# Patient Record
Sex: FEMALE | Race: WHITE | NOT HISPANIC OR LATINO | Employment: OTHER | URBAN - METROPOLITAN AREA
[De-identification: names, ages, dates, MRNs, and addresses within clinical notes are randomized per-mention and may not be internally consistent; named-entity substitution may affect disease eponyms.]

---

## 2017-01-03 DIAGNOSIS — J30.9 ALLERGIC RHINITIS: ICD-10-CM

## 2017-01-04 RX ORDER — MONTELUKAST SODIUM 10 MG/1
TABLET ORAL
Qty: 90 TABLET | Refills: 1 | Status: SHIPPED | OUTPATIENT
Start: 2017-01-04 | End: 2017-07-05 | Stop reason: SDUPTHER

## 2017-02-13 DIAGNOSIS — E78.5 HYPERLIPIDEMIA: Chronic | ICD-10-CM

## 2017-02-13 RX ORDER — PRAVASTATIN SODIUM 80 MG/1
TABLET ORAL
Qty: 90 TABLET | Refills: 0 | Status: SHIPPED | OUTPATIENT
Start: 2017-02-13 | End: 2017-05-16 | Stop reason: SDUPTHER

## 2017-03-09 DIAGNOSIS — C50.911 MALIGNANT NEOPLASM OF RIGHT FEMALE BREAST, UNSPECIFIED SITE OF BREAST: ICD-10-CM

## 2017-03-09 DIAGNOSIS — R32 URINARY INCONTINENCE, UNSPECIFIED TYPE: ICD-10-CM

## 2017-03-09 DIAGNOSIS — I10 ESSENTIAL HYPERTENSION: Chronic | ICD-10-CM

## 2017-03-09 DIAGNOSIS — R32 URINARY INCONTINENCE, UNSPECIFIED TYPE: Primary | ICD-10-CM

## 2017-03-09 RX ORDER — ANASTROZOLE 1 MG/1
TABLET ORAL
Qty: 90 TABLET | Refills: 0 | Status: SHIPPED | OUTPATIENT
Start: 2017-03-09 | End: 2017-03-15 | Stop reason: SDUPTHER

## 2017-03-09 RX ORDER — OXYBUTYNIN CHLORIDE 10 MG/1
10 TABLET, EXTENDED RELEASE ORAL DAILY
Qty: 30 TABLET | Refills: 0 | Status: SHIPPED | OUTPATIENT
Start: 2017-03-09 | End: 2017-03-09 | Stop reason: SDUPTHER

## 2017-03-09 RX ORDER — OXYBUTYNIN CHLORIDE 10 MG/1
TABLET, EXTENDED RELEASE ORAL
Qty: 90 TABLET | Refills: 0 | Status: SHIPPED | OUTPATIENT
Start: 2017-03-09 | End: 2017-03-15 | Stop reason: SDUPTHER

## 2017-03-09 RX ORDER — AMLODIPINE BESYLATE 10 MG/1
10 TABLET ORAL DAILY
Qty: 30 TABLET | Refills: 0 | Status: SHIPPED | OUTPATIENT
Start: 2017-03-09 | End: 2017-03-09 | Stop reason: SDUPTHER

## 2017-03-09 RX ORDER — ANASTROZOLE 1 MG/1
TABLET ORAL
Qty: 30 TABLET | Refills: 0 | Status: SHIPPED | OUTPATIENT
Start: 2017-03-09 | End: 2017-03-09 | Stop reason: SDUPTHER

## 2017-03-09 RX ORDER — AMLODIPINE BESYLATE 10 MG/1
TABLET ORAL
Qty: 90 TABLET | Refills: 0 | Status: SHIPPED | OUTPATIENT
Start: 2017-03-09 | End: 2017-03-15 | Stop reason: SDUPTHER

## 2017-03-09 NOTE — TELEPHONE ENCOUNTER
----- Message from Rose Marie Saunders sent at 3/9/2017 10:43 AM CST -----  Contact: self  Pt needs to speak with a nurse in regards to medication refill before visit. Please call [phone]. Thanks

## 2017-03-15 ENCOUNTER — LAB VISIT (OUTPATIENT)
Dept: LAB | Facility: HOSPITAL | Age: 77
End: 2017-03-15
Attending: FAMILY MEDICINE
Payer: MEDICARE

## 2017-03-15 ENCOUNTER — OFFICE VISIT (OUTPATIENT)
Dept: FAMILY MEDICINE | Facility: CLINIC | Age: 77
End: 2017-03-15
Payer: MEDICARE

## 2017-03-15 VITALS
DIASTOLIC BLOOD PRESSURE: 66 MMHG | WEIGHT: 176.38 LBS | BODY MASS INDEX: 30.11 KG/M2 | HEIGHT: 64 IN | OXYGEN SATURATION: 96 % | HEART RATE: 97 BPM | SYSTOLIC BLOOD PRESSURE: 122 MMHG | TEMPERATURE: 98 F

## 2017-03-15 DIAGNOSIS — R32 URINARY INCONTINENCE, UNSPECIFIED TYPE: ICD-10-CM

## 2017-03-15 DIAGNOSIS — M25.50 ARTHRALGIA OF MULTIPLE JOINTS: ICD-10-CM

## 2017-03-15 DIAGNOSIS — I10 ESSENTIAL HYPERTENSION: Chronic | ICD-10-CM

## 2017-03-15 DIAGNOSIS — C50.511 MALIGNANT NEOPLASM OF LOWER-OUTER QUADRANT OF RIGHT FEMALE BREAST: Chronic | ICD-10-CM

## 2017-03-15 DIAGNOSIS — J44.9 CHRONIC OBSTRUCTIVE PULMONARY DISEASE, UNSPECIFIED COPD TYPE: ICD-10-CM

## 2017-03-15 DIAGNOSIS — Z00.00 ANNUAL PHYSICAL EXAM: Primary | ICD-10-CM

## 2017-03-15 DIAGNOSIS — C50.911 MALIGNANT NEOPLASM OF RIGHT FEMALE BREAST, UNSPECIFIED SITE OF BREAST: ICD-10-CM

## 2017-03-15 DIAGNOSIS — E03.9 HYPOTHYROIDISM, UNSPECIFIED TYPE: Chronic | ICD-10-CM

## 2017-03-15 DIAGNOSIS — N18.30 CKD (CHRONIC KIDNEY DISEASE) STAGE 3, GFR 30-59 ML/MIN: Chronic | ICD-10-CM

## 2017-03-15 DIAGNOSIS — N25.81 SECONDARY HYPERPARATHYROIDISM: Chronic | ICD-10-CM

## 2017-03-15 DIAGNOSIS — Z23 NEED FOR PROPHYLACTIC VACCINATION WITH STREPTOCOCCUS PNEUMONIAE (PNEUMOCOCCUS) AND INFLUENZA VACCINES: ICD-10-CM

## 2017-03-15 LAB
ALBUMIN SERPL BCP-MCNC: 4.1 G/DL
ALP SERPL-CCNC: 90 U/L
ALT SERPL W/O P-5'-P-CCNC: 10 U/L
ANION GAP SERPL CALC-SCNC: 16 MMOL/L
AST SERPL-CCNC: 19 U/L
BASOPHILS # BLD AUTO: 0.05 K/UL
BASOPHILS NFR BLD: 0.5 %
BILIRUB SERPL-MCNC: 0.4 MG/DL
BUN SERPL-MCNC: 25 MG/DL
CALCIUM SERPL-MCNC: 10.7 MG/DL
CHLORIDE SERPL-SCNC: 103 MMOL/L
CHOLEST/HDLC SERPL: 4.5 {RATIO}
CO2 SERPL-SCNC: 21 MMOL/L
CREAT SERPL-MCNC: 1.2 MG/DL
DIFFERENTIAL METHOD: ABNORMAL
EOSINOPHIL # BLD AUTO: 0.5 K/UL
EOSINOPHIL NFR BLD: 4.5 %
ERYTHROCYTE [DISTWIDTH] IN BLOOD BY AUTOMATED COUNT: 13.2 %
EST. GFR  (AFRICAN AMERICAN): 51.1 ML/MIN/1.73 M^2
EST. GFR  (NON AFRICAN AMERICAN): 44.3 ML/MIN/1.73 M^2
GLUCOSE SERPL-MCNC: 119 MG/DL
HCT VFR BLD AUTO: 40.9 %
HDL/CHOLESTEROL RATIO: 22.2 %
HDLC SERPL-MCNC: 203 MG/DL
HDLC SERPL-MCNC: 45 MG/DL
HGB BLD-MCNC: 13.3 G/DL
LDLC SERPL CALC-MCNC: 123.8 MG/DL
LYMPHOCYTES # BLD AUTO: 2.5 K/UL
LYMPHOCYTES NFR BLD: 22.8 %
MCH RBC QN AUTO: 30.6 PG
MCHC RBC AUTO-ENTMCNC: 32.5 %
MCV RBC AUTO: 94 FL
MONOCYTES # BLD AUTO: 0.8 K/UL
MONOCYTES NFR BLD: 7.5 %
NEUTROPHILS # BLD AUTO: 7.1 K/UL
NEUTROPHILS NFR BLD: 64.3 %
NONHDLC SERPL-MCNC: 158 MG/DL
PLATELET # BLD AUTO: 472 K/UL
PMV BLD AUTO: 9.8 FL
POTASSIUM SERPL-SCNC: 4.4 MMOL/L
PROT SERPL-MCNC: 7.8 G/DL
RBC # BLD AUTO: 4.34 M/UL
SODIUM SERPL-SCNC: 140 MMOL/L
TRIGL SERPL-MCNC: 171 MG/DL
TSH SERPL DL<=0.005 MIU/L-ACNC: 2.73 UIU/ML
WBC # BLD AUTO: 11 K/UL

## 2017-03-15 PROCEDURE — 85025 COMPLETE CBC W/AUTO DIFF WBC: CPT

## 2017-03-15 PROCEDURE — 84443 ASSAY THYROID STIM HORMONE: CPT

## 2017-03-15 PROCEDURE — 90662 IIV NO PRSV INCREASED AG IM: CPT | Mod: S$GLB,,, | Performed by: FAMILY MEDICINE

## 2017-03-15 PROCEDURE — 99999 PR PBB SHADOW E&M-EST. PATIENT-LVL III: CPT | Mod: PBBFAC,,, | Performed by: FAMILY MEDICINE

## 2017-03-15 PROCEDURE — 3078F DIAST BP <80 MM HG: CPT | Mod: S$GLB,,, | Performed by: FAMILY MEDICINE

## 2017-03-15 PROCEDURE — G0009 ADMIN PNEUMOCOCCAL VACCINE: HCPCS | Mod: S$GLB,,, | Performed by: FAMILY MEDICINE

## 2017-03-15 PROCEDURE — G0008 ADMIN INFLUENZA VIRUS VAC: HCPCS | Mod: S$GLB,,, | Performed by: FAMILY MEDICINE

## 2017-03-15 PROCEDURE — 36415 COLL VENOUS BLD VENIPUNCTURE: CPT | Mod: PO

## 2017-03-15 PROCEDURE — 3074F SYST BP LT 130 MM HG: CPT | Mod: S$GLB,,, | Performed by: FAMILY MEDICINE

## 2017-03-15 PROCEDURE — 90732 PPSV23 VACC 2 YRS+ SUBQ/IM: CPT | Mod: S$GLB,,, | Performed by: FAMILY MEDICINE

## 2017-03-15 PROCEDURE — 80061 LIPID PANEL: CPT

## 2017-03-15 PROCEDURE — 80053 COMPREHEN METABOLIC PANEL: CPT

## 2017-03-15 PROCEDURE — 99397 PER PM REEVAL EST PAT 65+ YR: CPT | Mod: 25,S$GLB,, | Performed by: FAMILY MEDICINE

## 2017-03-15 RX ORDER — AMLODIPINE BESYLATE 10 MG/1
TABLET ORAL
Qty: 90 TABLET | Refills: 1 | Status: SHIPPED | OUTPATIENT
Start: 2017-03-15 | End: 2017-10-04 | Stop reason: SDUPTHER

## 2017-03-15 RX ORDER — ANASTROZOLE 1 MG/1
TABLET ORAL
Qty: 90 TABLET | Refills: 1 | Status: SHIPPED | OUTPATIENT
Start: 2017-03-15 | End: 2017-10-04 | Stop reason: SDUPTHER

## 2017-03-15 RX ORDER — OXYBUTYNIN CHLORIDE 10 MG/1
TABLET, EXTENDED RELEASE ORAL
Qty: 90 TABLET | Refills: 1 | Status: SHIPPED | OUTPATIENT
Start: 2017-03-15 | End: 2017-10-04 | Stop reason: SDUPTHER

## 2017-03-15 NOTE — PATIENT INSTRUCTIONS
Eating Heart-Healthy Foods  Eating has a big impact on your heart health. In fact, eating healthier can improve several of your heart risks at once. For instance, it helps you manage weight, cholesterol, and blood pressure. Here are ideas to help you make heart-healthy changes without giving up all the foods and flavors you love.  Getting started  · Talk with your health care provider about eating plans, such as the DASH or Mediterranean diet. You may also be referred to a dietitian.  · Change a few things at a time. Give yourself time to get used to a few eating changes before adding more.  · Work to create a tasty, healthy eating plan that you can stick to for the rest of your life.    Goals for healthy eating  Below are some tips to improve your eating habits:  · Limit saturated fats and trans fats. Saturated fats raise your levels of cholesterol, so keep these fats to a minimum. They are found in foods such as fatty meats, whole milk, cheese, and palm and coconut oils. Avoid trans fats because they lower good cholesterol as well as raise bad cholesterol. Trans fats are most often found in processed foods.  · Reduce sodium (salt) intake. Eating too much salt may increase your blood pressure. Limit your sodium intake to 2,300 milligrams (mg) per day, or less if your health care provider recommends it. Dining out less often and eating fewer processed foods are two great ways to decrease the amount of salt you consume.  · Managing calories. A calorie is a unit of energy. Your body burns calories for fuel, but if you eat more calories than your body burns, the extras are stored as fat. Your health care provider can help you create a diet plan to manage your calories. This will likely include eating healthier foods as well as exercising regularly. To help you track your progress, keep a diary to record what you eat and how often you exercise.  Choose the right foods  Aim to make these foods staples of your diet. If  you have diabetes, you may have different recommendations than what is listed here:  · Fruits and vegetable provide plenty of nutrients without a lot of calories. At meals, fill half your plate with these foods. Split the other half of your plate between whole grains and lean protein.  · Whole grains are high in fiber and rich in vitamins and nutrients. Good choices include whole-wheat bread, pasta, and brown rice.  · Lean proteins give you nutrition with less fat. Good choices include fish, skinless chicken, and beans.  · Low-fat or nonfat dairy provides nutrients without a lot of fat. Try low-fat or nonfat milk, cheese, or yogurt.  · Healthy fats can be good for you in small amounts. These are unsaturated fats, such as olive oil, nuts, and fish. Try to have at least 2 servings per week of fatty fish such as salmon, sardines, mackerel, rainbow trout, and albacore tuna. These contain omega-3 fatty acids, which are good for your heart. Flaxseed is another source of a heart-healthy fat.  More on heart healthy eating    Read food labels  Healthy eating starts at the grocery store. Be sure to pay attention to food labels on packaged foods. Look for products that are high in fiber and protein, and low in saturated fat, cholesterol, and sodium. Avoid products that contain trans fat. And pay close attention to serving size. For instance, if you plan to eat two servings, double all the numbers on the label.  Prepare food right  A key part of healthy cooking is cutting down on added fat and salt. Look on the internet for lower-fat, lower-sodium recipes. Also, try these tips:  · Remove fat from meat and skin from poultry before cooking.  · Skim fat from the surface of soups and sauces.  · Broil, boil, bake, steam, grill, and microwave food without added fats.  · Choose ingredients that spice up your food without adding calories, fat, or sodium. Try these items: horseradish, hot sauce, lemon, mustard, nonfat salad dressings,  and vinegar. For salt-free herbs and spices, try basil, cilantro, cinnamon, pepper, and rosemary.  Date Last Reviewed: 6/25/2015  © 4278-0240 [employer]. [address] [place] [address], [place] [address]. All rights reserved. This information is not intended as a substitute for professional medical care. Always follow your healthcare professional's instructions.

## 2017-03-15 NOTE — MR AVS SNAPSHOT
[facility]  [address]  [place] [address]  Phone: [phone]  Fax: [phone]                  Ivelisse Kern   3/15/2017 2:00 PM   Office Visit    Description:  Female : 1940   Provider:  Mile Young MD   Department:  [facility] Medicine           Reason for Visit     Establish Care     Foot Pain           Diagnoses this Visit        Comments    Malignant neoplasm of lower-outer quadrant of right female breast    -  Primary     Urinary incontinence, unspecified type         Essential hypertension         Malignant neoplasm of right female breast, unspecified site of breast         Need for prophylactic vaccination with Streptococcus pneumoniae (Pneumococcus) and Influenza vaccines                To Do List           Future Appointments        Provider Department Dept Phone    3/15/2017 3:00 PM LAB, [place] [facility] [phone]      Goals (5 Years of Data)              Today    16    Blood Pressure < 140/90   122/66  122/66  122/66       These Medications        Disp Refills Start End    oxybutynin (DITROPAN-XL) 10 MG 24 hr tablet 90 tablet 1 3/15/2017     TAKE 1 TABLET(10 MG) BY MOUTH EVERY DAY    Pharmacy: [facility] [address] AT [facility] Ph #: [phone]       Notes to Pharmacy: **Patient requests 90 days supply**    amlodipine (NORVASC) 10 MG tablet 90 tablet 1 3/15/2017     TAKE 1 TABLET(10 MG) BY MOUTH EVERY DAY    Pharmacy: [facility] [address] AT [facility] Ph #: [phone]       Notes to Pharmacy: **Patient requests 90 days supply**    anastrozole (ARIMIDEX) 1 mg Tab 90 tablet 1 3/15/2017     TAKE 1 TABLET(1 MG) BY MOUTH EVERY DAY    Pharmacy: [facility] [address] AT [facility] Ph #: [phone]       Notes to Pharmacy: **Patient requests  90 days supply**      [facility] On Call     [facility] On Call Nurse Care Line - 24/7 Assistance  Registered nurses in the [facility] provide clinical advisement, health education, appointment booking, and other advisory services.  Call for this free service at [phone].             Medications           Message regarding Medications     Verify the changes and/or additions to your medication regime listed below are the same as discussed with your clinician today.  If any of these changes or additions are incorrect, please notify your healthcare provider.        STOP taking these medications     denosumab (PROLIA) 60 mg/mL Syrg Inject 1 mL (60 mg total) into the skin every 6 (six) months.           Verify that the below list of medications is an accurate representation of the medications you are currently taking.  If none reported, the list may be blank. If incorrect, please contact your healthcare provider. Carry this list with you in case of emergency.           Current Medications     albuterol (PROAIR HFA) 90 mcg/actuation inhaler Inhale 1 puff into the lungs every 6 (six) hours as needed for Wheezing.    allopurinol (ZYLOPRIM) 100 MG tablet TAKE 1 TABLET(100 MG) BY MOUTH EVERY DAY    amlodipine (NORVASC) 10 MG tablet TAKE 1 TABLET(10 MG) BY MOUTH EVERY DAY    anastrozole (ARIMIDEX) 1 mg Tab TAKE 1 TABLET(1 MG) BY MOUTH EVERY DAY    calcium carbonate (OS-AMAURY) 500 mg calcium (1,250 mg) tablet Take 2 tablets (1,000 mg total) by mouth once daily.    cholecalciferol, vitamin D3, 400 unit Cap Take 2 capsules (800 Units total) by mouth once daily.    diclofenac sodium (VOLTAREN) 1 % Gel Apply 2 g topically daily as needed.    fluticasone (FLONASE) 50 mcg/actuation nasal spray 1 spray by Each Nare route once daily.    levothyroxine (SYNTHROID) 25 MCG tablet TAKE 1 TABLET(25 MCG) BY MOUTH EVERY MORNING    metoprolol succinate (TOPROL-XL) 50 MG 24 hr tablet TAKE 1 TABLET(50 MG) BY MOUTH EVERY EVENING     "montelukast (SINGULAIR) 10 mg tablet TAKE 1 TABLET BY MOUTH EVERY EVENING    omeprazole (PRILOSEC) 20 MG capsule Take 1 capsule (20 mg total) by mouth once daily.    oxybutynin (DITROPAN-XL) 10 MG 24 hr tablet TAKE 1 TABLET(10 MG) BY MOUTH EVERY DAY    pravastatin (PRAVACHOL) 80 MG tablet TAKE 1 TABLET BY MOUTH EVERY EVENING    tramadol (ULTRAM) 50 mg tablet TAKE 1 TABLET BY MOUTH EVERY 8 HOURS AS NEEDED           Clinical Reference Information           Your Vitals Were     BP Pulse Temp Height Weight SpO2    122/66 (BP Location: Left arm, Patient Position: Sitting, BP Method: Manual) 97 98 °F (36.7 °C) (Oral) 5' 4" (1.626 m) 80 kg (176 lb 5.9 oz) 96%    BMI                30.27 kg/m2          Blood Pressure          Most Recent Value    BP  122/66      Allergies as of 3/15/2017     Codeine      Immunizations Administered on Date of Encounter - 3/15/2017     Name Date Dose VIS Date Route    Influenza - High Dose  Incomplete 0.5 mL 8/7/2015 Intramuscular    Pneumococcal Polysaccharide - 23 Valent  Incomplete 0.5 mL 4/24/2015 Intramuscular      Orders Placed During Today's Visit      Normal Orders This Visit    Influenza - High Dose (65+) (PF) (IM)     Pneumococcal Polysaccharide Vaccine (23 Valent) (SQ/IM)     Future Labs/Procedures Expected by Expires    CBC auto differential  3/15/2017 3/15/2018    Comprehensive metabolic panel  3/15/2017 3/15/2018    Lipid panel  3/15/2017 3/15/2018    Mammo Digital Diagnostic Bilat with Tomosynthesis_CAD  3/15/2017 5/15/2018    TSH  3/15/2017 3/15/2018      [facility] Sign-Up     Activating your [facility] account is as easy as 1-2-3!     1) Visit [email or web address], select Sign Up Now, enter this activation code and your date of birth, then select Next.  Activation code not generated  Current Patient Portal Status: Account disabled      2) Create a username and password to use when you visit [facility] in the future and select a security question in case you lose your password and " select Next.    3) Enter your e-mail address and click Sign Up!    Additional Information  If you have questions, please e-mail [email or web address] or call [phone] to talk to our [facility] staff. Remember, [facility] is NOT to be used for urgent needs. For medical emergencies, dial 911.         Instructions      Eating Heart-Healthy Foods  Eating has a big impact on your heart health. In fact, eating healthier can improve several of your heart risks at once. For instance, it helps you manage weight, cholesterol, and blood pressure. Here are ideas to help you make heart-healthy changes without giving up all the foods and flavors you love.  Getting started  · Talk with your health care provider about eating plans, such as the DASH or Mediterranean diet. You may also be referred to a dietitian.  · Change a few things at a time. Give yourself time to get used to a few eating changes before adding more.  · Work to create a tasty, healthy eating plan that you can stick to for the rest of your life.    Goals for healthy eating  Below are some tips to improve your eating habits:  · Limit saturated fats and trans fats. Saturated fats raise your levels of cholesterol, so keep these fats to a minimum. They are found in foods such as fatty meats, whole milk, cheese, and palm and coconut oils. Avoid trans fats because they lower good cholesterol as well as raise bad cholesterol. Trans fats are most often found in processed foods.  · Reduce sodium (salt) intake. Eating too much salt may increase your blood pressure. Limit your sodium intake to 2,300 milligrams (mg) per day, or less if your health care provider recommends it. Dining out less often and eating fewer processed foods are two great ways to decrease the amount of salt you consume.  · Managing calories. A calorie is a unit of energy. Your body burns calories for fuel, but if you eat more calories than your body burns, the extras are stored as fat. Your health care  provider can help you create a diet plan to manage your calories. This will likely include eating healthier foods as well as exercising regularly. To help you track your progress, keep a diary to record what you eat and how often you exercise.  Choose the right foods  Aim to make these foods staples of your diet. If you have diabetes, you may have different recommendations than what is listed here:  · Fruits and vegetable provide plenty of nutrients without a lot of calories. At meals, fill half your plate with these foods. Split the other half of your plate between whole grains and lean protein.  · Whole grains are high in fiber and rich in vitamins and nutrients. Good choices include whole-wheat bread, pasta, and brown rice.  · Lean proteins give you nutrition with less fat. Good choices include fish, skinless chicken, and beans.  · Low-fat or nonfat dairy provides nutrients without a lot of fat. Try low-fat or nonfat milk, cheese, or yogurt.  · Healthy fats can be good for you in small amounts. These are unsaturated fats, such as olive oil, nuts, and fish. Try to have at least 2 servings per week of fatty fish such as salmon, sardines, mackerel, rainbow trout, and albacore tuna. These contain omega-3 fatty acids, which are good for your heart. Flaxseed is another source of a heart-healthy fat.  More on heart healthy eating    Read food labels  Healthy eating starts at the grocery store. Be sure to pay attention to food labels on packaged foods. Look for products that are high in fiber and protein, and low in saturated fat, cholesterol, and sodium. Avoid products that contain trans fat. And pay close attention to serving size. For instance, if you plan to eat two servings, double all the numbers on the label.  Prepare food right  A key part of healthy cooking is cutting down on added fat and salt. Look on the internet for lower-fat, lower-sodium recipes. Also, try these tips:  · Remove fat from meat and skin from  poultry before cooking.  · Skim fat from the surface of soups and sauces.  · Broil, boil, bake, steam, grill, and microwave food without added fats.  · Choose ingredients that spice up your food without adding calories, fat, or sodium. Try these items: horseradish, hot sauce, lemon, mustard, nonfat salad dressings, and vinegar. For salt-free herbs and spices, try basil, cilantro, cinnamon, pepper, and rosemary.  Date Last Reviewed: 6/25/2015  © 3844-0941 [employer]. [address] [place] [address]. All rights reserved. This information is not intended as a substitute for professional medical care. Always follow your healthcare professional's instructions.             Language Assistance Services     ATTENTION: Language assistance services are available, free of charge. Please call [phone].      ATENCIÓN: Si habla bruce, tiene a schwartz disposición servicios gratuitos de asistencia lingüística. Llame al [phone].     CHÚ Ý: N?u b?n nói Ti?ng Vi?t, có các d?ch v? h? tr? ngôn ng? mi?n phí dành cho b?n. G?i s? [phone].         [facility] complies with applicable Federal civil rights laws and does not discriminate on the basis of race, color, national origin, age, disability, or sex.

## 2017-03-15 NOTE — PROGRESS NOTES
Routine Office Visit    Patient Name: Ivelisse Kern    : 1940  MRN: [id number]    Subjective:  Ivelisse is a 76 y.o. female who presents today for     1. Establish care / new to me   2. Body aches, bilateral shoulder pain - chronic condition for patient. Pt sees bone and joint and was advised she needed shoulder surgery / replacement. Pt c/o body aches and wants to know if this is related to age.   3. Right foot pain - pain is at patient's heel. Pain is intermittent, worse when patient is laying down sleeping. It does not occur during the day. No falls / injuries. No aggravating / associated symptoms     Review of Systems   Constitutional: Negative for chills and fever.   HENT: Negative for congestion.    Eyes: Negative for blurred vision.   Respiratory: Negative for cough.    Cardiovascular: Negative for chest pain.   Gastrointestinal: Negative for abdominal pain, constipation, diarrhea, heartburn, nausea and vomiting.   Genitourinary: Negative for dysuria.   Musculoskeletal: Positive for back pain, joint pain and myalgias.   Skin: Negative for itching and rash.   Neurological: Negative for dizziness and headaches.   Psychiatric/Behavioral: Negative for depression.       Active Problem List  Patient Active Problem List   Diagnosis    COPD (chronic obstructive pulmonary disease)    Essential hypertension    Hyperlipidemia    Urge and stress incontinence    Leg pain    GERD (gastroesophageal reflux disease)    Hypothyroidism    Anxiety    AR (allergic rhinitis)    CKD (chronic kidney disease) stage 3, GFR 30-59 ml/min    Vitamin D deficiency disease    Osteopenia    Breast cancer    Visit for monitoring Arimidex therapy    Hot flashes related to aromatase inhibitor therapy    Radiation dermatitis    Myalgia    Hyperuricemia    Bilateral cataracts    Secondary hyperparathyroidism       Past Surgical History  Past Surgical History:   Procedure Laterality Date    APPENDECTOMY      BREAST BIOPSY  "Right 4/15/2014    OVARIAN CYST REMOVAL      TONSILLECTOMY         Family History  Family History   Problem Relation Age of Onset    Cancer Brother      lung       Social History  Social History     Social History    Marital status: Single     Spouse name: N/A    Number of children: N/A    Years of education: N/A     Occupational History    Not on file.     Social History Main Topics    Smoking status: Never Smoker    Smokeless tobacco: Not on file    Alcohol use No    Drug use: No    Sexual activity: Not Currently     Other Topics Concern    Not on file     Social History Narrative       Medications and Allergies  Reviewed and updated.       Physical Exam  /66 (BP Location: Left arm, Patient Position: Sitting, BP Method: Manual)  Pulse 97  Temp 98 °F (36.7 °C) (Oral)   Ht 5' 4" (1.626 m)  Wt 80 kg (176 lb 5.9 oz)  SpO2 96%  BMI 30.27 kg/m2  Physical Exam   Constitutional: She is oriented to person, place, and time. She appears well-developed and well-nourished.   HENT:   Head: Normocephalic and atraumatic.   Eyes: Conjunctivae and EOM are normal. Pupils are equal, round, and reactive to light.   Neck: Normal range of motion. Neck supple.   Cardiovascular: Normal rate, regular rhythm and normal heart sounds.  Exam reveals no gallop and no friction rub.    No murmur heard.  Pulmonary/Chest: Breath sounds normal. No respiratory distress.   Abdominal: Soft. Bowel sounds are normal. She exhibits no distension. There is no tenderness.   Musculoskeletal: Normal range of motion.   Lymphadenopathy:     She has no cervical adenopathy.   Neurological: She is alert and oriented to person, place, and time.   Skin: Skin is warm.   Psychiatric: She has a normal mood and affect.         Assessment/Plan:  Ivelisse Kern is a 76 y.o. female who presents today for :    Annual physical exam    Malignant neoplasm of lower-outer quadrant of right female breast  -     Mammo Digital Diagnostic Bilat with " Tomosynthesis_CAD; Future; Expected date: 3/15/17    Urinary incontinence, unspecified type  -     oxybutynin (DITROPAN-XL) 10 MG 24 hr tablet; TAKE 1 TABLET(10 MG) BY MOUTH EVERY DAY  Dispense: 90 tablet; Refill: 1    Essential hypertension  -     Cancel: Lipid panel; Future; Expected date: 3/15/17  -     CBC auto differential; Future; Expected date: 3/15/17  -     Comprehensive metabolic panel; Future; Expected date: 3/15/17  -     Lipid panel; Future; Expected date: 3/15/17  -     TSH; Future; Expected date: 3/15/17  -     amlodipine (NORVASC) 10 MG tablet; TAKE 1 TABLET(10 MG) BY MOUTH EVERY DAY  Dispense: 90 tablet; Refill: 1    Malignant neoplasm of right female breast, unspecified site of breast  -     anastrozole (ARIMIDEX) 1 mg Tab; TAKE 1 TABLET(1 MG) BY MOUTH EVERY DAY  Dispense: 90 tablet; Refill: 1    Need for prophylactic vaccination with Streptococcus pneumoniae (Pneumococcus) and Influenza vaccines  -     Influenza - High Dose (65+) (PF) (IM)  -     Pneumococcal Polysaccharide Vaccine (23 Valent) (SQ/IM)    Chronic obstructive pulmonary disease, unspecified COPD type  Noted in chart  The current medical regimen is effective;  continue present plan and medications.    CKD (chronic kidney disease) stage 3, GFR 30-59 ml/min / Secondary hyperparathyroidism  Noted in chart  Continue blood pressure monitoring and control     Hypothyroidism, unspecified type  The current medical regimen is effective;  continue present plan and medications.    Arthralgia of multiple joints  Pt with complaints of arthralgia  No PT at this time, will consider if symptoms worsen / progress         Return in about 6 months (around 9/15/2017), or if symptoms worsen or fail to improve.

## 2017-03-20 ENCOUNTER — TELEPHONE (OUTPATIENT)
Dept: FAMILY MEDICINE | Facility: CLINIC | Age: 77
End: 2017-03-20

## 2017-03-20 NOTE — TELEPHONE ENCOUNTER
----- Message from Mile Young MD sent at 3/20/2017 10:01 AM CDT -----  Lab work reviewed  Your platelets are high, but this number has been high for you for a few years. We will continue to monitor  Your triglycerides (a type of fat) were elevated. I recommend a low fat diet and regular exercise. I also recommend over the counter omega 3 fish oil daily (~1gm/day)  Your kidney function is decreased. This is also stable. We will continue to  Monitor  Please return in 6 months for recheck.

## 2017-03-20 NOTE — PROGRESS NOTES
Lab work reviewed  Your platelets are high, but this number has been high for you for a few years. We will continue to monitor  Your triglycerides (a type of fat) were elevated. I recommend a low fat diet and regular exercise. I also recommend over the counter omega 3 fish oil daily (~1gm/day)  Your kidney function is decreased. This is also stable. We will continue to  Monitor  Please return in 6 months for recheck.

## 2017-03-29 ENCOUNTER — HOSPITAL ENCOUNTER (OUTPATIENT)
Dept: RADIOLOGY | Facility: HOSPITAL | Age: 77
Discharge: HOME | End: 2017-03-29
Attending: FAMILY MEDICINE
Payer: MEDICARE

## 2017-03-29 DIAGNOSIS — C50.511 MALIGNANT NEOPLASM OF LOWER-OUTER QUADRANT OF RIGHT FEMALE BREAST: Chronic | ICD-10-CM

## 2017-03-29 PROCEDURE — 77062 BREAST TOMOSYNTHESIS BI: CPT | Mod: 26,,, | Performed by: RADIOLOGY

## 2017-03-29 PROCEDURE — 77062 BREAST TOMOSYNTHESIS BI: CPT | Mod: TC

## 2017-03-29 PROCEDURE — 77066 DX MAMMO INCL CAD BI: CPT | Mod: 26,,, | Performed by: RADIOLOGY

## 2017-04-10 DIAGNOSIS — R32 URINARY INCONTINENCE, UNSPECIFIED TYPE: ICD-10-CM

## 2017-04-10 RX ORDER — OXYBUTYNIN CHLORIDE 10 MG/1
TABLET, EXTENDED RELEASE ORAL
Qty: 30 TABLET | Refills: 0 | Status: SHIPPED | OUTPATIENT
Start: 2017-04-10 | End: 2017-10-04 | Stop reason: SDUPTHER

## 2017-04-26 DIAGNOSIS — E79.0 HYPERURICEMIA: ICD-10-CM

## 2017-04-26 DIAGNOSIS — I10 ESSENTIAL HYPERTENSION: Chronic | ICD-10-CM

## 2017-04-26 RX ORDER — METOPROLOL SUCCINATE 50 MG/1
TABLET, EXTENDED RELEASE ORAL
Qty: 90 TABLET | Refills: 1 | Status: SHIPPED | OUTPATIENT
Start: 2017-04-26 | End: 2017-10-04 | Stop reason: SDUPTHER

## 2017-04-26 RX ORDER — ALLOPURINOL 100 MG/1
TABLET ORAL
Qty: 90 TABLET | Refills: 1 | Status: SHIPPED | OUTPATIENT
Start: 2017-04-26 | End: 2017-10-04 | Stop reason: SDUPTHER

## 2017-04-26 NOTE — TELEPHONE ENCOUNTER
----- Message from Candice Krishna sent at 4/26/2017  9:12 AM CDT -----  Contact: self  Pt is requesting a refill for allopurinol (ZYLOPRIM) 100 MG tablet & metoprolol succinate (TOPROL-XL) 50 MG 24 hr tablet. [phone]

## 2017-05-11 DIAGNOSIS — E03.9 HYPOTHYROIDISM, UNSPECIFIED TYPE: ICD-10-CM

## 2017-05-11 RX ORDER — LEVOTHYROXINE SODIUM 25 UG/1
TABLET ORAL
Qty: 90 TABLET | Refills: 1 | Status: SHIPPED | OUTPATIENT
Start: 2017-05-11 | End: 2017-10-04 | Stop reason: SDUPTHER

## 2017-05-16 DIAGNOSIS — E78.5 HYPERLIPIDEMIA: Chronic | ICD-10-CM

## 2017-05-16 RX ORDER — PRAVASTATIN SODIUM 80 MG/1
TABLET ORAL
Qty: 90 TABLET | Refills: 0 | Status: SHIPPED | OUTPATIENT
Start: 2017-05-16 | End: 2017-08-09 | Stop reason: SDUPTHER

## 2017-06-23 DIAGNOSIS — J44.9 CHRONIC OBSTRUCTIVE PULMONARY DISEASE, UNSPECIFIED COPD TYPE: ICD-10-CM

## 2017-06-23 RX ORDER — ALBUTEROL SULFATE 90 UG/1
1 AEROSOL, METERED RESPIRATORY (INHALATION) EVERY 6 HOURS PRN
Qty: 25.5 G | Refills: 1 | Status: SHIPPED | OUTPATIENT
Start: 2017-06-23 | End: 2019-03-25 | Stop reason: SDUPTHER

## 2017-06-23 NOTE — TELEPHONE ENCOUNTER
----- Message from Jessika Wood sent at 6/23/2017  8:10 AM CDT -----  Refill: albuterol (PROAIR HFA) 90 mcg/actuation inhaler    Please send to [facility]. Thank you!

## 2017-07-05 RX ORDER — MONTELUKAST SODIUM 10 MG/1
10 TABLET ORAL NIGHTLY
Qty: 90 TABLET | Refills: 1 | Status: SHIPPED | OUTPATIENT
Start: 2017-07-05 | End: 2017-10-04 | Stop reason: SDUPTHER

## 2017-07-05 NOTE — TELEPHONE ENCOUNTER
----- Message from Candice Krishna sent at 7/5/2017  8:15 AM CDT -----  Contact: self  Pt is requesting a refill for montelukast (SINGULAIR) 10 mg tablet. Please advise. [phone]

## 2017-08-09 DIAGNOSIS — E78.5 HYPERLIPIDEMIA: Chronic | ICD-10-CM

## 2017-08-09 RX ORDER — PRAVASTATIN SODIUM 80 MG/1
TABLET ORAL
Qty: 90 TABLET | Refills: 0 | Status: SHIPPED | OUTPATIENT
Start: 2017-08-09 | End: 2017-10-04 | Stop reason: SDUPTHER

## 2017-08-24 DIAGNOSIS — J30.9 ALLERGIC RHINITIS: ICD-10-CM

## 2017-08-24 RX ORDER — FLUTICASONE PROPIONATE 50 MCG
SPRAY, SUSPENSION (ML) NASAL
Qty: 48 G | Refills: 1 | Status: SHIPPED | OUTPATIENT
Start: 2017-08-24 | End: 2018-04-04 | Stop reason: SDUPTHER

## 2017-08-24 RX ORDER — DICLOFENAC SODIUM 10 MG/G
GEL TOPICAL
Qty: 100 G | Refills: 0 | Status: SHIPPED | OUTPATIENT
Start: 2017-08-24 | End: 2017-10-04 | Stop reason: SDUPTHER

## 2017-10-04 ENCOUNTER — OFFICE VISIT (OUTPATIENT)
Dept: FAMILY MEDICINE | Facility: CLINIC | Age: 77
End: 2017-10-04
Payer: MEDICARE

## 2017-10-04 ENCOUNTER — LAB VISIT (OUTPATIENT)
Dept: LAB | Facility: HOSPITAL | Age: 77
End: 2017-10-04
Attending: FAMILY MEDICINE
Payer: MEDICARE

## 2017-10-04 VITALS
SYSTOLIC BLOOD PRESSURE: 110 MMHG | WEIGHT: 171.5 LBS | HEIGHT: 64 IN | HEART RATE: 84 BPM | DIASTOLIC BLOOD PRESSURE: 60 MMHG | TEMPERATURE: 98 F | OXYGEN SATURATION: 96 % | BODY MASS INDEX: 29.28 KG/M2

## 2017-10-04 DIAGNOSIS — R32 URINARY INCONTINENCE, UNSPECIFIED TYPE: ICD-10-CM

## 2017-10-04 DIAGNOSIS — J30.9 ALLERGIC RHINITIS, UNSPECIFIED CHRONICITY, UNSPECIFIED SEASONALITY, UNSPECIFIED TRIGGER: ICD-10-CM

## 2017-10-04 DIAGNOSIS — Z00.00 ANNUAL PHYSICAL EXAM: Primary | ICD-10-CM

## 2017-10-04 DIAGNOSIS — E78.5 HYPERLIPIDEMIA, UNSPECIFIED HYPERLIPIDEMIA TYPE: ICD-10-CM

## 2017-10-04 DIAGNOSIS — N25.81 SECONDARY HYPERPARATHYROIDISM: Chronic | ICD-10-CM

## 2017-10-04 DIAGNOSIS — E03.9 HYPOTHYROIDISM, UNSPECIFIED TYPE: ICD-10-CM

## 2017-10-04 DIAGNOSIS — K21.9 GASTROESOPHAGEAL REFLUX DISEASE, ESOPHAGITIS PRESENCE NOT SPECIFIED: ICD-10-CM

## 2017-10-04 DIAGNOSIS — M89.9 DISEASE OF BONE: ICD-10-CM

## 2017-10-04 DIAGNOSIS — Z23 NEED FOR PROPHYLACTIC VACCINATION AND INOCULATION AGAINST INFLUENZA: ICD-10-CM

## 2017-10-04 DIAGNOSIS — E79.0 HYPERURICEMIA: ICD-10-CM

## 2017-10-04 DIAGNOSIS — I10 ESSENTIAL HYPERTENSION: Chronic | ICD-10-CM

## 2017-10-04 DIAGNOSIS — C50.511 MALIGNANT NEOPLASM OF LOWER-OUTER QUADRANT OF RIGHT FEMALE BREAST, UNSPECIFIED ESTROGEN RECEPTOR STATUS: Chronic | ICD-10-CM

## 2017-10-04 LAB
ALBUMIN SERPL BCP-MCNC: 3.8 G/DL
ALP SERPL-CCNC: 122 U/L
ALT SERPL W/O P-5'-P-CCNC: 7 U/L
ANION GAP SERPL CALC-SCNC: 10 MMOL/L
AST SERPL-CCNC: 14 U/L
BILIRUB SERPL-MCNC: 0.5 MG/DL
BUN SERPL-MCNC: 23 MG/DL
CALCIUM SERPL-MCNC: 9.8 MG/DL
CHLORIDE SERPL-SCNC: 104 MMOL/L
CHOLEST SERPL-MCNC: 200 MG/DL
CHOLEST/HDLC SERPL: 5.1 {RATIO}
CO2 SERPL-SCNC: 26 MMOL/L
CREAT SERPL-MCNC: 1 MG/DL
EST. GFR  (AFRICAN AMERICAN): >60 ML/MIN/1.73 M^2
EST. GFR  (NON AFRICAN AMERICAN): 54.9 ML/MIN/1.73 M^2
GLUCOSE SERPL-MCNC: 106 MG/DL
HDLC SERPL-MCNC: 39 MG/DL
HDLC SERPL: 19.5 %
LDLC SERPL CALC-MCNC: 121.4 MG/DL
NONHDLC SERPL-MCNC: 161 MG/DL
POTASSIUM SERPL-SCNC: 4.6 MMOL/L
PROT SERPL-MCNC: 7.7 G/DL
PTH-INTACT SERPL-MCNC: 78 PG/ML
SODIUM SERPL-SCNC: 140 MMOL/L
TRIGL SERPL-MCNC: 198 MG/DL

## 2017-10-04 PROCEDURE — 83970 ASSAY OF PARATHORMONE: CPT

## 2017-10-04 PROCEDURE — 99999 PR PBB SHADOW E&M-EST. PATIENT-LVL IV: CPT | Mod: PBBFAC,,, | Performed by: FAMILY MEDICINE

## 2017-10-04 PROCEDURE — 99214 OFFICE O/P EST MOD 30 MIN: CPT | Mod: S$GLB,,, | Performed by: FAMILY MEDICINE

## 2017-10-04 PROCEDURE — 36415 COLL VENOUS BLD VENIPUNCTURE: CPT | Mod: PO

## 2017-10-04 PROCEDURE — 80061 LIPID PANEL: CPT

## 2017-10-04 PROCEDURE — 80053 COMPREHEN METABOLIC PANEL: CPT

## 2017-10-04 RX ORDER — LEVOTHYROXINE SODIUM 25 UG/1
TABLET ORAL
Qty: 90 TABLET | Refills: 1 | Status: SHIPPED | OUTPATIENT
Start: 2017-10-04 | End: 2018-05-08 | Stop reason: SDUPTHER

## 2017-10-04 RX ORDER — ALLOPURINOL 100 MG/1
TABLET ORAL
Qty: 90 TABLET | Refills: 1 | Status: SHIPPED | OUTPATIENT
Start: 2017-10-04 | End: 2018-04-04 | Stop reason: SDUPTHER

## 2017-10-04 RX ORDER — DICLOFENAC SODIUM 10 MG/G
GEL TOPICAL
Qty: 100 G | Refills: 1 | Status: SHIPPED | OUTPATIENT
Start: 2017-10-04 | End: 2018-05-30 | Stop reason: SDUPTHER

## 2017-10-04 RX ORDER — ANASTROZOLE 1 MG/1
TABLET ORAL
Qty: 90 TABLET | Refills: 1 | Status: SHIPPED | OUTPATIENT
Start: 2017-10-04 | End: 2018-04-04 | Stop reason: SDUPTHER

## 2017-10-04 RX ORDER — MONTELUKAST SODIUM 10 MG/1
10 TABLET ORAL NIGHTLY
Qty: 90 TABLET | Refills: 1 | Status: SHIPPED | OUTPATIENT
Start: 2017-10-04 | End: 2018-04-04 | Stop reason: SDUPTHER

## 2017-10-04 RX ORDER — METOPROLOL SUCCINATE 50 MG/1
TABLET, EXTENDED RELEASE ORAL
Qty: 90 TABLET | Refills: 1 | Status: SHIPPED | OUTPATIENT
Start: 2017-10-04 | End: 2018-04-04 | Stop reason: SDUPTHER

## 2017-10-04 RX ORDER — OMEPRAZOLE 20 MG/1
20 CAPSULE, DELAYED RELEASE ORAL DAILY
Qty: 90 CAPSULE | Refills: 1 | Status: SHIPPED | OUTPATIENT
Start: 2017-10-04 | End: 2018-04-04 | Stop reason: SDUPTHER

## 2017-10-04 RX ORDER — PRAVASTATIN SODIUM 80 MG/1
80 TABLET ORAL NIGHTLY
Qty: 90 TABLET | Refills: 1 | Status: SHIPPED | OUTPATIENT
Start: 2017-10-04 | End: 2018-04-24 | Stop reason: SDUPTHER

## 2017-10-04 RX ORDER — AMLODIPINE BESYLATE 10 MG/1
TABLET ORAL
Qty: 90 TABLET | Refills: 1 | Status: SHIPPED | OUTPATIENT
Start: 2017-10-04 | End: 2018-04-04 | Stop reason: SDUPTHER

## 2017-10-04 RX ORDER — OXYBUTYNIN CHLORIDE 10 MG/1
TABLET, EXTENDED RELEASE ORAL
Qty: 90 TABLET | Refills: 1 | Status: SHIPPED | OUTPATIENT
Start: 2017-10-04 | End: 2018-05-08 | Stop reason: SDUPTHER

## 2017-10-04 NOTE — PATIENT INSTRUCTIONS
Exercise for a Healthier Heart  You may wonder how you can improve the health of your heart. If youre thinking about exercise, youre on the right track. You dont need to become an athlete, but you do need a certain amount of brisk exercise to help strengthen your heart. If you have been diagnosed with a heart condition, your doctor may recommend exercise to help stabilize your condition. To help make exercise a habit, choose safe, fun activities.     Exercise with a friend. When activity is fun, you're more likely to stick with it.     Be sure to check with your healthcare provider before starting an exercise program.   Why exercise?  Exercising regularly offers many healthy rewards. It can help you do all of the following:  · Improve your blood cholesterol level to help prevent further heart trouble  · Lower your blood pressure to help prevent a stroke or heart attack  · Control diabetes, or reduce your risk of getting this disease  · Improve your heart and lung function  · Reach and maintain a healthy weight  · Make your muscles stronger and more limber so you can stay active  · Prevent falls and fractures by slowing the loss of bone mass (osteoporosis)  · Manage stress better  · Reduce your blood pressure  · Improve your sense of self and your body image  Exercise tips  Ease into your routine. Set small goals. Then build on them.  Exercise on most days. Aim for a total of 150 or more minutes of moderate to  vigorous intensity activity each week. Consider 40 minutes, 3 to 4 times a week. For best results, activity should last for 40 minutes on average. It is OK to work up to the 40 minute period over time. Examples of moderate-intensity activity is walking 1 mile in 15 minutes or 30 to 45 minutes of yard work.  Step up your daily activity level. Along with your exercise program, try being more active throughout the day. Walk instead of drive. Do more household tasks or yard work.  Choose one or more  activities you enjoy. Walking is one of the easiest things you can do. You can also try swimming, riding a bike, dancing, or taking an exercise class.  Stop exercising and call your doctor if you:  · Have chest pain or feel dizzy or lightheaded  · Feel burning, tightness, pressure, or heaviness in your chest, neck, shoulders, back, or arms  · Have unusual shortness of breath  · Have increased joint or muscle pain  · Have palpitations or an irregular heartbeat   Date Last Reviewed: 5/1/2016  © 4715-4754 [employer]. [address] [place] [address]. All rights reserved. This information is not intended as a substitute for professional medical care. Always follow your healthcare professional's instructions.

## 2017-10-04 NOTE — PROGRESS NOTES
Routine Office Visit    Patient Name: Ivelisse Kern    : 1940  MRN: [id number]    Subjective:  Ivelisse is a 77 y.o. female who presents today for     1. Hypertension - here for blood pressure medication refill. She is doing well on current regimen. She does not exercise regularly.    2. Right foot /heel pain - worse at night. Left leg swelling - worse at night.   3 eczema - on hands, taking goldbond. It is helping with pain. Rash has improved with eczema cream. No itching.     Review of Systems   Constitutional: Negative for chills and fever.   HENT: Negative for congestion.    Eyes: Negative for blurred vision.   Respiratory: Negative for cough.    Cardiovascular: Negative for chest pain.   Gastrointestinal: Negative for abdominal pain, constipation, diarrhea, heartburn, nausea and vomiting.   Genitourinary: Negative for dysuria.   Musculoskeletal: Negative for myalgias.   Skin: Positive for rash. Negative for itching.   Neurological: Negative for dizziness and headaches.   Psychiatric/Behavioral: Negative for depression.       Active Problem List  Patient Active Problem List   Diagnosis    COPD (chronic obstructive pulmonary disease)    Essential hypertension    Hyperlipidemia    Urge and stress incontinence    Leg pain    GERD (gastroesophageal reflux disease)    Hypothyroidism    Anxiety    AR (allergic rhinitis)    CKD (chronic kidney disease) stage 3, GFR 30-59 ml/min    Vitamin D deficiency disease    Osteopenia    Breast cancer    Visit for monitoring Arimidex therapy    Hot flashes related to aromatase inhibitor therapy    Radiation dermatitis    Myalgia    Hyperuricemia    Bilateral cataracts    Secondary hyperparathyroidism       Past Surgical History  Past Surgical History:   Procedure Laterality Date    APPENDECTOMY      BREAST BIOPSY Right 4/15/2014    OVARIAN CYST REMOVAL      TONSILLECTOMY         Family History  Family History   Problem Relation Age of Onset    Cancer  Brother      lung       Social History  Social History     Social History    Marital status: Single     Spouse name: N/A    Number of children: N/A    Years of education: N/A     Occupational History    Not on file.     Social History Main Topics    Smoking status: Never Smoker    Smokeless tobacco: Never Used    Alcohol use No    Drug use: No    Sexual activity: Not Currently     Other Topics Concern    Not on file     Social History Narrative    No narrative on file       Medications and Allergies  Reviewed and updated.   Current Outpatient Prescriptions   Medication Sig    albuterol (PROAIR HFA) 90 mcg/actuation inhaler Inhale 1 puff into the lungs every 6 (six) hours as needed for Wheezing.    allopurinol (ZYLOPRIM) 100 MG tablet TAKE 1 TABLET(100 MG) BY MOUTH EVERY DAY    amlodipine (NORVASC) 10 MG tablet TAKE 1 TABLET(10 MG) BY MOUTH EVERY DAY    anastrozole (ARIMIDEX) 1 mg Tab TAKE 1 TABLET(1 MG) BY MOUTH EVERY DAY    calcium carbonate (OS-AMAURY) 500 mg calcium (1,250 mg) tablet Take 2 tablets (1,000 mg total) by mouth once daily.    cholecalciferol, vitamin D3, 400 unit Cap Take 2 capsules (800 Units total) by mouth once daily.    diclofenac sodium 1 % Gel APPLY 2 GRAMS EXTERNALLY TO THE AFFECTED AREA DAILY AS NEEDED    fluticasone (FLONASE) 50 mcg/actuation nasal spray INHALE 1 SPRAY IN EACH NOSTRIL EVERY DAY    levothyroxine (SYNTHROID) 25 MCG tablet TAKE 1 TABLET(25 MCG) BY MOUTH EVERY MORNING    metoprolol succinate (TOPROL-XL) 50 MG 24 hr tablet TAKE 1 TABLET(50 MG) BY MOUTH EVERY EVENING    montelukast (SINGULAIR) 10 mg tablet Take 1 tablet (10 mg total) by mouth every evening.    omeprazole (PRILOSEC) 20 MG capsule Take 1 capsule (20 mg total) by mouth once daily.    oxybutynin (DITROPAN-XL) 10 MG 24 hr tablet TAKE 1 TABLET(10 MG) BY MOUTH EVERY DAY    pravastatin (PRAVACHOL) 80 MG tablet Take 1 tablet (80 mg total) by mouth every evening.    tramadol (ULTRAM) 50 mg tablet TAKE  "1 TABLET BY MOUTH EVERY 8 HOURS AS NEEDED     No current facility-administered medications for this visit.        Physical Exam  /60 (BP Location: Left arm, Patient Position: Sitting, BP Method: Medium (Manual))   Pulse 84   Temp 98.3 °F (36.8 °C) (Oral)   Ht 5' 4" (1.626 m)   Wt 77.8 kg (171 lb 8.3 oz)   SpO2 96%   BMI 29.44 kg/m²   Physical Exam   Constitutional: She is oriented to person, place, and time. She appears well-developed and well-nourished.   HENT:   Head: Normocephalic and atraumatic.   Eyes: Conjunctivae and EOM are normal. Pupils are equal, round, and reactive to light.   Neck: Normal range of motion. Neck supple.   Cardiovascular: Normal rate, regular rhythm and normal heart sounds.  Exam reveals no gallop and no friction rub.    No murmur heard.  Pulmonary/Chest: Breath sounds normal. No respiratory distress.   Abdominal: Soft. Bowel sounds are normal. She exhibits no distension. There is no tenderness.   Musculoskeletal: Normal range of motion.   Lymphadenopathy:     She has no cervical adenopathy.   Neurological: She is alert and oriented to person, place, and time.   Skin: Skin is warm.   Psychiatric: She has a normal mood and affect.         Assessment/Plan:  Ivelisse Kern is a 77 y.o. female who presents today for :    Annual physical exam  Health Maintenance       Date Due Completion Date    DEXA SCAN 03/24/2017 3/24/2014    Override on 1/7/2011: Done (osteoporosis)    Influenza Vaccine 08/01/2017 3/15/2017    Mammogram 03/29/2018 3/29/2017    Override on 1/28/2011: Done    Lipid Panel 10/04/2018 10/4/2017        Essential hypertension  -     metoprolol succinate (TOPROL-XL) 50 MG 24 hr tablet; TAKE 1 TABLET(50 MG) BY MOUTH EVERY EVENING  Dispense: 90 tablet; Refill: 1  -     amlodipine (NORVASC) 10 MG tablet; TAKE 1 TABLET(10 MG) BY MOUTH EVERY DAY  Dispense: 90 tablet; Refill: 1  -     Lipid panel; Future; Expected date: 10/04/2017  -     Comprehensive metabolic panel; Future; " Expected date: 10/04/2017  -     PTH, intact; Future; Expected date: 10/04/2017  The current medical regimen is effective;  continue present plan and medications.    Gastroesophageal reflux disease, esophagitis presence not specified  -     omeprazole (PRILOSEC) 20 MG capsule; Take 1 capsule (20 mg total) by mouth once daily.  Dispense: 90 capsule; Refill: 1    Hypothyroidism, unspecified type  -     levothyroxine (SYNTHROID) 25 MCG tablet; TAKE 1 TABLET(25 MCG) BY MOUTH EVERY MORNING  Dispense: 90 tablet; Refill: 1    Hyperuricemia  -     allopurinol (ZYLOPRIM) 100 MG tablet; TAKE 1 TABLET(100 MG) BY MOUTH EVERY DAY  Dispense: 90 tablet; Refill: 1    Urinary incontinence, unspecified type  -     oxybutynin (DITROPAN-XL) 10 MG 24 hr tablet; TAKE 1 TABLET(10 MG) BY MOUTH EVERY DAY  Dispense: 90 tablet; Refill: 1    Hyperlipidemia, unspecified hyperlipidemia type  -     pravastatin (PRAVACHOL) 80 MG tablet; Take 1 tablet (80 mg total) by mouth every evening.  Dispense: 90 tablet; Refill: 1    Need for prophylactic vaccination and inoculation against influenza  -     Influenza - High Dose (65+) (PF) (IM)    Malignant neoplasm of lower-outer quadrant of right female breast, unspecified estrogen receptor status  -     anastrozole (ARIMIDEX) 1 mg Tab; TAKE 1 TABLET(1 MG) BY MOUTH EVERY DAY  Dispense: 90 tablet; Refill: 1    Allergic rhinitis, unspecified chronicity, unspecified seasonality, unspecified trigger  -     montelukast (SINGULAIR) 10 mg tablet; Take 1 tablet (10 mg total) by mouth every evening.  Dispense: 90 tablet; Refill: 1    Disease of bone  -     DXA Bone Density Spine And Hip; Future; Expected date: 10/04/2017    Secondary hyperparathyroidism  Elevated PTH  Continue to monitor     Other orders  -     diclofenac sodium 1 % Gel; APPLY 2 GRAMS EXTERNALLY TO THE AFFECTED AREA DAILY AS NEEDED  Dispense: 100 g; Refill: 1        Return in about 6 months (around 4/4/2018).

## 2017-10-05 ENCOUNTER — TELEPHONE (OUTPATIENT)
Dept: FAMILY MEDICINE | Facility: CLINIC | Age: 77
End: 2017-10-05

## 2017-10-05 NOTE — TELEPHONE ENCOUNTER
----- Message from Mile Young MD sent at 10/5/2017  2:33 PM CDT -----  Your triglycerides (a type of fat) were elevated. I recommend a low fat diet and regular exercise. I also recommend over the counter omega 3 fish oil daily (~1gm/day)  Please continue your pravastatin     You have chronic kidney disease. This is stable. Please continue your blood pressure medications.     Follow-up in 6 months.

## 2017-10-05 NOTE — PROGRESS NOTES
Your triglycerides (a type of fat) were elevated. I recommend a low fat diet and regular exercise. I also recommend over the counter omega 3 fish oil daily (~1gm/day)  Please continue your pravastatin     You have chronic kidney disease. This is stable. Please continue your blood pressure medications.     Follow-up in 6 months.

## 2017-10-09 ENCOUNTER — TELEPHONE (OUTPATIENT)
Dept: FAMILY MEDICINE | Facility: CLINIC | Age: 77
End: 2017-10-09

## 2017-10-09 NOTE — TELEPHONE ENCOUNTER
----- Message from Margie Scott sent at 10/9/2017 11:35 AM CDT -----  Contact: self  Patient has question about her test results. Patient can be reached at [phone].      Thanks,

## 2018-04-04 ENCOUNTER — OFFICE VISIT (OUTPATIENT)
Dept: FAMILY MEDICINE | Facility: CLINIC | Age: 78
End: 2018-04-04
Payer: MEDICARE

## 2018-04-04 VITALS
HEIGHT: 64 IN | OXYGEN SATURATION: 97 % | WEIGHT: 171.94 LBS | BODY MASS INDEX: 29.35 KG/M2 | SYSTOLIC BLOOD PRESSURE: 122 MMHG | DIASTOLIC BLOOD PRESSURE: 70 MMHG | TEMPERATURE: 98 F | HEART RATE: 98 BPM

## 2018-04-04 DIAGNOSIS — K21.9 GASTROESOPHAGEAL REFLUX DISEASE, ESOPHAGITIS PRESENCE NOT SPECIFIED: ICD-10-CM

## 2018-04-04 DIAGNOSIS — E79.0 HYPERURICEMIA: ICD-10-CM

## 2018-04-04 DIAGNOSIS — J44.9 CHRONIC OBSTRUCTIVE PULMONARY DISEASE, UNSPECIFIED COPD TYPE: Chronic | ICD-10-CM

## 2018-04-04 DIAGNOSIS — J30.9 ALLERGIC RHINITIS, UNSPECIFIED CHRONICITY, UNSPECIFIED SEASONALITY, UNSPECIFIED TRIGGER: ICD-10-CM

## 2018-04-04 DIAGNOSIS — M89.9 DISORDER OF BONE: Primary | ICD-10-CM

## 2018-04-04 DIAGNOSIS — N25.81 SECONDARY HYPERPARATHYROIDISM: Chronic | ICD-10-CM

## 2018-04-04 DIAGNOSIS — I10 ESSENTIAL HYPERTENSION: Chronic | ICD-10-CM

## 2018-04-04 DIAGNOSIS — C50.511 MALIGNANT NEOPLASM OF LOWER-OUTER QUADRANT OF RIGHT FEMALE BREAST, UNSPECIFIED ESTROGEN RECEPTOR STATUS: Chronic | ICD-10-CM

## 2018-04-04 PROCEDURE — 99214 OFFICE O/P EST MOD 30 MIN: CPT | Mod: S$GLB,,, | Performed by: FAMILY MEDICINE

## 2018-04-04 PROCEDURE — 99999 PR PBB SHADOW E&M-EST. PATIENT-LVL III: CPT | Mod: PBBFAC,,, | Performed by: FAMILY MEDICINE

## 2018-04-04 PROCEDURE — 3074F SYST BP LT 130 MM HG: CPT | Mod: CPTII,S$GLB,, | Performed by: FAMILY MEDICINE

## 2018-04-04 PROCEDURE — 3078F DIAST BP <80 MM HG: CPT | Mod: CPTII,S$GLB,, | Performed by: FAMILY MEDICINE

## 2018-04-04 RX ORDER — ALLOPURINOL 100 MG/1
TABLET ORAL
Qty: 90 TABLET | Refills: 1 | Status: SHIPPED | OUTPATIENT
Start: 2018-04-04 | End: 2018-09-26 | Stop reason: SDUPTHER

## 2018-04-04 RX ORDER — ANASTROZOLE 1 MG/1
TABLET ORAL
Qty: 90 TABLET | Refills: 1 | Status: SHIPPED | OUTPATIENT
Start: 2018-04-04 | End: 2018-09-26 | Stop reason: SDUPTHER

## 2018-04-04 RX ORDER — AMLODIPINE BESYLATE 10 MG/1
TABLET ORAL
Qty: 90 TABLET | Refills: 1 | Status: SHIPPED | OUTPATIENT
Start: 2018-04-04 | End: 2018-09-26 | Stop reason: SDUPTHER

## 2018-04-04 RX ORDER — OMEPRAZOLE 20 MG/1
20 CAPSULE, DELAYED RELEASE ORAL DAILY
Qty: 90 CAPSULE | Refills: 1 | Status: SHIPPED | OUTPATIENT
Start: 2018-04-04 | End: 2018-09-26 | Stop reason: SDUPTHER

## 2018-04-04 RX ORDER — FLUTICASONE PROPIONATE 50 MCG
1 SPRAY, SUSPENSION (ML) NASAL DAILY
Qty: 48 G | Refills: 1 | Status: SHIPPED | OUTPATIENT
Start: 2018-04-04 | End: 2018-04-04 | Stop reason: SDUPTHER

## 2018-04-04 RX ORDER — FLUTICASONE PROPIONATE 50 MCG
1 SPRAY, SUSPENSION (ML) NASAL DAILY
Qty: 48 G | Refills: 1 | Status: SHIPPED | OUTPATIENT
Start: 2018-04-04

## 2018-04-04 RX ORDER — METOPROLOL SUCCINATE 50 MG/1
TABLET, EXTENDED RELEASE ORAL
Qty: 90 TABLET | Refills: 1 | Status: SHIPPED | OUTPATIENT
Start: 2018-04-04 | End: 2018-09-26 | Stop reason: SDUPTHER

## 2018-04-04 RX ORDER — MONTELUKAST SODIUM 10 MG/1
10 TABLET ORAL NIGHTLY
Qty: 90 TABLET | Refills: 1 | Status: SHIPPED | OUTPATIENT
Start: 2018-04-04 | End: 2018-09-26 | Stop reason: SDUPTHER

## 2018-04-04 NOTE — PROGRESS NOTES
Routine Office Visit    Patient Name: Ivelisse Kern    : 1940  MRN: [id number]    Subjective:  Ivelisse is a 78 y.o. female who presents today for     1. Hypertension - 6 month follow-up - pt is doing well on current regimen. She has no issues/complaints.   2. Breast cancer - pt requesting refill on Arimidex. She states she completes her therapy next year.   3. Sinus congestion / asthma - she is requesting refill on her flonase. She states her asthma has improved since moving out of [place]. She is doing well on current regimen. No recent flare ups.     Review of Systems   Constitutional: Negative for chills and fever.   HENT: Negative for congestion.    Eyes: Negative for blurred vision.   Respiratory: Negative for cough.    Cardiovascular: Negative for chest pain.   Gastrointestinal: Negative for abdominal pain, constipation, diarrhea, heartburn, nausea and vomiting.   Genitourinary: Negative for dysuria.   Musculoskeletal: Negative for myalgias.   Skin: Negative for itching and rash.   Neurological: Negative for dizziness and headaches.   Psychiatric/Behavioral: Negative for depression.       Active Problem List  Patient Active Problem List   Diagnosis    COPD (chronic obstructive pulmonary disease)    Essential hypertension    Hyperlipidemia    Urge and stress incontinence    Leg pain    GERD (gastroesophageal reflux disease)    Hypothyroidism    Anxiety    AR (allergic rhinitis)    CKD (chronic kidney disease) stage 3, GFR 30-59 ml/min    Vitamin D deficiency disease    Osteopenia    Breast cancer    Visit for monitoring Arimidex therapy    Hot flashes related to aromatase inhibitor therapy    Radiation dermatitis    Myalgia    Hyperuricemia    Bilateral cataracts    Secondary hyperparathyroidism       Past Surgical History  Past Surgical History:   Procedure Laterality Date    APPENDECTOMY      BREAST BIOPSY Right 4/15/2014    OVARIAN CYST REMOVAL      TONSILLECTOMY         Family  History  Family History   Problem Relation Age of Onset    Cancer Brother      lung       Social History  Social History     Social History    Marital status: Single     Spouse name: N/A    Number of children: N/A    Years of education: N/A     Occupational History    Not on file.     Social History Main Topics    Smoking status: Never Smoker    Smokeless tobacco: Never Used    Alcohol use No    Drug use: No    Sexual activity: Not Currently     Other Topics Concern    Not on file     Social History Narrative    No narrative on file       Medications and Allergies  Reviewed and updated.   Current Outpatient Prescriptions   Medication Sig    albuterol (PROAIR HFA) 90 mcg/actuation inhaler Inhale 1 puff into the lungs every 6 (six) hours as needed for Wheezing.    allopurinol (ZYLOPRIM) 100 MG tablet TAKE 1 TABLET(100 MG) BY MOUTH EVERY DAY    amLODIPine (NORVASC) 10 MG tablet TAKE 1 TABLET(10 MG) BY MOUTH EVERY DAY    anastrozole (ARIMIDEX) 1 mg Tab TAKE 1 TABLET(1 MG) BY MOUTH EVERY DAY    calcium carbonate (OS-AMAURY) 500 mg calcium (1,250 mg) tablet Take 2 tablets (1,000 mg total) by mouth once daily.    cholecalciferol, vitamin D3, 400 unit Cap Take 2 capsules (800 Units total) by mouth once daily.    diclofenac sodium 1 % Gel APPLY 2 GRAMS EXTERNALLY TO THE AFFECTED AREA DAILY AS NEEDED    fluticasone (FLONASE) 50 mcg/actuation nasal spray 1 spray (50 mcg total) by Each Nare route once daily.    levothyroxine (SYNTHROID) 25 MCG tablet TAKE 1 TABLET(25 MCG) BY MOUTH EVERY MORNING    metoprolol succinate (TOPROL-XL) 50 MG 24 hr tablet TAKE 1 TABLET(50 MG) BY MOUTH EVERY EVENING    montelukast (SINGULAIR) 10 mg tablet Take 1 tablet (10 mg total) by mouth every evening.    omeprazole (PRILOSEC) 20 MG capsule Take 1 capsule (20 mg total) by mouth once daily.    oxybutynin (DITROPAN-XL) 10 MG 24 hr tablet TAKE 1 TABLET(10 MG) BY MOUTH EVERY DAY    pravastatin (PRAVACHOL) 80 MG tablet Take 1  "tablet (80 mg total) by mouth every evening.    tramadol (ULTRAM) 50 mg tablet TAKE 1 TABLET BY MOUTH EVERY 8 HOURS AS NEEDED     No current facility-administered medications for this visit.        Physical Exam  /70 (BP Location: Left arm, Patient Position: Sitting, BP Method: Large (Manual))   Pulse 98   Temp 98 °F (36.7 °C) (Oral)   Ht 5' 4" (1.626 m)   Wt 78 kg (171 lb 15.3 oz)   SpO2 97%   BMI 29.52 kg/m²   Physical Exam   Constitutional: She is oriented to person, place, and time. She appears well-developed and well-nourished.   HENT:   Head: Normocephalic and atraumatic.   Eyes: Conjunctivae and EOM are normal. Pupils are equal, round, and reactive to light.   Neck: Normal range of motion. Neck supple.   Cardiovascular: Normal rate, regular rhythm and normal heart sounds.  Exam reveals no gallop and no friction rub.    No murmur heard.  Pulmonary/Chest: Breath sounds normal. No respiratory distress.   Abdominal: Soft. Bowel sounds are normal. She exhibits no distension. There is no tenderness.   Musculoskeletal: Normal range of motion.   Lymphadenopathy:     She has no cervical adenopathy.   Neurological: She is alert and oriented to person, place, and time.   Skin: Skin is warm.   Psychiatric: She has a normal mood and affect.         Assessment/Plan:  Ivelisse Kern is a 78 y.o. female who presents today for :    Problem List Items Addressed This Visit        Pulmonary    COPD (chronic obstructive pulmonary disease) (Chronic)  The current medical regimen is effective;  continue present plan and medications.         Cardiac/Vascular    Essential hypertension (Chronic)    Relevant Medications    amLODIPine (NORVASC) 10 MG tablet    metoprolol succinate (TOPROL-XL) 50 MG 24 hr tablet  The current medical regimen is effective;  continue present plan and medications.         Oncology    Breast cancer (Chronic)    Relevant Medications    anastrozole (ARIMIDEX) 1 mg Tab  The current medical regimen is " effective;  continue present plan and medications.  Pt will end therapy next year         Endocrine    Secondary hyperparathyroidism (Chronic)  Noted in chart  Monitor PTH         GI    GERD (gastroesophageal reflux disease)    Relevant Medications    omeprazole (PRILOSEC) 20 MG capsule  The current medical regimen is effective;  continue present plan and medications.         Orthopedic    Hyperuricemia    Relevant Medications    allopurinol (ZYLOPRIM) 100 MG tablet  The current medical regimen is effective;  continue present plan and medications.         Other    AR (allergic rhinitis)    Relevant Medications    montelukast (SINGULAIR) 10 mg tablet  The current medical regimen is effective;  continue present plan and medications.  Recommend otc antihistamine prn         Other Visit Diagnoses     Disorder of bone    -  Primary    Relevant Orders    DXA Bone Density Spine And Hip            Follow-up in about 6 months (around 10/4/2018).

## 2018-04-11 ENCOUNTER — TELEPHONE (OUTPATIENT)
Dept: FAMILY MEDICINE | Facility: CLINIC | Age: 78
End: 2018-04-11

## 2018-04-11 DIAGNOSIS — Z85.3 HISTORY OF BREAST CANCER: Primary | ICD-10-CM

## 2018-04-11 NOTE — TELEPHONE ENCOUNTER
----- Message from Azul Howe sent at 4/11/2018  8:10 AM CDT -----  Contact: Self  Pt calling to speak to a nurse regarding health. Please call [phone].

## 2018-04-24 DIAGNOSIS — E78.5 HYPERLIPIDEMIA, UNSPECIFIED HYPERLIPIDEMIA TYPE: ICD-10-CM

## 2018-04-24 RX ORDER — PRAVASTATIN SODIUM 80 MG/1
TABLET ORAL
Qty: 90 TABLET | Refills: 0 | Status: SHIPPED | OUTPATIENT
Start: 2018-04-24 | End: 2018-07-21 | Stop reason: SDUPTHER

## 2018-05-08 DIAGNOSIS — R32 URINARY INCONTINENCE, UNSPECIFIED TYPE: ICD-10-CM

## 2018-05-08 DIAGNOSIS — E03.9 HYPOTHYROIDISM, UNSPECIFIED TYPE: ICD-10-CM

## 2018-05-08 RX ORDER — LEVOTHYROXINE SODIUM 25 UG/1
TABLET ORAL
Qty: 90 TABLET | Refills: 0 | Status: SHIPPED | OUTPATIENT
Start: 2018-05-08 | End: 2018-07-31 | Stop reason: SDUPTHER

## 2018-05-09 ENCOUNTER — TELEPHONE (OUTPATIENT)
Dept: FAMILY MEDICINE | Facility: CLINIC | Age: 78
End: 2018-05-09

## 2018-05-09 RX ORDER — OXYBUTYNIN CHLORIDE 10 MG/1
TABLET, EXTENDED RELEASE ORAL
Qty: 90 TABLET | Refills: 0 | Status: SHIPPED | OUTPATIENT
Start: 2018-05-09 | End: 2018-08-02 | Stop reason: SDUPTHER

## 2018-05-09 NOTE — TELEPHONE ENCOUNTER
----- Message from Eulalia Peterson sent at 5/9/2018  8:40 AM CDT -----  Contact: Self  Med refill: [phone]    Pt says pharmacy is saying they haven't heard from you to fill it. She would like you to send script of reach out to pharmacy.     oxybutynin (DITROPAN-XL) 10 MG 24 hr tablet      [facility]

## 2018-05-30 RX ORDER — DICLOFENAC SODIUM 10 MG/G
GEL TOPICAL
Qty: 100 G | Refills: 1 | Status: SHIPPED | OUTPATIENT
Start: 2018-05-30 | End: 2018-06-01 | Stop reason: SDUPTHER

## 2018-05-30 NOTE — TELEPHONE ENCOUNTER
----- Message from Dee Jamil sent at 5/30/2018  8:40 AM CDT -----  Contact: Lucrecia with PH/ [phone]  Lucrecia calling to inquire about RX for [diclofenac sodium 1 % Gel] and is requesting a call back from staff before 4pm tomorrow. Thank you.

## 2018-05-30 NOTE — TELEPHONE ENCOUNTER
Spoke to [facility] concerning the diclofenac sodium;  Patient does not have osteoarthritis and is one requirement for this medication to be covered. Otherwise patient can take ibuprofen.

## 2018-05-30 NOTE — TELEPHONE ENCOUNTER
----- Message from Dee Jamil sent at 5/30/2018  8:40 AM CDT -----  Contact: Lucrecia with PH/ [phone]  Lucrecia calling to inquire about RX for [diclofenac sodium 1 % Gel] and is requesting a call back from staff before 4pm tomorrow. Thank you.

## 2018-06-01 ENCOUNTER — TELEPHONE (OUTPATIENT)
Dept: FAMILY MEDICINE | Facility: CLINIC | Age: 78
End: 2018-06-01

## 2018-06-01 DIAGNOSIS — M19.90 OSTEOARTHRITIS, UNSPECIFIED OSTEOARTHRITIS TYPE, UNSPECIFIED SITE: ICD-10-CM

## 2018-06-01 DIAGNOSIS — K21.9 GASTROESOPHAGEAL REFLUX DISEASE, ESOPHAGITIS PRESENCE NOT SPECIFIED: ICD-10-CM

## 2018-06-01 DIAGNOSIS — N18.30 CKD (CHRONIC KIDNEY DISEASE) STAGE 3, GFR 30-59 ML/MIN: Primary | ICD-10-CM

## 2018-06-01 RX ORDER — DICLOFENAC SODIUM 10 MG/G
GEL TOPICAL
Qty: 100 G | Refills: 1 | Status: SHIPPED | OUTPATIENT
Start: 2018-06-01 | End: 2018-10-23

## 2018-06-01 NOTE — TELEPHONE ENCOUNTER
[facility] is denying the prescription for the diclofenac since patient does not have a  Diagnosis of oseoarthirits.

## 2018-06-06 ENCOUNTER — HOSPITAL ENCOUNTER (OUTPATIENT)
Dept: RADIOLOGY | Facility: HOSPITAL | Age: 78
Discharge: HOME | End: 2018-06-06
Attending: FAMILY MEDICINE
Payer: MEDICARE

## 2018-06-06 VITALS — HEIGHT: 64 IN | WEIGHT: 171 LBS | BODY MASS INDEX: 29.19 KG/M2

## 2018-06-06 DIAGNOSIS — Z85.3 HISTORY OF BREAST CANCER: ICD-10-CM

## 2018-06-06 PROCEDURE — 77062 BREAST TOMOSYNTHESIS BI: CPT | Mod: 26,,, | Performed by: RADIOLOGY

## 2018-06-06 PROCEDURE — 77066 DX MAMMO INCL CAD BI: CPT | Mod: TC

## 2018-06-06 PROCEDURE — 77066 DX MAMMO INCL CAD BI: CPT | Mod: 26,,, | Performed by: RADIOLOGY

## 2018-07-21 DIAGNOSIS — E78.5 HYPERLIPIDEMIA, UNSPECIFIED HYPERLIPIDEMIA TYPE: ICD-10-CM

## 2018-07-22 RX ORDER — PRAVASTATIN SODIUM 80 MG/1
TABLET ORAL
Qty: 90 TABLET | Refills: 0 | Status: SHIPPED | OUTPATIENT
Start: 2018-07-22 | End: 2018-10-23 | Stop reason: SDUPTHER

## 2018-07-31 DIAGNOSIS — E03.9 HYPOTHYROIDISM, UNSPECIFIED TYPE: ICD-10-CM

## 2018-07-31 RX ORDER — LEVOTHYROXINE SODIUM 25 UG/1
TABLET ORAL
Qty: 90 TABLET | Refills: 0 | Status: SHIPPED | OUTPATIENT
Start: 2018-07-31 | End: 2018-10-23 | Stop reason: SDUPTHER

## 2018-08-02 DIAGNOSIS — R32 URINARY INCONTINENCE, UNSPECIFIED TYPE: ICD-10-CM

## 2018-08-02 RX ORDER — OXYBUTYNIN CHLORIDE 10 MG/1
10 TABLET, EXTENDED RELEASE ORAL DAILY
Qty: 90 TABLET | Refills: 0 | Status: SHIPPED | OUTPATIENT
Start: 2018-08-02 | End: 2018-10-23 | Stop reason: SDUPTHER

## 2018-08-02 NOTE — TELEPHONE ENCOUNTER
----- Message from Joy Love sent at 8/2/2018  9:19 AM CDT -----  Contact: self  Refill: oxybutynin (DITROPAN-XL) 10 MG 24 hr tablet. [facility] [phone].    Pt [phone].    Thanks-

## 2018-09-26 DIAGNOSIS — K21.9 GASTROESOPHAGEAL REFLUX DISEASE, ESOPHAGITIS PRESENCE NOT SPECIFIED: ICD-10-CM

## 2018-09-26 DIAGNOSIS — I10 ESSENTIAL HYPERTENSION: Chronic | ICD-10-CM

## 2018-09-26 DIAGNOSIS — J30.9 ALLERGIC RHINITIS: ICD-10-CM

## 2018-09-26 DIAGNOSIS — E79.0 HYPERURICEMIA: ICD-10-CM

## 2018-09-26 DIAGNOSIS — C50.511 MALIGNANT NEOPLASM OF LOWER-OUTER QUADRANT OF RIGHT FEMALE BREAST, UNSPECIFIED ESTROGEN RECEPTOR STATUS: Chronic | ICD-10-CM

## 2018-09-26 RX ORDER — OMEPRAZOLE 20 MG/1
CAPSULE, DELAYED RELEASE ORAL
Qty: 90 CAPSULE | Refills: 0 | Status: SHIPPED | OUTPATIENT
Start: 2018-09-26 | End: 2018-12-19 | Stop reason: SDUPTHER

## 2018-09-26 RX ORDER — ALLOPURINOL 100 MG/1
TABLET ORAL
Qty: 90 TABLET | Refills: 0 | Status: SHIPPED | OUTPATIENT
Start: 2018-09-26 | End: 2018-12-19 | Stop reason: SDUPTHER

## 2018-09-26 RX ORDER — MONTELUKAST SODIUM 10 MG/1
TABLET ORAL
Qty: 90 TABLET | Refills: 0 | Status: SHIPPED | OUTPATIENT
Start: 2018-09-26 | End: 2018-12-19 | Stop reason: SDUPTHER

## 2018-09-26 RX ORDER — AMLODIPINE BESYLATE 10 MG/1
TABLET ORAL
Qty: 90 TABLET | Refills: 0 | Status: SHIPPED | OUTPATIENT
Start: 2018-09-26 | End: 2018-12-19 | Stop reason: SDUPTHER

## 2018-09-26 RX ORDER — METOPROLOL SUCCINATE 50 MG/1
TABLET, EXTENDED RELEASE ORAL
Qty: 90 TABLET | Refills: 0 | Status: SHIPPED | OUTPATIENT
Start: 2018-09-26 | End: 2018-12-19 | Stop reason: SDUPTHER

## 2018-09-26 RX ORDER — ANASTROZOLE 1 MG/1
TABLET ORAL
Qty: 90 TABLET | Refills: 0 | Status: SHIPPED | OUTPATIENT
Start: 2018-09-26 | End: 2018-12-19 | Stop reason: SDUPTHER

## 2018-10-23 ENCOUNTER — LAB VISIT (OUTPATIENT)
Dept: LAB | Facility: HOSPITAL | Age: 78
End: 2018-10-23
Attending: FAMILY MEDICINE
Payer: MEDICARE

## 2018-10-23 ENCOUNTER — OFFICE VISIT (OUTPATIENT)
Dept: FAMILY MEDICINE | Facility: CLINIC | Age: 78
End: 2018-10-23
Payer: MEDICARE

## 2018-10-23 VITALS
HEIGHT: 64 IN | BODY MASS INDEX: 29.74 KG/M2 | SYSTOLIC BLOOD PRESSURE: 100 MMHG | OXYGEN SATURATION: 95 % | HEART RATE: 90 BPM | DIASTOLIC BLOOD PRESSURE: 60 MMHG | TEMPERATURE: 98 F | WEIGHT: 174.19 LBS

## 2018-10-23 DIAGNOSIS — N18.30 CKD (CHRONIC KIDNEY DISEASE) STAGE 3, GFR 30-59 ML/MIN: Primary | ICD-10-CM

## 2018-10-23 DIAGNOSIS — M89.9 DISORDER OF BONE: ICD-10-CM

## 2018-10-23 DIAGNOSIS — M19.90 OSTEOARTHRITIS, UNSPECIFIED OSTEOARTHRITIS TYPE, UNSPECIFIED SITE: ICD-10-CM

## 2018-10-23 DIAGNOSIS — E03.9 HYPOTHYROIDISM, UNSPECIFIED TYPE: ICD-10-CM

## 2018-10-23 DIAGNOSIS — E78.5 HYPERLIPIDEMIA, UNSPECIFIED HYPERLIPIDEMIA TYPE: ICD-10-CM

## 2018-10-23 DIAGNOSIS — C50.511 MALIGNANT NEOPLASM OF LOWER-OUTER QUADRANT OF RIGHT FEMALE BREAST, UNSPECIFIED ESTROGEN RECEPTOR STATUS: ICD-10-CM

## 2018-10-23 DIAGNOSIS — R32 URINARY INCONTINENCE, UNSPECIFIED TYPE: ICD-10-CM

## 2018-10-23 DIAGNOSIS — Z23 NEED FOR PROPHYLACTIC VACCINATION AND INOCULATION AGAINST INFLUENZA: ICD-10-CM

## 2018-10-23 DIAGNOSIS — N25.81 SECONDARY HYPERPARATHYROIDISM: ICD-10-CM

## 2018-10-23 DIAGNOSIS — R73.09 ELEVATED RANDOM BLOOD GLUCOSE LEVEL: ICD-10-CM

## 2018-10-23 LAB
25(OH)D3+25(OH)D2 SERPL-MCNC: 69 NG/ML
ALBUMIN SERPL BCP-MCNC: 4 G/DL
ALP SERPL-CCNC: 105 U/L
ALT SERPL W/O P-5'-P-CCNC: 7 U/L
ANION GAP SERPL CALC-SCNC: 12 MMOL/L
AST SERPL-CCNC: 14 U/L
BASOPHILS # BLD AUTO: 0.11 K/UL
BASOPHILS NFR BLD: 1 %
BILIRUB SERPL-MCNC: 0.5 MG/DL
BUN SERPL-MCNC: 25 MG/DL
CALCIUM SERPL-MCNC: 10.8 MG/DL
CHLORIDE SERPL-SCNC: 103 MMOL/L
CHOLEST SERPL-MCNC: 239 MG/DL
CHOLEST/HDLC SERPL: 5.3 {RATIO}
CO2 SERPL-SCNC: 25 MMOL/L
CREAT SERPL-MCNC: 0.9 MG/DL
DIFFERENTIAL METHOD: ABNORMAL
EOSINOPHIL # BLD AUTO: 0.3 K/UL
EOSINOPHIL NFR BLD: 3.1 %
ERYTHROCYTE [DISTWIDTH] IN BLOOD BY AUTOMATED COUNT: 13.4 %
EST. GFR  (AFRICAN AMERICAN): >60 ML/MIN/1.73 M^2
EST. GFR  (NON AFRICAN AMERICAN): >60 ML/MIN/1.73 M^2
ESTIMATED AVG GLUCOSE: 103 MG/DL
GLUCOSE SERPL-MCNC: 104 MG/DL
HBA1C MFR BLD HPLC: 5.2 %
HCT VFR BLD AUTO: 39.4 %
HDLC SERPL-MCNC: 45 MG/DL
HDLC SERPL: 18.8 %
HGB BLD-MCNC: 12.5 G/DL
IMM GRANULOCYTES # BLD AUTO: 0.08 K/UL
IMM GRANULOCYTES NFR BLD AUTO: 0.7 %
LDLC SERPL CALC-MCNC: 159.8 MG/DL
LYMPHOCYTES # BLD AUTO: 2.4 K/UL
LYMPHOCYTES NFR BLD: 22.1 %
MCH RBC QN AUTO: 29.3 PG
MCHC RBC AUTO-ENTMCNC: 31.7 G/DL
MCV RBC AUTO: 93 FL
MONOCYTES # BLD AUTO: 0.8 K/UL
MONOCYTES NFR BLD: 7.5 %
NEUTROPHILS # BLD AUTO: 7.3 K/UL
NEUTROPHILS NFR BLD: 65.6 %
NONHDLC SERPL-MCNC: 194 MG/DL
NRBC BLD-RTO: 0 /100 WBC
PLATELET # BLD AUTO: 488 K/UL
PMV BLD AUTO: 9.8 FL
POTASSIUM SERPL-SCNC: 4.5 MMOL/L
PROT SERPL-MCNC: 8 G/DL
PTH-INTACT SERPL-MCNC: 65 PG/ML
RBC # BLD AUTO: 4.26 M/UL
SODIUM SERPL-SCNC: 140 MMOL/L
TRIGL SERPL-MCNC: 171 MG/DL
TSH SERPL DL<=0.005 MIU/L-ACNC: 2.07 UIU/ML
WBC # BLD AUTO: 11.06 K/UL

## 2018-10-23 PROCEDURE — 99213 OFFICE O/P EST LOW 20 MIN: CPT | Mod: PBBFAC,PO | Performed by: FAMILY MEDICINE

## 2018-10-23 PROCEDURE — 83036 HEMOGLOBIN GLYCOSYLATED A1C: CPT

## 2018-10-23 PROCEDURE — 85025 COMPLETE CBC W/AUTO DIFF WBC: CPT

## 2018-10-23 PROCEDURE — 36415 COLL VENOUS BLD VENIPUNCTURE: CPT | Mod: PO

## 2018-10-23 PROCEDURE — 80061 LIPID PANEL: CPT

## 2018-10-23 PROCEDURE — 82306 VITAMIN D 25 HYDROXY: CPT

## 2018-10-23 PROCEDURE — 84443 ASSAY THYROID STIM HORMONE: CPT

## 2018-10-23 PROCEDURE — 90662 IIV NO PRSV INCREASED AG IM: CPT | Mod: PBBFAC,PO

## 2018-10-23 PROCEDURE — 99999 PR PBB SHADOW E&M-EST. PATIENT-LVL III: CPT | Mod: PBBFAC,,, | Performed by: FAMILY MEDICINE

## 2018-10-23 PROCEDURE — 99214 OFFICE O/P EST MOD 30 MIN: CPT | Mod: S$PBB,,, | Performed by: FAMILY MEDICINE

## 2018-10-23 PROCEDURE — 1101F PT FALLS ASSESS-DOCD LE1/YR: CPT | Mod: CPTII,,, | Performed by: FAMILY MEDICINE

## 2018-10-23 PROCEDURE — 3078F DIAST BP <80 MM HG: CPT | Mod: CPTII,,, | Performed by: FAMILY MEDICINE

## 2018-10-23 PROCEDURE — 80053 COMPREHEN METABOLIC PANEL: CPT

## 2018-10-23 PROCEDURE — 83970 ASSAY OF PARATHORMONE: CPT

## 2018-10-23 PROCEDURE — 3074F SYST BP LT 130 MM HG: CPT | Mod: CPTII,,, | Performed by: FAMILY MEDICINE

## 2018-10-23 RX ORDER — PRAVASTATIN SODIUM 80 MG/1
80 TABLET ORAL DAILY
Qty: 90 TABLET | Refills: 1 | Status: SHIPPED | OUTPATIENT
Start: 2018-10-23 | End: 2019-03-25 | Stop reason: SDUPTHER

## 2018-10-23 RX ORDER — PRAVASTATIN SODIUM 80 MG/1
TABLET ORAL
Qty: 90 TABLET | Refills: 0 | Status: SHIPPED | OUTPATIENT
Start: 2018-10-23 | End: 2018-10-23 | Stop reason: SDUPTHER

## 2018-10-23 RX ORDER — OXYBUTYNIN CHLORIDE 10 MG/1
10 TABLET, EXTENDED RELEASE ORAL DAILY
Qty: 90 TABLET | Refills: 1 | Status: SHIPPED | OUTPATIENT
Start: 2018-10-23 | End: 2019-03-25 | Stop reason: SDUPTHER

## 2018-10-23 RX ORDER — LEVOTHYROXINE SODIUM 25 UG/1
TABLET ORAL
Qty: 90 TABLET | Refills: 1 | Status: SHIPPED | OUTPATIENT
Start: 2018-10-23 | End: 2019-03-25 | Stop reason: SDUPTHER

## 2018-10-24 NOTE — PROGRESS NOTES
Routine Office Visit    Patient Name: Ivelisse Kern    : 1940  MRN: [id number]    Subjective:  Ivelisse is a 78 y.o. female who presents today for     1. Hypertension - 6 month follow-up - pt is doing well on current regimen. She has no issues/complaints. She takes her prescription as prescribed. She is requesting a refill on medication. She reports no side effects from current regimen.   2. Breast cancer - she is still on Arimidex. She states she completes her therapy next year. She will need to follow-up with heme/onc next year.       Review of Systems   Constitutional: Negative for chills and fever.   HENT: Negative for congestion.    Eyes: Negative for blurred vision.   Respiratory: Negative for cough.    Cardiovascular: Negative for chest pain.   Gastrointestinal: Negative for abdominal pain, constipation, diarrhea, heartburn, nausea and vomiting.   Genitourinary: Negative for dysuria.   Musculoskeletal: Negative for myalgias.   Skin: Negative for itching and rash.   Neurological: Negative for dizziness and headaches.   Psychiatric/Behavioral: Negative for depression.       Active Problem List  Patient Active Problem List   Diagnosis    COPD (chronic obstructive pulmonary disease)    Essential hypertension    Hyperlipidemia    Urge and stress incontinence    Leg pain    GERD (gastroesophageal reflux disease)    Hypothyroidism    Anxiety    AR (allergic rhinitis)    CKD (chronic kidney disease) stage 3, GFR 30-59 ml/min    Vitamin D deficiency disease    Osteopenia    Breast cancer    Visit for monitoring Arimidex therapy    Hot flashes related to aromatase inhibitor therapy    Radiation dermatitis    Myalgia    Hyperuricemia    Bilateral cataracts    Secondary hyperparathyroidism       Past Surgical History  Past Surgical History:   Procedure Laterality Date    APPENDECTOMY      BIOPSY Right 2014    [facility]    BREAST BIOPSY Right 4/15/2014     INJECTION, FOR SENTINEL NODE IDENTIFICATION Right 5/7/2014    [facility]    LUMPECTOMY-BREAST Right 5/7/2014    [facility]    NEEDLE LOCALIZATION Right 5/7/2014    [facility]    OVARIAN CYST REMOVAL      TONSILLECTOMY         Family History  Family History   Problem Relation Age of Onset    Cancer Brother         lung       Social History  Social History     Socioeconomic History    Marital status: Single     Spouse name: Not on file    Number of children: Not on file    Years of education: Not on file    Highest education level: Not on file   Social Needs    Financial resource strain: Not on file    Food insecurity - worry: Not on file    Food insecurity - inability: Not on file    Transportation needs - medical: Not on file    Transportation needs - non-medical: Not on file   Occupational History    Not on file   Tobacco Use    Smoking status: Never Smoker    Smokeless tobacco: Never Used   Substance and Sexual Activity    Alcohol use: No    Drug use: No    Sexual activity: Not Currently   Other Topics Concern    Not on file   Social History Narrative    Not on file       Medications and Allergies  Reviewed and updated.   Current Outpatient Medications   Medication Sig    albuterol (PROAIR HFA) 90 mcg/actuation inhaler Inhale 1 puff into the lungs every 6 (six) hours as needed for Wheezing.    allopurinol (ZYLOPRIM) 100 MG tablet TAKE 1 TABLET(100 MG) BY MOUTH EVERY DAY    amLODIPine (NORVASC) 10 MG tablet TAKE 1 TABLET BY MOUTH EVERY DAY    anastrozole (ARIMIDEX) 1 mg Tab TAKE 1 TABLET BY MOUTH EVERY DAY    calcium carbonate (OS-AMAURY) 500 mg calcium (1,250 mg) tablet Take 2 tablets (1,000 mg total) by mouth once daily.    cholecalciferol, vitamin D3, 400 unit Cap Take 2 capsules (800 Units total) by mouth once daily.    fluticasone (FLONASE) 50 mcg/actuation nasal spray 1 spray (50  "mcg total) by Each Nare route once daily.    levothyroxine (SYNTHROID) 25 MCG tablet TAKE 1 TABLET(25 MCG) BY MOUTH EVERY MORNING    metoprolol succinate (TOPROL-XL) 50 MG 24 hr tablet TAKE 1 TABLET(50 MG) BY MOUTH EVERY EVENING    montelukast (SINGULAIR) 10 mg tablet TAKE 1 TABLET(10 MG) BY MOUTH EVERY EVENING    omeprazole (PRILOSEC) 20 MG capsule TAKE 1 CAPSULE(20 MG) BY MOUTH EVERY DAY    oxybutynin (DITROPAN-XL) 10 MG 24 hr tablet Take 1 tablet (10 mg total) by mouth once daily.    pravastatin (PRAVACHOL) 80 MG tablet Take 1 tablet (80 mg total) by mouth once daily.    tramadol (ULTRAM) 50 mg tablet TAKE 1 TABLET BY MOUTH EVERY 8 HOURS AS NEEDED     No current facility-administered medications for this visit.        Physical Exam  /60 (BP Location: Left arm, Patient Position: Sitting, BP Method: Large (Manual))   Pulse 90   Temp 98.2 °F (36.8 °C) (Oral)   Ht 5' 4" (1.626 m)   Wt 79 kg (174 lb 2.6 oz)   SpO2 95%   BMI 29.90 kg/m²   Physical Exam   Constitutional: She is oriented to person, place, and time. She appears well-developed and well-nourished.   HENT:   Head: Normocephalic and atraumatic.   Eyes: Conjunctivae and EOM are normal. Pupils are equal, round, and reactive to light.   Neck: Normal range of motion. Neck supple.   Cardiovascular: Normal rate, regular rhythm and normal heart sounds. Exam reveals no gallop and no friction rub.   No murmur heard.  Pulmonary/Chest: Breath sounds normal. No respiratory distress.   Abdominal: Soft. Bowel sounds are normal. She exhibits no distension. There is no tenderness.   Musculoskeletal: Normal range of motion.   Lymphadenopathy:     She has no cervical adenopathy.   Neurological: She is alert and oriented to person, place, and time.   Skin: Skin is warm.   Psychiatric: She has a normal mood and affect.         Assessment/Plan:  Ivelisse Kern is a 78 y.o. female who presents today for :    Problem List Items Addressed This Visit        " Cardiac/Vascular    Hyperlipidemia (Chronic)    Relevant Medications    pravastatin (PRAVACHOL) 80 MG tablet  The current medical regimen is effective;  continue present plan and medications.      Other Relevant Orders    Lipid panel (Completed)       Renal/    CKD (chronic kidney disease) stage 3, GFR 30-59 ml/min - Primary (Chronic)  Noted in chart  Continue to monitor         Oncology    Breast cancer (Chronic)  Continue arimidex   Recommend follow-up heme/onc. She does not want to follow up at this time.       Endocrine    Hypothyroidism (Chronic)    Relevant Medications    levothyroxine (SYNTHROID) 25 MCG tablet  The current medical regimen is effective;  continue present plan and medications.      Other Relevant Orders    TSH (Completed)    CBC auto differential (Completed)    Comprehensive metabolic panel (Completed)    Secondary hyperparathyroidism (Chronic)    Relevant Orders    Vitamin D (Completed)    PTH, intact (Completed)  Continue to follow-up.         Other Visit Diagnoses     Urinary incontinence, unspecified type        Relevant Medications    oxybutynin (DITROPAN-XL) 10 MG 24 hr tablet  The current medical regimen is effective;  continue present plan and medications.      Osteoarthritis, unspecified osteoarthritis type, unspecified site        Disorder of bone        Relevant Orders    DXA Bone Density Spine And Hip    Need for prophylactic vaccination and inoculation against influenza        Relevant Orders    Influenza - High Dose (65+) (PF) (IM) (Completed)    Elevated random blood glucose level        Relevant Orders    Hemoglobin A1c (Completed)            Follow-up in about 6 months (around 4/23/2019), or if symptoms worsen or fail to improve.

## 2018-12-19 DIAGNOSIS — J30.9 ALLERGIC RHINITIS: ICD-10-CM

## 2018-12-19 DIAGNOSIS — E79.0 HYPERURICEMIA: ICD-10-CM

## 2018-12-19 DIAGNOSIS — K21.9 GASTROESOPHAGEAL REFLUX DISEASE, ESOPHAGITIS PRESENCE NOT SPECIFIED: ICD-10-CM

## 2018-12-19 DIAGNOSIS — I10 ESSENTIAL HYPERTENSION: Chronic | ICD-10-CM

## 2018-12-19 DIAGNOSIS — C50.511 MALIGNANT NEOPLASM OF LOWER-OUTER QUADRANT OF RIGHT FEMALE BREAST, UNSPECIFIED ESTROGEN RECEPTOR STATUS: Chronic | ICD-10-CM

## 2018-12-19 RX ORDER — ALLOPURINOL 100 MG/1
TABLET ORAL
Qty: 90 TABLET | Refills: 0 | Status: SHIPPED | OUTPATIENT
Start: 2018-12-19 | End: 2020-08-07 | Stop reason: SDUPTHER

## 2018-12-19 RX ORDER — AMLODIPINE BESYLATE 10 MG/1
TABLET ORAL
Qty: 90 TABLET | Refills: 0 | Status: SHIPPED | OUTPATIENT
Start: 2018-12-19 | End: 2019-03-25 | Stop reason: SDUPTHER

## 2018-12-19 RX ORDER — ANASTROZOLE 1 MG/1
TABLET ORAL
Qty: 90 TABLET | Refills: 0 | Status: SHIPPED | OUTPATIENT
Start: 2018-12-19 | End: 2019-03-25 | Stop reason: SDUPTHER

## 2018-12-19 RX ORDER — OMEPRAZOLE 20 MG/1
CAPSULE, DELAYED RELEASE ORAL
Qty: 90 CAPSULE | Refills: 0 | Status: SHIPPED | OUTPATIENT
Start: 2018-12-19 | End: 2019-03-25 | Stop reason: SDUPTHER

## 2018-12-19 RX ORDER — METOPROLOL SUCCINATE 50 MG/1
TABLET, EXTENDED RELEASE ORAL
Qty: 90 TABLET | Refills: 0 | Status: SHIPPED | OUTPATIENT
Start: 2018-12-19 | End: 2019-03-25 | Stop reason: SDUPTHER

## 2018-12-19 RX ORDER — MONTELUKAST SODIUM 10 MG/1
TABLET ORAL
Qty: 90 TABLET | Refills: 0 | Status: SHIPPED | OUTPATIENT
Start: 2018-12-19 | End: 2019-03-25 | Stop reason: SDUPTHER

## 2019-03-25 ENCOUNTER — OFFICE VISIT (OUTPATIENT)
Dept: FAMILY MEDICINE | Facility: CLINIC | Age: 79
End: 2019-03-25
Payer: MEDICARE

## 2019-03-25 VITALS
WEIGHT: 174.63 LBS | TEMPERATURE: 98 F | DIASTOLIC BLOOD PRESSURE: 68 MMHG | SYSTOLIC BLOOD PRESSURE: 120 MMHG | HEIGHT: 64 IN | HEART RATE: 59 BPM | BODY MASS INDEX: 29.81 KG/M2 | OXYGEN SATURATION: 96 %

## 2019-03-25 DIAGNOSIS — E03.9 HYPOTHYROIDISM, UNSPECIFIED TYPE: ICD-10-CM

## 2019-03-25 DIAGNOSIS — Z51.81 VISIT FOR MONITORING ARIMIDEX THERAPY: Primary | ICD-10-CM

## 2019-03-25 DIAGNOSIS — K21.9 GASTROESOPHAGEAL REFLUX DISEASE, ESOPHAGITIS PRESENCE NOT SPECIFIED: ICD-10-CM

## 2019-03-25 DIAGNOSIS — Z79.811 VISIT FOR MONITORING ARIMIDEX THERAPY: Primary | ICD-10-CM

## 2019-03-25 DIAGNOSIS — E78.5 HYPERLIPIDEMIA, UNSPECIFIED HYPERLIPIDEMIA TYPE: ICD-10-CM

## 2019-03-25 DIAGNOSIS — J30.9 ALLERGIC RHINITIS, UNSPECIFIED SEASONALITY, UNSPECIFIED TRIGGER: ICD-10-CM

## 2019-03-25 DIAGNOSIS — C50.511 MALIGNANT NEOPLASM OF LOWER-OUTER QUADRANT OF RIGHT FEMALE BREAST, UNSPECIFIED ESTROGEN RECEPTOR STATUS: Chronic | ICD-10-CM

## 2019-03-25 DIAGNOSIS — M85.80 OSTEOPENIA, UNSPECIFIED LOCATION: ICD-10-CM

## 2019-03-25 DIAGNOSIS — J44.9 CHRONIC OBSTRUCTIVE PULMONARY DISEASE, UNSPECIFIED COPD TYPE: ICD-10-CM

## 2019-03-25 DIAGNOSIS — N18.30 CKD (CHRONIC KIDNEY DISEASE) STAGE 3, GFR 30-59 ML/MIN: ICD-10-CM

## 2019-03-25 DIAGNOSIS — R32 URINARY INCONTINENCE, UNSPECIFIED TYPE: ICD-10-CM

## 2019-03-25 DIAGNOSIS — E55.9 VITAMIN D DEFICIENCY: ICD-10-CM

## 2019-03-25 DIAGNOSIS — I10 ESSENTIAL HYPERTENSION: Chronic | ICD-10-CM

## 2019-03-25 DIAGNOSIS — N25.81 SECONDARY HYPERPARATHYROIDISM: Chronic | ICD-10-CM

## 2019-03-25 PROCEDURE — 3078F DIAST BP <80 MM HG: CPT | Mod: CPTII,S$GLB,, | Performed by: FAMILY MEDICINE

## 2019-03-25 PROCEDURE — 99999 PR PBB SHADOW E&M-EST. PATIENT-LVL III: CPT | Mod: PBBFAC,,, | Performed by: FAMILY MEDICINE

## 2019-03-25 PROCEDURE — 99999 PR PBB SHADOW E&M-EST. PATIENT-LVL III: ICD-10-PCS | Mod: PBBFAC,,, | Performed by: FAMILY MEDICINE

## 2019-03-25 PROCEDURE — 3078F PR MOST RECENT DIASTOLIC BLOOD PRESSURE < 80 MM HG: ICD-10-PCS | Mod: CPTII,S$GLB,, | Performed by: FAMILY MEDICINE

## 2019-03-25 PROCEDURE — 99499 RISK ADDL DX/OHS AUDIT: ICD-10-PCS | Mod: S$PBB,,, | Performed by: FAMILY MEDICINE

## 2019-03-25 PROCEDURE — 99214 PR OFFICE/OUTPT VISIT, EST, LEVL IV, 30-39 MIN: ICD-10-PCS | Mod: S$GLB,,, | Performed by: FAMILY MEDICINE

## 2019-03-25 PROCEDURE — 1101F PR PT FALLS ASSESS DOC 0-1 FALLS W/OUT INJ PAST YR: ICD-10-PCS | Mod: CPTII,S$GLB,, | Performed by: FAMILY MEDICINE

## 2019-03-25 PROCEDURE — 99214 OFFICE O/P EST MOD 30 MIN: CPT | Mod: S$GLB,,, | Performed by: FAMILY MEDICINE

## 2019-03-25 PROCEDURE — 1101F PT FALLS ASSESS-DOCD LE1/YR: CPT | Mod: CPTII,S$GLB,, | Performed by: FAMILY MEDICINE

## 2019-03-25 PROCEDURE — 3074F SYST BP LT 130 MM HG: CPT | Mod: CPTII,S$GLB,, | Performed by: FAMILY MEDICINE

## 2019-03-25 PROCEDURE — 99499 UNLISTED E&M SERVICE: CPT | Mod: S$GLB,,, | Performed by: FAMILY MEDICINE

## 2019-03-25 PROCEDURE — 3074F PR MOST RECENT SYSTOLIC BLOOD PRESSURE < 130 MM HG: ICD-10-PCS | Mod: CPTII,S$GLB,, | Performed by: FAMILY MEDICINE

## 2019-03-25 RX ORDER — METOPROLOL SUCCINATE 50 MG/1
TABLET, EXTENDED RELEASE ORAL
Qty: 90 TABLET | Refills: 1 | Status: SHIPPED | OUTPATIENT
Start: 2019-03-25 | End: 2019-09-27 | Stop reason: SDUPTHER

## 2019-03-25 RX ORDER — ALBUTEROL SULFATE 90 UG/1
1 AEROSOL, METERED RESPIRATORY (INHALATION) EVERY 6 HOURS PRN
Qty: 25.5 G | Refills: 1 | Status: SHIPPED | OUTPATIENT
Start: 2019-03-25 | End: 2023-05-16

## 2019-03-25 RX ORDER — MONTELUKAST SODIUM 10 MG/1
TABLET ORAL
Qty: 90 TABLET | Refills: 1 | Status: SHIPPED | OUTPATIENT
Start: 2019-03-25 | End: 2019-09-27 | Stop reason: SDUPTHER

## 2019-03-25 RX ORDER — AMLODIPINE BESYLATE 10 MG/1
10 TABLET ORAL DAILY
Qty: 90 TABLET | Refills: 1 | Status: SHIPPED | OUTPATIENT
Start: 2019-03-25 | End: 2019-09-27 | Stop reason: SDUPTHER

## 2019-03-25 RX ORDER — OXYBUTYNIN CHLORIDE 10 MG/1
10 TABLET, EXTENDED RELEASE ORAL DAILY
Qty: 90 TABLET | Refills: 1 | Status: SHIPPED | OUTPATIENT
Start: 2019-03-25 | End: 2019-09-27 | Stop reason: SDUPTHER

## 2019-03-25 RX ORDER — CALCIUM CARB/VITAMIN D3/VIT K1 500-500-40
800 TABLET,CHEWABLE ORAL DAILY
Qty: 180 CAPSULE | Refills: 1 | Status: SHIPPED | OUTPATIENT
Start: 2019-03-25

## 2019-03-25 RX ORDER — PRAVASTATIN SODIUM 80 MG/1
80 TABLET ORAL DAILY
Qty: 90 TABLET | Refills: 1 | Status: SHIPPED | OUTPATIENT
Start: 2019-03-25 | End: 2019-09-27 | Stop reason: SDUPTHER

## 2019-03-25 RX ORDER — LEVOTHYROXINE SODIUM 25 UG/1
TABLET ORAL
Qty: 90 TABLET | Refills: 1 | Status: SHIPPED | OUTPATIENT
Start: 2019-03-25 | End: 2019-09-27 | Stop reason: SDUPTHER

## 2019-03-25 RX ORDER — ANASTROZOLE 1 MG/1
1 TABLET ORAL DAILY
Qty: 90 TABLET | Refills: 0 | Status: SHIPPED | OUTPATIENT
Start: 2019-03-25 | End: 2021-08-09 | Stop reason: ALTCHOICE

## 2019-03-25 RX ORDER — OMEPRAZOLE 20 MG/1
CAPSULE, DELAYED RELEASE ORAL
Qty: 90 CAPSULE | Refills: 1 | Status: SHIPPED | OUTPATIENT
Start: 2019-03-25 | End: 2019-09-27 | Stop reason: SDUPTHER

## 2019-03-25 NOTE — PROGRESS NOTES
Routine Office Visit    Patient Name: Ivelisse Kern    : 1940  MRN: [id number]    Subjective:  Ivelisse is a 79 y.o. female who presents today for     1. Hypertension - 6 month follow-up - pt is doing well on current regimen. She has no issues/complaints. She takes her prescription as prescribed. She is requesting a refill on medication. She reports no side effects from current regimen. She is requesting 90 days supply to be sent from mail order pharmacy.   2. Breast cancer - she is still on Arimidex. She states she completes her therapy this year. She does not have a heme/onc physician at this time. She will need a referral.     Review of Systems   Constitutional: Negative for chills and fever.   HENT: Negative for congestion.    Eyes: Negative for blurred vision.   Respiratory: Negative for cough.    Cardiovascular: Negative for chest pain.   Gastrointestinal: Negative for abdominal pain, constipation, diarrhea, heartburn, nausea and vomiting.   Genitourinary: Negative for dysuria.   Musculoskeletal: Negative for myalgias.   Skin: Negative for itching and rash.   Neurological: Negative for dizziness and headaches.   Psychiatric/Behavioral: Negative for depression.       Active Problem List  Patient Active Problem List   Diagnosis    COPD (chronic obstructive pulmonary disease)    Essential hypertension    Hyperlipidemia    Urge and stress incontinence    Leg pain    GERD (gastroesophageal reflux disease)    Hypothyroidism    Anxiety    AR (allergic rhinitis)    CKD (chronic kidney disease) stage 3, GFR 30-59 ml/min    Vitamin D deficiency disease    Osteopenia    Breast cancer    Visit for monitoring Arimidex therapy    Hot flashes related to aromatase inhibitor therapy    Radiation dermatitis    Myalgia    Hyperuricemia    Bilateral cataracts    Secondary hyperparathyroidism       Past Surgical History  Past Surgical History:   Procedure Laterality Date    APPENDECTOMY      BIOPSY Right  4/8/2014    Performed by [facility] at [facility] OR    BREAST BIOPSY Right 4/15/2014    INJECTION, FOR SENTINEL LYMPH NODE IDENTIFICATION Right 5/7/2014    Performed by [facility] at [facility] OR    LUMPECTOMY-BREAST Right 5/7/2014    [facility]    NEEDLE LOCALIZATION Right 5/7/2014    [facility] at [facility] OR    OVARIAN CYST REMOVAL      TONSILLECTOMY         Family History  Family History   Problem Relation Age of Onset    Cancer Brother         lung       Social History  Social History     Socioeconomic History    Marital status: Single     Spouse name: Not on file    Number of children: Not on file    Years of education: Not on file    Highest education level: Not on file   Occupational History    Not on file   Social Needs    Financial resource strain: Not on file    Food insecurity:     Worry: Not on file     Inability: Not on file    Transportation needs:     Medical: Not on file     Non-medical: Not on file   Tobacco Use    Smoking status: Never Smoker    Smokeless tobacco: Never Used   Substance and Sexual Activity    Alcohol use: No    Drug use: No    Sexual activity: Not Currently   Lifestyle    Physical activity:     Days per week: Not on file     Minutes per session: Not on file    Stress: Not on file   Relationships    Social connections:     Talks on phone: Not on file     Gets together: Not on file     Attends Yarsanism service: Not on file     Active member of club or organization: Not on file     Attends meetings of clubs or organizations: Not on file     Relationship status: Not on file    Intimate partner violence:     Fear of current or ex partner: Not on file     Emotionally abused: Not on file     Physically abused: Not on file     Forced sexual activity: Not on file   Other Topics Concern    Not on file   Social History Narrative    Not on file       Medications and Allergies  Reviewed and updated.  "  Current Outpatient Medications   Medication Sig    albuterol (PROAIR HFA) 90 mcg/actuation inhaler Inhale 1 puff into the lungs every 6 (six) hours as needed for Wheezing.    allopurinol (ZYLOPRIM) 100 MG tablet TAKE 1 TABLET(100 MG) BY MOUTH EVERY DAY    amLODIPine (NORVASC) 10 MG tablet Take 1 tablet (10 mg total) by mouth once daily.    anastrozole (ARIMIDEX) 1 mg Tab Take 1 tablet (1 mg total) by mouth once daily.    calcium carbonate (OS-AMAURY) 500 mg calcium (1,250 mg) tablet Take 2 tablets (1,000 mg total) by mouth once daily.    cholecalciferol, vitamin D3, 400 unit Cap Take 2 capsules (800 Units total) by mouth once daily.    fluticasone (FLONASE) 50 mcg/actuation nasal spray 1 spray (50 mcg total) by Each Nare route once daily.    levothyroxine (SYNTHROID) 25 MCG tablet TAKE 1 TABLET(25 MCG) BY MOUTH EVERY MORNING    metoprolol succinate (TOPROL-XL) 50 MG 24 hr tablet Take one table daily    montelukast (SINGULAIR) 10 mg tablet TAKE 1 TABLET(10 MG) BY MOUTH EVERY EVENING    omeprazole (PRILOSEC) 20 MG capsule TAKE 1 CAPSULE(20 MG) BY MOUTH EVERY DAY    oxybutynin (DITROPAN-XL) 10 MG 24 hr tablet Take 1 tablet (10 mg total) by mouth once daily.    pravastatin (PRAVACHOL) 80 MG tablet Take 1 tablet (80 mg total) by mouth once daily.    tramadol (ULTRAM) 50 mg tablet TAKE 1 TABLET BY MOUTH EVERY 8 HOURS AS NEEDED     No current facility-administered medications for this visit.        Physical Exam  /68 (BP Location: Left arm, Patient Position: Sitting, BP Method: Large (Manual))   Pulse (!) 59   Temp 97.6 °F (36.4 °C) (Oral)   Ht 5' 4" (1.626 m)   Wt 79.2 kg (174 lb 9.7 oz)   SpO2 96%   BMI 29.97 kg/m²   Physical Exam   Constitutional: She is oriented to person, place, and time. She appears well-developed and well-nourished.   HENT:   Head: Normocephalic and atraumatic.   Eyes: Pupils are equal, round, and reactive to light. Conjunctivae and EOM are normal.   Neck: Normal range of " motion. Neck supple.   Cardiovascular: Normal rate, regular rhythm and normal heart sounds. Exam reveals no gallop and no friction rub.   No murmur heard.  Pulmonary/Chest: Breath sounds normal. No respiratory distress.   Abdominal: Soft. Bowel sounds are normal. She exhibits no distension. There is no tenderness.   Musculoskeletal: Normal range of motion.   Lymphadenopathy:     She has no cervical adenopathy.   Neurological: She is alert and oriented to person, place, and time.   Skin: Skin is warm.   Psychiatric: She has a normal mood and affect.         Assessment/Plan:  Ivelisse Kern is a 79 y.o. female who presents today for :    Problem List Items Addressed This Visit        Pulmonary    COPD (chronic obstructive pulmonary disease) (Chronic)    Relevant Medications    albuterol (PROAIR HFA) 90 mcg/actuation inhaler  The current medical regimen is effective;  continue present plan and medications.         Cardiac/Vascular    Essential hypertension (Chronic)    Relevant Medications    amLODIPine (NORVASC) 10 MG tablet    metoprolol succinate (TOPROL-XL) 50 MG 24 hr tablet  The current medical regimen is effective;  continue present plan and medications.      Hyperlipidemia (Chronic)    Relevant Medications    pravastatin (PRAVACHOL) 80 MG tablet  The current medical regimen is effective;  continue present plan and medications.      Other Relevant Orders    CBC auto differential    Comprehensive metabolic panel    Lipid panel    TSH       Oncology    Breast cancer (Chronic)    Relevant Medications    anastrozole (ARIMIDEX) 1 mg Tab  Advise pt needs to followup with heme/onc for further recommendations for arimidex   Pt states she was to be on this for 5 years.       Other Relevant Orders    Ambulatory Referral to Hematology / Oncology    Visit for monitoring Arimidex therapy - Primary    Relevant Orders    Ambulatory Referral to Hematology / Oncology       Endocrine    Hypothyroidism (Chronic)    Relevant  Medications    levothyroxine (SYNTHROID) 25 MCG tablet  The current medical regimen is effective;  continue present plan and medications.      Secondary hyperparathyroidism (Chronic) / chronic kidney disease stage 3    Relevant Orders    PTH, intact  Noted in chart  Continue to monitor          GI    GERD (gastroesophageal reflux disease)    Relevant Medications    omeprazole (PRILOSEC) 20 MG capsule       Orthopedic    Osteopenia    Relevant Medications    cholecalciferol, vitamin D3, 400 unit Cap  The current medical regimen is effective;  continue present plan and medications.        Other Visit Diagnoses     Allergic rhinitis        Relevant Medications    montelukast (SINGULAIR) 10 mg tablet  The current medical regimen is effective;  continue present plan and medications.      Urinary incontinence, unspecified type        Relevant Medications    oxybutynin (DITROPAN-XL) 10 MG 24 hr tablet  The current medical regimen is effective;  continue present plan and medications.      Vitamin D deficiency        Relevant Orders    Vitamin D            Follow up in about 6 months (around 9/25/2019).

## 2019-04-15 ENCOUNTER — TELEPHONE (OUTPATIENT)
Dept: HEMATOLOGY/ONCOLOGY | Facility: CLINIC | Age: 79
End: 2019-04-15

## 2019-04-16 ENCOUNTER — INITIAL CONSULT (OUTPATIENT)
Dept: HEMATOLOGY/ONCOLOGY | Facility: CLINIC | Age: 79
End: 2019-04-16
Payer: MEDICARE

## 2019-04-16 VITALS
WEIGHT: 177.94 LBS | SYSTOLIC BLOOD PRESSURE: 119 MMHG | TEMPERATURE: 98 F | DIASTOLIC BLOOD PRESSURE: 72 MMHG | HEART RATE: 84 BPM | HEIGHT: 63 IN | OXYGEN SATURATION: 98 % | BODY MASS INDEX: 31.53 KG/M2

## 2019-04-16 DIAGNOSIS — Z79.811 ENCOUNTER FOR MONITORING AROMATASE INHIBITOR THERAPY: ICD-10-CM

## 2019-04-16 DIAGNOSIS — C50.911 MALIGNANT NEOPLASM OF RIGHT BREAST IN FEMALE, ESTROGEN RECEPTOR POSITIVE, UNSPECIFIED SITE OF BREAST: Primary | ICD-10-CM

## 2019-04-16 DIAGNOSIS — Z51.81 ENCOUNTER FOR MONITORING AROMATASE INHIBITOR THERAPY: ICD-10-CM

## 2019-04-16 DIAGNOSIS — Z17.0 MALIGNANT NEOPLASM OF RIGHT BREAST IN FEMALE, ESTROGEN RECEPTOR POSITIVE, UNSPECIFIED SITE OF BREAST: Primary | ICD-10-CM

## 2019-04-16 PROCEDURE — 3078F PR MOST RECENT DIASTOLIC BLOOD PRESSURE < 80 MM HG: ICD-10-PCS | Mod: CPTII,S$GLB,, | Performed by: INTERNAL MEDICINE

## 2019-04-16 PROCEDURE — 3078F DIAST BP <80 MM HG: CPT | Mod: CPTII,S$GLB,, | Performed by: INTERNAL MEDICINE

## 2019-04-16 PROCEDURE — 1101F PR PT FALLS ASSESS DOC 0-1 FALLS W/OUT INJ PAST YR: ICD-10-PCS | Mod: CPTII,S$GLB,, | Performed by: INTERNAL MEDICINE

## 2019-04-16 PROCEDURE — 99999 PR PBB SHADOW E&M-EST. PATIENT-LVL IV: CPT | Mod: PBBFAC,,, | Performed by: INTERNAL MEDICINE

## 2019-04-16 PROCEDURE — 3074F PR MOST RECENT SYSTOLIC BLOOD PRESSURE < 130 MM HG: ICD-10-PCS | Mod: CPTII,S$GLB,, | Performed by: INTERNAL MEDICINE

## 2019-04-16 PROCEDURE — 99204 OFFICE O/P NEW MOD 45 MIN: CPT | Mod: S$GLB,,, | Performed by: INTERNAL MEDICINE

## 2019-04-16 PROCEDURE — 1101F PT FALLS ASSESS-DOCD LE1/YR: CPT | Mod: CPTII,S$GLB,, | Performed by: INTERNAL MEDICINE

## 2019-04-16 PROCEDURE — 99204 PR OFFICE/OUTPT VISIT, NEW, LEVL IV, 45-59 MIN: ICD-10-PCS | Mod: S$GLB,,, | Performed by: INTERNAL MEDICINE

## 2019-04-16 PROCEDURE — 3074F SYST BP LT 130 MM HG: CPT | Mod: CPTII,S$GLB,, | Performed by: INTERNAL MEDICINE

## 2019-04-16 PROCEDURE — 99999 PR PBB SHADOW E&M-EST. PATIENT-LVL IV: ICD-10-PCS | Mod: PBBFAC,,, | Performed by: INTERNAL MEDICINE

## 2019-04-16 NOTE — LETTER
April 16, 2019      Mile Young MD  [address]  [place] [address]           [facility]  [address]  [place] [address]  Phone: [phone]          Patient: Ivelisse Kern   MR Number: [id number]   YOB: 1940   Date of Visit: 4/16/2019       Dear Dr. Mile Young:    Thank you for referring Ivelisse Kern to me for evaluation. Attached you will find relevant portions of my assessment and plan of care.    If you have questions, please do not hesitate to call me. I look forward to following Ivelisse Kern along with you.    Sincerely,    Balbina Loo MD    Enclosure  CC:  No Recipients    If you would like to receive this communication electronically, please contact [email or web address] or [phone] to request more information on [facility] access.    For providers and/or their staff who would like to refer a patient to [facility], please contact us through our one-stop-shop provider referral line, [facility] , at [phone].    If you feel you have received this communication in error or would no longer like to receive these types of communications, please e-mail [email or web address]

## 2019-04-16 NOTE — PROGRESS NOTES
Subjective:       Patient ID: Ivelisse Kern is a 79 y.o. female.    Chief Complaint: Consult    HPI       REASON FOR CONSULTATION; History of Breast CA diagnosed 2014  REFERRING PHYSICIAN; Mile Young MD      Diagnosis: Stage IA: IDC rt breast ER+/AL+, [id number]     1. S/p Lumpectomy and sentinal lymph node bx     2. S/p Breat and axillary radiation: 33 tx by Dr. Bennett     3. Adjuvant Anastrozole: Began Mid Aug 2014    Patient is a pleasant 79 with history of  infiltrating ductal carcinoma Right Breast: Stage IA: ER+/AL+, [id number] here to re establish care with Oncology     She underwent Lumpectomy and sentinal lymph node bx on 5/7/2014 . Pathology showed IDC 8mm  nodule on her breast. Margins negative. One sentinel lymph node was negative  For malignancy. She was staged [id number]  Stage 1A.  She is ER/AL+ her 2 negative. She underwent postoperative XRT at 160cGy per fraction to 4860cGy.with rt breast boost for   Total of 6060cGy.She began  Adjuvant Anastrozole: Mid Aug 2014          She remains on is Arimidex.  She is lost to follow-up with Oncology ( 2016)   She remains on AI    No complaints  Appetite and weight stable  No fatigue  No lightheadedness            Mammo 6/16/2018   Impression:  No mammographic evidence of malignancy.     BI-RADS Category 1: Negative        Past Medical History:   Diagnosis Date    AR (allergic rhinitis)     Arthritis     Asthma     Breast cancer     COPD (chronic obstructive pulmonary disease)     GERD (gastroesophageal reflux disease)     Hyperlipidemia     Hypertension     Hypothyroidism     Osteoporosis        Past Surgical History:   Procedure Laterality Date    APPENDECTOMY      BIOPSY Right 4/8/2014    Per[facility] OR    BREAST BIOPSY Right 4/15/2014    INJECTION, FOR SENTINEL LYMPH NODE IDENTIFICATION Right 5/7/2014    [facility]formed [facility] OR    LUMPECTOMY-BREAST Right 5/7/2014    Performed by Curt BRONSON  [facility] OR    NEEDLE LOCALIZATION Right 5/7/2014    [facility] OR    OVARIAN CYST REMOVAL      TONSILLECTOMY       Review of Systems   Constitutional: Negative for appetite change, fatigue, fever and unexpected weight change.   HENT: Negative for mouth sores.    Eyes: Negative for visual disturbance.   Respiratory: Negative for cough and shortness of breath.    Cardiovascular: Negative for chest pain.   Gastrointestinal: Negative for abdominal pain and diarrhea.   Genitourinary: Negative for frequency.   Musculoskeletal: Negative for back pain.   Skin: Negative for rash.   Neurological: Negative for headaches.   Hematological: Negative for adenopathy.   Psychiatric/Behavioral: The patient is not nervous/anxious.        Objective:        Vitals:    04/16/19 1031   BP: 119/72   BP Location: Right arm   Patient Position: Sitting   BP Method: Medium (Automatic)   Pulse: 84   Temp: 98.3 °F (36.8 °C)   TempSrc: Oral   SpO2: 98%   Weight: 80.7 kg (177 lb 14.6 oz)   Height: 5' 2.5" (1.588 m)       Physical Exam   Constitutional: She is oriented to person, place, and time. She appears well-developed and well-nourished.   HENT:   Head: Normocephalic.   Mouth/Throat: Oropharynx is clear and moist. No oropharyngeal exudate.   Eyes: Pupils are equal, round, and reactive to light. Conjunctivae and lids are normal. No scleral icterus.   Neck: Normal range of motion. Neck supple. No thyromegaly present.   Cardiovascular: Normal rate, regular rhythm and normal heart sounds.   No murmur heard.  Pulmonary/Chest: Breath sounds normal. She has no wheezes. She has no rales.   Abdominal: Soft. Bowel sounds are normal. She exhibits no distension and no mass. There is no hepatosplenomegaly. There is no tenderness. There is no rebound and no guarding.   Musculoskeletal: Normal range of motion. She exhibits no edema or tenderness.   Neurological: She is alert and oriented to person, place, and time. " No cranial nerve deficit. Coordination normal.   Skin: Skin is warm and dry. No ecchymosis, no petechiae and no rash noted. No erythema.   Psychiatric: She has a normal mood and affect.       Mammo 6/16/2018     Impression:  No mammographic evidence of malignancy.     BI-RADS Category 1: Negative       Assessment:       1. Malignant neoplasm of right breast in female, estrogen receptor positive, unspecified site of breast    2. Encounter for monitoring aromatase inhibitor therapy        Plan:     She willl continue with AI-tolerating wll  She will complete adjuvant endocrine therapy end of August   Mammo 6/16/2018  BI-RADS Category 1: Negative  Plan follow-up Mammo June 2019  Bone density planned in near future      All questions posed answered to patient's satisfaction    Follow-up Sept 2019    Thank you for allowing me to participate in the care of your patient       cc: Mile Young MD

## 2019-06-12 ENCOUNTER — HOSPITAL ENCOUNTER (OUTPATIENT)
Dept: RADIOLOGY | Facility: HOSPITAL | Age: 79
Discharge: HOME | End: 2019-06-12
Attending: INTERNAL MEDICINE
Payer: MEDICARE

## 2019-06-12 DIAGNOSIS — C50.911 MALIGNANT NEOPLASM OF RIGHT BREAST IN FEMALE, ESTROGEN RECEPTOR POSITIVE, UNSPECIFIED SITE OF BREAST: ICD-10-CM

## 2019-06-12 DIAGNOSIS — Z17.0 MALIGNANT NEOPLASM OF RIGHT BREAST IN FEMALE, ESTROGEN RECEPTOR POSITIVE, UNSPECIFIED SITE OF BREAST: ICD-10-CM

## 2019-06-12 PROCEDURE — 77062 BREAST TOMOSYNTHESIS BI: CPT | Mod: TC

## 2019-06-12 PROCEDURE — 77066 DX MAMMO INCL CAD BI: CPT | Mod: 26,,, | Performed by: RADIOLOGY

## 2019-06-12 PROCEDURE — 77066 MAMMO DIGITAL DIAGNOSTIC BILAT WITH TOMOSYNTHESIS_CAD: ICD-10-PCS | Mod: 26,,, | Performed by: RADIOLOGY

## 2019-06-12 PROCEDURE — 77062 MAMMO DIGITAL DIAGNOSTIC BILAT WITH TOMOSYNTHESIS_CAD: ICD-10-PCS | Mod: 26,,, | Performed by: RADIOLOGY

## 2019-06-12 PROCEDURE — 77062 BREAST TOMOSYNTHESIS BI: CPT | Mod: 26,,, | Performed by: RADIOLOGY

## 2019-09-18 ENCOUNTER — LAB VISIT (OUTPATIENT)
Dept: LAB | Facility: HOSPITAL | Age: 79
End: 2019-09-18
Attending: FAMILY MEDICINE
Payer: MEDICARE

## 2019-09-18 DIAGNOSIS — E78.5 HYPERLIPIDEMIA, UNSPECIFIED HYPERLIPIDEMIA TYPE: ICD-10-CM

## 2019-09-18 DIAGNOSIS — E55.9 VITAMIN D DEFICIENCY: ICD-10-CM

## 2019-09-18 DIAGNOSIS — N25.81 SECONDARY HYPERPARATHYROIDISM: Chronic | ICD-10-CM

## 2019-09-18 LAB
25(OH)D3+25(OH)D2 SERPL-MCNC: 50 NG/ML (ref 30–96)
ALBUMIN SERPL BCP-MCNC: 4 G/DL (ref 3.5–5.2)
ALP SERPL-CCNC: 100 U/L (ref 55–135)
ALT SERPL W/O P-5'-P-CCNC: 6 U/L (ref 10–44)
ANION GAP SERPL CALC-SCNC: 13 MMOL/L (ref 8–16)
AST SERPL-CCNC: 12 U/L (ref 10–40)
BASOPHILS # BLD AUTO: 0.07 K/UL (ref 0–0.2)
BASOPHILS NFR BLD: 0.8 % (ref 0–1.9)
BILIRUB SERPL-MCNC: 0.4 MG/DL (ref 0.1–1)
BUN SERPL-MCNC: 21 MG/DL (ref 8–23)
CALCIUM SERPL-MCNC: 9.7 MG/DL (ref 8.7–10.5)
CHLORIDE SERPL-SCNC: 106 MMOL/L (ref 95–110)
CHOLEST SERPL-MCNC: 240 MG/DL (ref 120–199)
CHOLEST/HDLC SERPL: 5 {RATIO} (ref 2–5)
CO2 SERPL-SCNC: 21 MMOL/L (ref 23–29)
CREAT SERPL-MCNC: 0.9 MG/DL (ref 0.5–1.4)
DIFFERENTIAL METHOD: ABNORMAL
EOSINOPHIL # BLD AUTO: 0.3 K/UL (ref 0–0.5)
EOSINOPHIL NFR BLD: 3.9 % (ref 0–8)
ERYTHROCYTE [DISTWIDTH] IN BLOOD BY AUTOMATED COUNT: 14.5 % (ref 11.5–14.5)
EST. GFR  (AFRICAN AMERICAN): >60 ML/MIN/1.73 M^2
EST. GFR  (NON AFRICAN AMERICAN): >60 ML/MIN/1.73 M^2
GLUCOSE SERPL-MCNC: 94 MG/DL (ref 70–110)
HCT VFR BLD AUTO: 39 % (ref 37–48.5)
HDLC SERPL-MCNC: 48 MG/DL (ref 40–75)
HDLC SERPL: 20 % (ref 20–50)
HGB BLD-MCNC: 12 G/DL (ref 12–16)
IMM GRANULOCYTES # BLD AUTO: 0.07 K/UL (ref 0–0.04)
IMM GRANULOCYTES NFR BLD AUTO: 0.8 % (ref 0–0.5)
LDLC SERPL CALC-MCNC: 160.6 MG/DL (ref 63–159)
LYMPHOCYTES # BLD AUTO: 2.2 K/UL (ref 1–4.8)
LYMPHOCYTES NFR BLD: 25.2 % (ref 18–48)
MCH RBC QN AUTO: 29.1 PG (ref 27–31)
MCHC RBC AUTO-ENTMCNC: 30.8 G/DL (ref 32–36)
MCV RBC AUTO: 94 FL (ref 82–98)
MONOCYTES # BLD AUTO: 0.7 K/UL (ref 0.3–1)
MONOCYTES NFR BLD: 8.3 % (ref 4–15)
NEUTROPHILS # BLD AUTO: 5.2 K/UL (ref 1.8–7.7)
NEUTROPHILS NFR BLD: 61 % (ref 38–73)
NONHDLC SERPL-MCNC: 192 MG/DL
NRBC BLD-RTO: 0 /100 WBC
PLATELET # BLD AUTO: 424 K/UL (ref 150–350)
PMV BLD AUTO: 9.6 FL (ref 9.2–12.9)
POTASSIUM SERPL-SCNC: 4.4 MMOL/L (ref 3.5–5.1)
PROT SERPL-MCNC: 7.5 G/DL (ref 6–8.4)
PTH-INTACT SERPL-MCNC: 84 PG/ML (ref 9–77)
RBC # BLD AUTO: 4.13 M/UL (ref 4–5.4)
SODIUM SERPL-SCNC: 140 MMOL/L (ref 136–145)
TRIGL SERPL-MCNC: 157 MG/DL (ref 30–150)
TSH SERPL DL<=0.005 MIU/L-ACNC: 1.97 UIU/ML (ref 0.4–4)
WBC # BLD AUTO: 8.52 K/UL (ref 3.9–12.7)

## 2019-09-18 PROCEDURE — 85025 COMPLETE CBC W/AUTO DIFF WBC: CPT

## 2019-09-18 PROCEDURE — 36415 COLL VENOUS BLD VENIPUNCTURE: CPT | Mod: PO

## 2019-09-18 PROCEDURE — 83970 ASSAY OF PARATHORMONE: CPT

## 2019-09-18 PROCEDURE — 82306 VITAMIN D 25 HYDROXY: CPT

## 2019-09-18 PROCEDURE — 80061 LIPID PANEL: CPT

## 2019-09-18 PROCEDURE — 84443 ASSAY THYROID STIM HORMONE: CPT

## 2019-09-18 PROCEDURE — 80053 COMPREHEN METABOLIC PANEL: CPT

## 2019-09-27 ENCOUNTER — OFFICE VISIT (OUTPATIENT)
Dept: FAMILY MEDICINE | Facility: CLINIC | Age: 79
End: 2019-09-27
Payer: MEDICARE

## 2019-09-27 VITALS
HEART RATE: 73 BPM | DIASTOLIC BLOOD PRESSURE: 70 MMHG | WEIGHT: 172.38 LBS | BODY MASS INDEX: 30.54 KG/M2 | SYSTOLIC BLOOD PRESSURE: 120 MMHG | HEIGHT: 63 IN | TEMPERATURE: 98 F | OXYGEN SATURATION: 97 %

## 2019-09-27 DIAGNOSIS — M89.9 DISORDER OF BONE: ICD-10-CM

## 2019-09-27 DIAGNOSIS — Z00.00 ANNUAL PHYSICAL EXAM: Primary | ICD-10-CM

## 2019-09-27 DIAGNOSIS — J30.9 ALLERGIC RHINITIS, UNSPECIFIED SEASONALITY, UNSPECIFIED TRIGGER: ICD-10-CM

## 2019-09-27 DIAGNOSIS — K21.9 GASTROESOPHAGEAL REFLUX DISEASE, ESOPHAGITIS PRESENCE NOT SPECIFIED: ICD-10-CM

## 2019-09-27 DIAGNOSIS — E78.5 HYPERLIPIDEMIA, UNSPECIFIED HYPERLIPIDEMIA TYPE: ICD-10-CM

## 2019-09-27 DIAGNOSIS — E03.9 HYPOTHYROIDISM, UNSPECIFIED TYPE: ICD-10-CM

## 2019-09-27 DIAGNOSIS — Z23 NEED FOR PROPHYLACTIC VACCINATION AND INOCULATION AGAINST INFLUENZA: ICD-10-CM

## 2019-09-27 DIAGNOSIS — R32 URINARY INCONTINENCE, UNSPECIFIED TYPE: ICD-10-CM

## 2019-09-27 DIAGNOSIS — J44.9 CHRONIC OBSTRUCTIVE PULMONARY DISEASE, UNSPECIFIED COPD TYPE: Chronic | ICD-10-CM

## 2019-09-27 DIAGNOSIS — N39.46 URGE AND STRESS INCONTINENCE: ICD-10-CM

## 2019-09-27 DIAGNOSIS — N25.81 SECONDARY HYPERPARATHYROIDISM: Chronic | ICD-10-CM

## 2019-09-27 DIAGNOSIS — I10 ESSENTIAL HYPERTENSION: Chronic | ICD-10-CM

## 2019-09-27 DIAGNOSIS — N18.30 CKD (CHRONIC KIDNEY DISEASE) STAGE 3, GFR 30-59 ML/MIN: Chronic | ICD-10-CM

## 2019-09-27 DIAGNOSIS — M85.80 OSTEOPENIA, UNSPECIFIED LOCATION: ICD-10-CM

## 2019-09-27 PROCEDURE — 3074F PR MOST RECENT SYSTOLIC BLOOD PRESSURE < 130 MM HG: ICD-10-PCS | Mod: CPTII,S$GLB,, | Performed by: FAMILY MEDICINE

## 2019-09-27 PROCEDURE — 99214 OFFICE O/P EST MOD 30 MIN: CPT | Mod: 25,S$GLB,, | Performed by: FAMILY MEDICINE

## 2019-09-27 PROCEDURE — 99999 PR PBB SHADOW E&M-EST. PATIENT-LVL III: ICD-10-PCS | Mod: PBBFAC,,, | Performed by: FAMILY MEDICINE

## 2019-09-27 PROCEDURE — 90662 FLU VACCINE - HIGH DOSE (65+) PRESERVATIVE FREE IM: ICD-10-PCS | Mod: S$GLB,,, | Performed by: FAMILY MEDICINE

## 2019-09-27 PROCEDURE — 99499 UNLISTED E&M SERVICE: CPT | Mod: S$GLB,,, | Performed by: FAMILY MEDICINE

## 2019-09-27 PROCEDURE — 3078F DIAST BP <80 MM HG: CPT | Mod: CPTII,S$GLB,, | Performed by: FAMILY MEDICINE

## 2019-09-27 PROCEDURE — 90662 IIV NO PRSV INCREASED AG IM: CPT | Mod: S$GLB,,, | Performed by: FAMILY MEDICINE

## 2019-09-27 PROCEDURE — 99499 RISK ADDL DX/OHS AUDIT: ICD-10-PCS | Mod: S$GLB,,, | Performed by: FAMILY MEDICINE

## 2019-09-27 PROCEDURE — G0008 ADMIN INFLUENZA VIRUS VAC: HCPCS | Mod: S$GLB,,, | Performed by: FAMILY MEDICINE

## 2019-09-27 PROCEDURE — 3074F SYST BP LT 130 MM HG: CPT | Mod: CPTII,S$GLB,, | Performed by: FAMILY MEDICINE

## 2019-09-27 PROCEDURE — 99214 PR OFFICE/OUTPT VISIT, EST, LEVL IV, 30-39 MIN: ICD-10-PCS | Mod: 25,S$GLB,, | Performed by: FAMILY MEDICINE

## 2019-09-27 PROCEDURE — 99999 PR PBB SHADOW E&M-EST. PATIENT-LVL III: CPT | Mod: PBBFAC,,, | Performed by: FAMILY MEDICINE

## 2019-09-27 PROCEDURE — G0008 FLU VACCINE - HIGH DOSE (65+) PRESERVATIVE FREE IM: ICD-10-PCS | Mod: S$GLB,,, | Performed by: FAMILY MEDICINE

## 2019-09-27 PROCEDURE — 3078F PR MOST RECENT DIASTOLIC BLOOD PRESSURE < 80 MM HG: ICD-10-PCS | Mod: CPTII,S$GLB,, | Performed by: FAMILY MEDICINE

## 2019-09-27 RX ORDER — MONTELUKAST SODIUM 10 MG/1
TABLET ORAL
Qty: 90 TABLET | Refills: 3 | Status: SHIPPED | OUTPATIENT
Start: 2019-09-27 | End: 2020-08-07 | Stop reason: SDUPTHER

## 2019-09-27 RX ORDER — OMEPRAZOLE 20 MG/1
CAPSULE, DELAYED RELEASE ORAL
Qty: 90 CAPSULE | Refills: 3 | Status: SHIPPED | OUTPATIENT
Start: 2019-09-27 | End: 2020-08-07 | Stop reason: SDUPTHER

## 2019-09-27 RX ORDER — AMLODIPINE BESYLATE 10 MG/1
10 TABLET ORAL DAILY
Qty: 90 TABLET | Refills: 3 | Status: SHIPPED | OUTPATIENT
Start: 2019-09-27 | End: 2020-08-07 | Stop reason: SDUPTHER

## 2019-09-27 RX ORDER — LEVOTHYROXINE SODIUM 25 UG/1
TABLET ORAL
Qty: 90 TABLET | Refills: 3 | Status: SHIPPED | OUTPATIENT
Start: 2019-09-27 | End: 2020-08-07 | Stop reason: SDUPTHER

## 2019-09-27 RX ORDER — PRAVASTATIN SODIUM 80 MG/1
80 TABLET ORAL DAILY
Qty: 90 TABLET | Refills: 3 | Status: SHIPPED | OUTPATIENT
Start: 2019-09-27 | End: 2020-08-07 | Stop reason: SDUPTHER

## 2019-09-27 RX ORDER — OXYBUTYNIN CHLORIDE 10 MG/1
10 TABLET, EXTENDED RELEASE ORAL DAILY
Qty: 90 TABLET | Refills: 3 | Status: SHIPPED | OUTPATIENT
Start: 2019-09-27 | End: 2020-08-07 | Stop reason: SDUPTHER

## 2019-09-27 RX ORDER — METOPROLOL SUCCINATE 50 MG/1
TABLET, EXTENDED RELEASE ORAL
Qty: 90 TABLET | Refills: 3 | Status: SHIPPED | OUTPATIENT
Start: 2019-09-27 | End: 2020-08-07 | Stop reason: SDUPTHER

## 2019-09-27 NOTE — PROGRESS NOTES
Routine Office Visit    Patient Name: Ivelisse Kern    : 1940  MRN: [id number]    Subjective:  Ivelisse is a 79 y.o. female who presents today for     1. Annual physical   2. Bilateral shoulder arthritis - she gets injections into her shoulders for pain when needed. Most recently was ~2 months ago. She sees Dr Dobbs at [facility].   3. Breast cancer - she is still on Arimidex. She has followed up with Dr. Loo. She is to follow-up again soon     Review of Systems   Constitutional: Negative for chills and fever.   HENT: Negative for congestion.    Eyes: Negative for blurred vision.   Respiratory: Negative for cough.    Cardiovascular: Negative for chest pain.   Gastrointestinal: Negative for abdominal pain, constipation, diarrhea, heartburn, nausea and vomiting.   Genitourinary: Negative for dysuria.   Musculoskeletal: Negative for myalgias.   Skin: Negative for itching and rash.   Neurological: Negative for dizziness and headaches.   Psychiatric/Behavioral: Negative for depression.       Active Problem List  Patient Active Problem List   Diagnosis    COPD (chronic obstructive pulmonary disease)    Essential hypertension    Hyperlipidemia    Urge and stress incontinence    Leg pain    GERD (gastroesophageal reflux disease)    Hypothyroidism    Anxiety    AR (allergic rhinitis)    CKD (chronic kidney disease) stage 3, GFR 30-59 ml/min    Vitamin D deficiency disease    Osteopenia    Breast cancer    Visit for monitoring Arimidex therapy    Hot flashes related to aromatase inhibitor therapy    Radiation dermatitis    Myalgia    Hyperuricemia    Bilateral cataracts    Secondary hyperparathyroidism       Past Surgical History  Past Surgical History:   Procedure Laterality Date    APPENDECTOMY      BREAST BIOPSY Right 4/15/2014    OVARIAN CYST REMOVAL      TONSILLECTOMY         Family History  Family History   Problem Relation Age of Onset    Cancer Brother         lung       Social  History  Social History     Socioeconomic History    Marital status: Single     Spouse name: Not on file    Number of children: Not on file    Years of education: Not on file    Highest education level: Not on file   Occupational History    Not on file   Social Needs    Financial resource strain: Not on file    Food insecurity:     Worry: Not on file     Inability: Not on file    Transportation needs:     Medical: Not on file     Non-medical: Not on file   Tobacco Use    Smoking status: Never Smoker    Smokeless tobacco: Never Used   Substance and Sexual Activity    Alcohol use: No    Drug use: No    Sexual activity: Not Currently   Lifestyle    Physical activity:     Days per week: Not on file     Minutes per session: Not on file    Stress: Only a little   Relationships    Social connections:     Talks on phone: Not on file     Gets together: Not on file     Attends Zoroastrian service: Not on file     Active member of club or organization: Not on file     Attends meetings of clubs or organizations: Not on file     Relationship status: Not on file   Other Topics Concern    Not on file   Social History Narrative    Not on file       Medications and Allergies  Reviewed and updated.   Current Outpatient Medications   Medication Sig    albuterol (PROAIR HFA) 90 mcg/actuation inhaler Inhale 1 puff into the lungs every 6 (six) hours as needed for Wheezing.    allopurinol (ZYLOPRIM) 100 MG tablet TAKE 1 TABLET(100 MG) BY MOUTH EVERY DAY    amLODIPine (NORVASC) 10 MG tablet Take 1 tablet (10 mg total) by mouth once daily.    anastrozole (ARIMIDEX) 1 mg Tab Take 1 tablet (1 mg total) by mouth once daily.    calcium carbonate (OS-AMAURY) 500 mg calcium (1,250 mg) tablet Take 2 tablets (1,000 mg total) by mouth once daily.    cholecalciferol, vitamin D3, 400 unit Cap Take 2 capsules (800 Units total) by mouth once daily.    fluticasone (FLONASE) 50 mcg/actuation nasal spray 1 spray (50 mcg total) by Each  "Nare route once daily.    levothyroxine (SYNTHROID) 25 MCG tablet TAKE 1 TABLET(25 MCG) BY MOUTH EVERY MORNING    metoprolol succinate (TOPROL-XL) 50 MG 24 hr tablet Take one table daily    montelukast (SINGULAIR) 10 mg tablet TAKE 1 TABLET(10 MG) BY MOUTH EVERY EVENING    omeprazole (PRILOSEC) 20 MG capsule TAKE 1 CAPSULE(20 MG) BY MOUTH EVERY DAY    oxybutynin (DITROPAN-XL) 10 MG 24 hr tablet Take 1 tablet (10 mg total) by mouth once daily.    pravastatin (PRAVACHOL) 80 MG tablet Take 1 tablet (80 mg total) by mouth once daily.    tramadol (ULTRAM) 50 mg tablet TAKE 1 TABLET BY MOUTH EVERY 8 HOURS AS NEEDED     No current facility-administered medications for this visit.        Physical Exam  /70 (BP Location: Left arm, Patient Position: Sitting, BP Method: Medium (Manual))   Pulse 73   Temp 97.9 °F (36.6 °C) (Oral)   Ht 5' 2.5" (1.588 m)   Wt 78.2 kg (172 lb 6.4 oz)   SpO2 97%   BMI 31.03 kg/m²   Physical Exam   Constitutional: She is oriented to person, place, and time. She appears well-developed and well-nourished.   HENT:   Head: Normocephalic and atraumatic.   Eyes: Pupils are equal, round, and reactive to light. Conjunctivae and EOM are normal.   Neck: Normal range of motion. Neck supple.   Cardiovascular: Normal rate, regular rhythm and normal heart sounds. Exam reveals no gallop and no friction rub.   No murmur heard.  Pulmonary/Chest: Breath sounds normal. No respiratory distress.   Abdominal: Soft. Bowel sounds are normal. She exhibits no distension. There is no tenderness.   Musculoskeletal: Normal range of motion.   Lymphadenopathy:     She has no cervical adenopathy.   Neurological: She is alert and oriented to person, place, and time.   Skin: Skin is warm.   Psychiatric: She has a normal mood and affect.         Assessment/Plan:  Ivelisse Kern is a 79 y.o. female who presents today for :    Problem List Items Addressed This Visit        Pulmonary    COPD (chronic obstructive " pulmonary disease) - Primary (Chronic)  The current medical regimen is effective;  continue present plan and medications.         Cardiac/Vascular    Essential hypertension (Chronic)    Relevant Medications    amLODIPine (NORVASC) 10 MG tablet    metoprolol succinate (TOPROL-XL) 50 MG 24 hr tablet  The current medical regimen is effective;  continue present plan and medications.      Hyperlipidemia (Chronic)    Relevant Medications    pravastatin (PRAVACHOL) 80 MG tablet  The current medical regimen is effective;  continue present plan and medications.  Recommend fish oil  Cholesterol level still high          Renal/    CKD (chronic kidney disease) stage 3, GFR 30-59 ml/min (Chronic)  Noted in chart  Continue to monitor       Urge and stress incontinence  Noted         Endocrine    Hypothyroidism (Chronic)    Relevant Medications    levothyroxine (SYNTHROID) 25 MCG tablet  The current medical regimen is effective;  continue present plan and medications.      Secondary hyperparathyroidism (Chronic)  2/2 renal  Continue to monitor  Recommend MVI with calcium and vitamin D         GI    GERD (gastroesophageal reflux disease)    Relevant Medications    omeprazole (PRILOSEC) 20 MG capsule       Orthopedic    Osteopenia    Relevant Orders    DXA Bone Density Spine And Hip       Other    AR (allergic rhinitis)    Relevant Medications    montelukast (SINGULAIR) 10 mg tablet  The current medical regimen is effective;  continue present plan and medications.        Other Visit Diagnoses     Urinary incontinence, unspecified type        Relevant Medications    oxybutynin (DITROPAN-XL) 10 MG 24 hr tablet  The current medical regimen is effective;  continue present plan and medications.      Need for prophylactic vaccination and inoculation against influenza        Relevant Orders    Influenza - High Dose (65+) (PF) (IM) (Completed)    Disorder of bone         Relevant Orders    DXA Bone Density Spine And Hip        Visit for  monitoring Arimidex therapy - Primary    Recommend to continue f/u with Dr. Loo       Follow up in about 6 months (around 3/27/2020), or if symptoms worsen or fail to improve.

## 2020-06-03 ENCOUNTER — TELEPHONE (OUTPATIENT)
Dept: FAMILY MEDICINE | Facility: CLINIC | Age: 80
End: 2020-06-03

## 2020-06-03 NOTE — TELEPHONE ENCOUNTER
Called ands spoke with patient and reviewed with her on how the process will go as far as checking in for her appointment on Monday.

## 2020-08-07 ENCOUNTER — OFFICE VISIT (OUTPATIENT)
Dept: FAMILY MEDICINE | Facility: CLINIC | Age: 80
End: 2020-08-07
Payer: MEDICARE

## 2020-08-07 VITALS
DIASTOLIC BLOOD PRESSURE: 72 MMHG | SYSTOLIC BLOOD PRESSURE: 120 MMHG | WEIGHT: 175 LBS | HEIGHT: 63 IN | OXYGEN SATURATION: 96 % | TEMPERATURE: 97 F | BODY MASS INDEX: 31.01 KG/M2 | HEART RATE: 74 BPM

## 2020-08-07 DIAGNOSIS — E03.9 HYPOTHYROIDISM, UNSPECIFIED TYPE: Primary | ICD-10-CM

## 2020-08-07 DIAGNOSIS — R32 URINARY INCONTINENCE, UNSPECIFIED TYPE: ICD-10-CM

## 2020-08-07 DIAGNOSIS — J44.9 CHRONIC OBSTRUCTIVE PULMONARY DISEASE, UNSPECIFIED COPD TYPE: ICD-10-CM

## 2020-08-07 DIAGNOSIS — N25.81 SECONDARY HYPERPARATHYROIDISM: ICD-10-CM

## 2020-08-07 DIAGNOSIS — Z11.59 ENCOUNTER FOR HEPATITIS C SCREENING TEST FOR LOW RISK PATIENT: ICD-10-CM

## 2020-08-07 DIAGNOSIS — Z85.3 HISTORY OF BREAST CANCER: ICD-10-CM

## 2020-08-07 DIAGNOSIS — K21.9 GASTROESOPHAGEAL REFLUX DISEASE, ESOPHAGITIS PRESENCE NOT SPECIFIED: ICD-10-CM

## 2020-08-07 DIAGNOSIS — N18.30 CKD (CHRONIC KIDNEY DISEASE) STAGE 3, GFR 30-59 ML/MIN: ICD-10-CM

## 2020-08-07 DIAGNOSIS — I10 ESSENTIAL HYPERTENSION: Chronic | ICD-10-CM

## 2020-08-07 DIAGNOSIS — J30.9 ALLERGIC RHINITIS, UNSPECIFIED SEASONALITY, UNSPECIFIED TRIGGER: ICD-10-CM

## 2020-08-07 DIAGNOSIS — E79.0 HYPERURICEMIA: ICD-10-CM

## 2020-08-07 DIAGNOSIS — E78.5 HYPERLIPIDEMIA, UNSPECIFIED HYPERLIPIDEMIA TYPE: ICD-10-CM

## 2020-08-07 PROCEDURE — 1159F PR MEDICATION LIST DOCUMENTED IN MEDICAL RECORD: ICD-10-PCS | Mod: S$GLB,,, | Performed by: FAMILY MEDICINE

## 2020-08-07 PROCEDURE — 99999 PR PBB SHADOW E&M-EST. PATIENT-LVL IV: CPT | Mod: PBBFAC,,, | Performed by: FAMILY MEDICINE

## 2020-08-07 PROCEDURE — 99214 PR OFFICE/OUTPT VISIT, EST, LEVL IV, 30-39 MIN: ICD-10-PCS | Mod: S$GLB,,, | Performed by: FAMILY MEDICINE

## 2020-08-07 PROCEDURE — 3288F PR FALLS RISK ASSESSMENT DOCUMENTED: ICD-10-PCS | Mod: CPTII,S$GLB,, | Performed by: FAMILY MEDICINE

## 2020-08-07 PROCEDURE — 1100F PTFALLS ASSESS-DOCD GE2>/YR: CPT | Mod: CPTII,S$GLB,, | Performed by: FAMILY MEDICINE

## 2020-08-07 PROCEDURE — 3078F PR MOST RECENT DIASTOLIC BLOOD PRESSURE < 80 MM HG: ICD-10-PCS | Mod: CPTII,S$GLB,, | Performed by: FAMILY MEDICINE

## 2020-08-07 PROCEDURE — 99214 OFFICE O/P EST MOD 30 MIN: CPT | Mod: S$GLB,,, | Performed by: FAMILY MEDICINE

## 2020-08-07 PROCEDURE — 3288F FALL RISK ASSESSMENT DOCD: CPT | Mod: CPTII,S$GLB,, | Performed by: FAMILY MEDICINE

## 2020-08-07 PROCEDURE — 1100F PR PT FALLS ASSESS DOC 2+ FALLS/FALL W/INJURY/YR: ICD-10-PCS | Mod: CPTII,S$GLB,, | Performed by: FAMILY MEDICINE

## 2020-08-07 PROCEDURE — 1159F MED LIST DOCD IN RCRD: CPT | Mod: S$GLB,,, | Performed by: FAMILY MEDICINE

## 2020-08-07 PROCEDURE — 3074F SYST BP LT 130 MM HG: CPT | Mod: CPTII,S$GLB,, | Performed by: FAMILY MEDICINE

## 2020-08-07 PROCEDURE — 3078F DIAST BP <80 MM HG: CPT | Mod: CPTII,S$GLB,, | Performed by: FAMILY MEDICINE

## 2020-08-07 PROCEDURE — 99999 PR PBB SHADOW E&M-EST. PATIENT-LVL IV: ICD-10-PCS | Mod: PBBFAC,,, | Performed by: FAMILY MEDICINE

## 2020-08-07 PROCEDURE — 1126F AMNT PAIN NOTED NONE PRSNT: CPT | Mod: S$GLB,,, | Performed by: FAMILY MEDICINE

## 2020-08-07 PROCEDURE — 99499 RISK ADDL DX/OHS AUDIT: ICD-10-PCS | Mod: S$GLB,,, | Performed by: FAMILY MEDICINE

## 2020-08-07 PROCEDURE — 99499 UNLISTED E&M SERVICE: CPT | Mod: S$GLB,,, | Performed by: FAMILY MEDICINE

## 2020-08-07 PROCEDURE — 1126F PR PAIN SEVERITY QUANTIFIED, NO PAIN PRESENT: ICD-10-PCS | Mod: S$GLB,,, | Performed by: FAMILY MEDICINE

## 2020-08-07 PROCEDURE — 3074F PR MOST RECENT SYSTOLIC BLOOD PRESSURE < 130 MM HG: ICD-10-PCS | Mod: CPTII,S$GLB,, | Performed by: FAMILY MEDICINE

## 2020-08-07 RX ORDER — METOPROLOL SUCCINATE 50 MG/1
TABLET, EXTENDED RELEASE ORAL
Qty: 90 TABLET | Refills: 3 | Status: SHIPPED | OUTPATIENT
Start: 2020-08-07 | End: 2020-08-10

## 2020-08-07 RX ORDER — MONTELUKAST SODIUM 10 MG/1
TABLET ORAL
Qty: 90 TABLET | Refills: 3 | Status: SHIPPED | OUTPATIENT
Start: 2020-08-07 | End: 2020-08-10

## 2020-08-07 RX ORDER — ALLOPURINOL 100 MG/1
100 TABLET ORAL DAILY
Qty: 90 TABLET | Refills: 3 | Status: SHIPPED | OUTPATIENT
Start: 2020-08-07 | End: 2021-08-09 | Stop reason: SDUPTHER

## 2020-08-07 RX ORDER — AMLODIPINE BESYLATE 10 MG/1
10 TABLET ORAL DAILY
Qty: 90 TABLET | Refills: 3 | Status: SHIPPED | OUTPATIENT
Start: 2020-08-07 | End: 2020-08-10

## 2020-08-07 RX ORDER — OXYBUTYNIN CHLORIDE 10 MG/1
10 TABLET, EXTENDED RELEASE ORAL DAILY
Qty: 90 TABLET | Refills: 3 | Status: SHIPPED | OUTPATIENT
Start: 2020-08-07 | End: 2020-08-10

## 2020-08-07 RX ORDER — OMEPRAZOLE 20 MG/1
CAPSULE, DELAYED RELEASE ORAL
Qty: 90 CAPSULE | Refills: 3 | Status: SHIPPED | OUTPATIENT
Start: 2020-08-07 | End: 2020-08-10

## 2020-08-07 RX ORDER — LEVOTHYROXINE SODIUM 25 UG/1
TABLET ORAL
Qty: 90 TABLET | Refills: 3 | Status: SHIPPED | OUTPATIENT
Start: 2020-08-07 | End: 2020-08-10

## 2020-08-07 RX ORDER — PRAVASTATIN SODIUM 80 MG/1
80 TABLET ORAL DAILY
Qty: 90 TABLET | Refills: 3 | Status: SHIPPED | OUTPATIENT
Start: 2020-08-07 | End: 2020-08-10

## 2020-08-07 NOTE — PROGRESS NOTES
Office Visit for Annual Exam    Patient Name: Ivelisse Kern    : 1940  MRN: [id number]    Subjective:  Ivelisse is a 80 y.o. female who presents today for     1.  Annual physical   2. Bilateral shoulder arthritis - she gets injections into her shoulders for pain when needed.  She sees Dr Dobbs at [facility]. She was advised she needed surgery but does not want surgery.   3. Hx of Breast cancer - she has stopped arimidex. She has not followed up with Dr. Loo.     Review of Systems   Constitutional: Negative for chills and fever.   HENT: Negative for congestion.    Eyes: Negative for blurred vision.   Respiratory: Negative for cough.    Cardiovascular: Negative for chest pain.   Gastrointestinal: Negative for abdominal pain, constipation, diarrhea, heartburn, nausea and vomiting.   Genitourinary: Negative for dysuria.   Musculoskeletal: Negative for myalgias.   Skin: Negative for itching and rash.   Neurological: Negative for dizziness and headaches.   Psychiatric/Behavioral: Negative for depression.       Subjective     Active Problem List  Patient Active Problem List   Diagnosis    COPD (chronic obstructive pulmonary disease)    Essential hypertension    Hyperlipidemia    Urge and stress incontinence    Leg pain    GERD (gastroesophageal reflux disease)    Hypothyroidism    Anxiety    AR (allergic rhinitis)    CKD (chronic kidney disease) stage 3, GFR 30-59 ml/min    Vitamin D deficiency disease    Osteopenia    Visit for monitoring Arimidex therapy    Hot flashes related to aromatase inhibitor therapy    Radiation dermatitis    Myalgia    Hyperuricemia    Bilateral cataracts    Secondary hyperparathyroidism    History of breast cancer       Past Surgical History  Past Surgical History:   Procedure Laterality Date    APPENDECTOMY      BREAST BIOPSY Right 4/15/2014    OVARIAN CYST REMOVAL      TONSILLECTOMY         Family History  Family History   Problem Relation Age of Onset     Cancer Brother         lung       Social History  Social History     Socioeconomic History    Marital status: Single     Spouse name: Not on file    Number of children: Not on file    Years of education: Not on file    Highest education level: Not on file   Occupational History    Not on file   Social Needs    Financial resource strain: Not on file    Food insecurity     Worry: Not on file     Inability: Not on file    Transportation needs     Medical: Not on file     Non-medical: Not on file   Tobacco Use    Smoking status: Never Smoker    Smokeless tobacco: Never Used   Substance and Sexual Activity    Alcohol use: No    Drug use: No    Sexual activity: Not Currently   Lifestyle    Physical activity     Days per week: Not on file     Minutes per session: Not on file    Stress: Only a little   Relationships    Social connections     Talks on phone: Not on file     Gets together: Not on file     Attends [employer] service: Not on file     Active member of club or organization: Not on file     Attends meetings of clubs or organizations: Not on file     Relationship status: Not on file   Other Topics Concern    Not on file   Social History Narrative    Not on file       Medications and Allergies  Reviewed and updated.   Current Outpatient Medications   Medication Sig    albuterol (PROAIR HFA) 90 mcg/actuation inhaler Inhale 1 puff into the lungs every 6 (six) hours as needed for Wheezing.    allopurinoL (ZYLOPRIM) 100 MG tablet Take 1 tablet (100 mg total) by mouth once daily.    amLODIPine (NORVASC) 10 MG tablet Take 1 tablet (10 mg total) by mouth once daily.    anastrozole (ARIMIDEX) 1 mg Tab Take 1 tablet (1 mg total) by mouth once daily.    calcium carbonate (OS-AMAURY) 500 mg calcium (1,250 mg) tablet Take 2 tablets (1,000 mg total) by mouth once daily.    cholecalciferol, vitamin D3, 400 unit Cap Take 2 capsules (800 Units total) by mouth once daily.    fluticasone (FLONASE) 50  "mcg/actuation nasal spray 1 spray (50 mcg total) by Each Nare route once daily.    levothyroxine (SYNTHROID) 25 MCG tablet TAKE 1 TABLET(25 MCG) BY MOUTH EVERY MORNING    metoprolol succinate (TOPROL-XL) 50 MG 24 hr tablet Take one table daily    montelukast (SINGULAIR) 10 mg tablet TAKE 1 TABLET(10 MG) BY MOUTH EVERY EVENING    omeprazole (PRILOSEC) 20 MG capsule TAKE 1 CAPSULE(20 MG) BY MOUTH EVERY DAY    oxybutynin (DITROPAN-XL) 10 MG 24 hr tablet Take 1 tablet (10 mg total) by mouth once daily.    pravastatin (PRAVACHOL) 80 MG tablet Take 1 tablet (80 mg total) by mouth once daily.    tramadol (ULTRAM) 50 mg tablet TAKE 1 TABLET BY MOUTH EVERY 8 HOURS AS NEEDED     No current facility-administered medications for this visit.        Objective      Physical Exam  /72 (BP Location: Right arm, Patient Position: Sitting, BP Method: Medium (Manual))   Pulse 74   Temp 97.1 °F (36.2 °C) (Temporal)   Ht 5' 2.5" (1.588 m)   Wt 79.4 kg (175 lb)   SpO2 96%   BMI 31.50 kg/m²   Physical Exam  Constitutional:       Appearance: She is well-developed.   HENT:      Head: Normocephalic and atraumatic.   Eyes:      Conjunctiva/sclera: Conjunctivae normal.      Pupils: Pupils are equal, round, and reactive to light.   Neck:      Musculoskeletal: Normal range of motion and neck supple.   Cardiovascular:      Rate and Rhythm: Normal rate and regular rhythm.      Heart sounds: Normal heart sounds. No murmur. No friction rub. No gallop.    Pulmonary:      Effort: No respiratory distress.      Breath sounds: Normal breath sounds.   Abdominal:      General: Bowel sounds are normal. There is no distension.      Palpations: Abdomen is soft.      Tenderness: There is no abdominal tenderness.   Musculoskeletal: Normal range of motion.   Lymphadenopathy:      Cervical: No cervical adenopathy.   Skin:     General: Skin is warm.   Neurological:      Mental Status: She is alert and oriented to person, place, and time. "           Health Maintenance       Date Due Completion Date    Hepatitis C Screening 1940 ---    Shingles Vaccine (2 of 3) 12/08/2012 10/13/2012    DEXA SCAN 03/24/2017 3/24/2014    Override on 1/7/2011: Done (osteoporosis)    Mammogram 06/12/2020 6/12/2019    Override on 1/28/2011: Done    Influenza Vaccine (1) 09/01/2020 9/27/2019    Lipid Panel 09/18/2020 9/18/2019          Assessment     Assessment/Plan:    Ivelisse Kern is a 80 y.o. female who presents today for :      Hypothyroidism, unspecified type  -     CBC auto differential; Future; Expected date: 08/07/2020  -     Comprehensive metabolic panel; Future; Expected date: 08/07/2020  -     Lipid Panel; Future; Expected date: 08/07/2020  -     TSH; Future; Expected date: 08/07/2020  -     PTH, intact; Future; Expected date: 08/07/2020  -     levothyroxine (SYNTHROID) 25 MCG tablet; TAKE 1 TABLET(25 MCG) BY MOUTH EVERY MORNING  Dispense: 90 tablet; Refill: 3  The current medical regimen is effective;  continue present plan and medications.    Secondary hyperparathyroidism  -     PTH, intact; Future; Expected date: 08/07/2020  Noted in chart  Continue to monitor     Hyperlipidemia, unspecified hyperlipidemia type  -     CBC auto differential; Future; Expected date: 08/07/2020  -     Comprehensive metabolic panel; Future; Expected date: 08/07/2020  -     Lipid Panel; Future; Expected date: 08/07/2020  -     TSH; Future; Expected date: 08/07/2020  -     PTH, intact; Future; Expected date: 08/07/2020  -     pravastatin (PRAVACHOL) 80 MG tablet; Take 1 tablet (80 mg total) by mouth once daily.  Dispense: 90 tablet; Refill: 3  The current medical regimen is effective;  continue present plan and medications.    CKD (chronic kidney disease) stage 3, GFR 30-59 ml/min  Continue to monitor     Chronic obstructive pulmonary disease, unspecified COPD type  The current medical regimen is effective;  continue present plan and medications.    Essential hypertension  -      metoprolol succinate (TOPROL-XL) 50 MG 24 hr tablet; Take one table daily  Dispense: 90 tablet; Refill: 3  -     amLODIPine (NORVASC) 10 MG tablet; Take 1 tablet (10 mg total) by mouth once daily.  Dispense: 90 tablet; Refill: 3  The current medical regimen is effective;  continue present plan and medications.      Gastroesophageal reflux disease, esophagitis presence not specified  -     omeprazole (PRILOSEC) 20 MG capsule; TAKE 1 CAPSULE(20 MG) BY MOUTH EVERY DAY  Dispense: 90 capsule; Refill: 3  The current medical regimen is effective;  continue present plan and medications.      Urinary incontinence, unspecified type  -     oxybutynin (DITROPAN-XL) 10 MG 24 hr tablet; Take 1 tablet (10 mg total) by mouth once daily.  Dispense: 90 tablet; Refill: 3  The current medical regimen is effective;  continue present plan and medications.    Hyperuricemia  -     allopurinoL (ZYLOPRIM) 100 MG tablet; Take 1 tablet (100 mg total) by mouth once daily.  Dispense: 90 tablet; Refill: 3  The current medical regimen is effective;  continue present plan and medications.    Allergic rhinitis, unspecified seasonality, unspecified trigger  -     montelukast (SINGULAIR) 10 mg tablet; TAKE 1 TABLET(10 MG) BY MOUTH EVERY EVENING  Dispense: 90 tablet; Refill: 3  The current medical regimen is effective;  continue present plan and medications.    History of breast cancer  -     Mammo Digital Diagnostic Bilateral; Future; Expected date: 08/07/2020    Encounter for hepatitis C screening test for low risk patient  -     Hepatitis C Antibody; Future; Expected date: 08/07/2020            Follow up in about 6 months (around 2/7/2021), or if symptoms worsen or fail to improve.

## 2020-09-02 ENCOUNTER — HOSPITAL ENCOUNTER (OUTPATIENT)
Dept: RADIOLOGY | Facility: HOSPITAL | Age: 80
Discharge: HOME | End: 2020-09-02
Attending: FAMILY MEDICINE
Payer: MEDICARE

## 2020-09-02 VITALS — WEIGHT: 175 LBS | HEIGHT: 63 IN | BODY MASS INDEX: 31.01 KG/M2

## 2020-09-02 DIAGNOSIS — Z85.3 HISTORY OF BREAST CANCER: ICD-10-CM

## 2020-09-02 PROCEDURE — 77062 BREAST TOMOSYNTHESIS BI: CPT | Mod: TC

## 2020-09-02 PROCEDURE — 77066 DX MAMMO INCL CAD BI: CPT | Mod: 26,,, | Performed by: RADIOLOGY

## 2020-09-02 PROCEDURE — 77062 MAMMO DIGITAL DIAGNOSTIC BILAT WITH TOMOSYNTHESIS_CAD: ICD-10-PCS | Mod: 26,,, | Performed by: RADIOLOGY

## 2020-09-02 PROCEDURE — 77066 MAMMO DIGITAL DIAGNOSTIC BILAT WITH TOMOSYNTHESIS_CAD: ICD-10-PCS | Mod: 26,,, | Performed by: RADIOLOGY

## 2020-09-02 PROCEDURE — 77062 BREAST TOMOSYNTHESIS BI: CPT | Mod: 26,,, | Performed by: RADIOLOGY

## 2020-10-07 ENCOUNTER — TELEPHONE (OUTPATIENT)
Dept: FAMILY MEDICINE | Facility: CLINIC | Age: 80
End: 2020-10-07

## 2020-10-07 DIAGNOSIS — R01.1 CARDIAC MURMUR: Primary | ICD-10-CM

## 2020-10-07 NOTE — TELEPHONE ENCOUNTER
----- Message from Esperanza Hagan sent at 10/7/2020 12:08 PM CDT -----  Regarding: Patient Advice  Name of Who is Calling: Lucia with [employer]      What is the request in detail:  Lucia with [employer] called to give report. She states at the above patient's Wellness Visit with [facility] [employer] Nurse it was detected a heart murmur. Please give a call and further advise.    Reply by MY ROMESCOLLIN: no      Call Back: [phone]

## 2020-10-07 NOTE — TELEPHONE ENCOUNTER
Voicemail message left for patient to call this office regarding message below. Cardiology referral placed.

## 2020-10-10 ENCOUNTER — PATIENT OUTREACH (OUTPATIENT)
Dept: ADMINISTRATIVE | Facility: OTHER | Age: 80
End: 2020-10-10

## 2020-10-12 ENCOUNTER — OFFICE VISIT (OUTPATIENT)
Dept: CARDIOLOGY | Facility: CLINIC | Age: 80
End: 2020-10-12
Payer: MEDICARE

## 2020-10-12 VITALS
SYSTOLIC BLOOD PRESSURE: 127 MMHG | HEART RATE: 57 BPM | OXYGEN SATURATION: 97 % | HEIGHT: 63 IN | BODY MASS INDEX: 29.59 KG/M2 | WEIGHT: 167 LBS | DIASTOLIC BLOOD PRESSURE: 73 MMHG | RESPIRATION RATE: 16 BRPM

## 2020-10-12 DIAGNOSIS — I10 ESSENTIAL HYPERTENSION: Primary | ICD-10-CM

## 2020-10-12 DIAGNOSIS — R01.1 CARDIAC MURMUR: ICD-10-CM

## 2020-10-12 DIAGNOSIS — I10 HYPERTENSION: ICD-10-CM

## 2020-10-12 DIAGNOSIS — N18.30 STAGE 3 CHRONIC KIDNEY DISEASE, UNSPECIFIED WHETHER STAGE 3A OR 3B CKD: ICD-10-CM

## 2020-10-12 DIAGNOSIS — F41.9 ANXIETY: ICD-10-CM

## 2020-10-12 DIAGNOSIS — E78.5 HYPERLIPIDEMIA, UNSPECIFIED HYPERLIPIDEMIA TYPE: ICD-10-CM

## 2020-10-12 PROCEDURE — 1159F MED LIST DOCD IN RCRD: CPT | Mod: S$GLB,,, | Performed by: INTERNAL MEDICINE

## 2020-10-12 PROCEDURE — 3078F PR MOST RECENT DIASTOLIC BLOOD PRESSURE < 80 MM HG: ICD-10-PCS | Mod: CPTII,S$GLB,, | Performed by: INTERNAL MEDICINE

## 2020-10-12 PROCEDURE — 99499 UNLISTED E&M SERVICE: CPT | Mod: S$GLB,,, | Performed by: INTERNAL MEDICINE

## 2020-10-12 PROCEDURE — 1101F PR PT FALLS ASSESS DOC 0-1 FALLS W/OUT INJ PAST YR: ICD-10-PCS | Mod: CPTII,S$GLB,, | Performed by: INTERNAL MEDICINE

## 2020-10-12 PROCEDURE — 93000 EKG 12-LEAD: ICD-10-PCS | Mod: S$GLB,,, | Performed by: INTERNAL MEDICINE

## 2020-10-12 PROCEDURE — 99999 PR PBB SHADOW E&M-EST. PATIENT-LVL V: ICD-10-PCS | Mod: PBBFAC,,, | Performed by: INTERNAL MEDICINE

## 2020-10-12 PROCEDURE — 3074F SYST BP LT 130 MM HG: CPT | Mod: CPTII,S$GLB,, | Performed by: INTERNAL MEDICINE

## 2020-10-12 PROCEDURE — 1126F PR PAIN SEVERITY QUANTIFIED, NO PAIN PRESENT: ICD-10-PCS | Mod: S$GLB,,, | Performed by: INTERNAL MEDICINE

## 2020-10-12 PROCEDURE — 1101F PT FALLS ASSESS-DOCD LE1/YR: CPT | Mod: CPTII,S$GLB,, | Performed by: INTERNAL MEDICINE

## 2020-10-12 PROCEDURE — 1159F PR MEDICATION LIST DOCUMENTED IN MEDICAL RECORD: ICD-10-PCS | Mod: S$GLB,,, | Performed by: INTERNAL MEDICINE

## 2020-10-12 PROCEDURE — 3078F DIAST BP <80 MM HG: CPT | Mod: CPTII,S$GLB,, | Performed by: INTERNAL MEDICINE

## 2020-10-12 PROCEDURE — 99204 PR OFFICE/OUTPT VISIT, NEW, LEVL IV, 45-59 MIN: ICD-10-PCS | Mod: S$GLB,,, | Performed by: INTERNAL MEDICINE

## 2020-10-12 PROCEDURE — 99999 PR PBB SHADOW E&M-EST. PATIENT-LVL V: CPT | Mod: PBBFAC,,, | Performed by: INTERNAL MEDICINE

## 2020-10-12 PROCEDURE — 3074F PR MOST RECENT SYSTOLIC BLOOD PRESSURE < 130 MM HG: ICD-10-PCS | Mod: CPTII,S$GLB,, | Performed by: INTERNAL MEDICINE

## 2020-10-12 PROCEDURE — 1126F AMNT PAIN NOTED NONE PRSNT: CPT | Mod: S$GLB,,, | Performed by: INTERNAL MEDICINE

## 2020-10-12 PROCEDURE — 93000 ELECTROCARDIOGRAM COMPLETE: CPT | Mod: S$GLB,,, | Performed by: INTERNAL MEDICINE

## 2020-10-12 PROCEDURE — 99204 OFFICE O/P NEW MOD 45 MIN: CPT | Mod: S$GLB,,, | Performed by: INTERNAL MEDICINE

## 2020-10-12 PROCEDURE — 99499 RISK ADDL DX/OHS AUDIT: ICD-10-PCS | Mod: S$GLB,,, | Performed by: INTERNAL MEDICINE

## 2020-10-12 RX ORDER — ATORVASTATIN CALCIUM 40 MG/1
40 TABLET, FILM COATED ORAL NIGHTLY
Qty: 90 TABLET | Refills: 3 | Status: SHIPPED | OUTPATIENT
Start: 2020-10-12 | End: 2021-08-09 | Stop reason: SDUPTHER

## 2020-10-12 RX ORDER — ATORVASTATIN CALCIUM 40 MG/1
40 TABLET, FILM COATED ORAL NIGHTLY
Qty: 90 TABLET | Refills: 3 | Status: SHIPPED | OUTPATIENT
Start: 2020-10-12 | End: 2020-10-12

## 2020-10-12 NOTE — PROGRESS NOTES
CARDIOVASCULAR CONSULTATION    REASON FOR CONSULT:   Ivelisse Kern is a 80 y.o. female who presents for evaluation of heart murmur      HISTORY OF PRESENT ILLNESS:     Patient is a pleasant 80-year-old lady has been referred to me for evaluation of murmur.  Patient denies any chest pains at rest on exertion, orthopnea, PND.  Does not walk much because of significant arthritis.  Denies any syncopal episodes.  Denies any swelling of feet.  Did have history of breast cancer and court radiation therapy for that 5 years ago.  Currently in remission as per the patient.    EKG performed in the clinic today was personally evaluated and showed normal sinus rhythm, inferior infarction, poor R-wave progression    PAST MEDICAL HISTORY:     Past Medical History:   Diagnosis Date    AR (allergic rhinitis)     Arthritis     Asthma     Breast cancer     COPD (chronic obstructive pulmonary disease)     GERD (gastroesophageal reflux disease)     Hyperlipidemia     Hypertension     Hypothyroidism     Osteoporosis        PAST SURGICAL HISTORY:     Past Surgical History:   Procedure Laterality Date    APPENDECTOMY      BREAST BIOPSY Right 4/15/2014    BREAST LUMPECTOMY      OVARIAN CYST REMOVAL      TONSILLECTOMY         ALLERGIES AND MEDICATION:     Review of patient's allergies indicates:   Allergen Reactions    Codeine Other (See Comments)     Dizziness        Medication List          Accurate as of October 12, 2020 11:42 AM. If you have any questions, ask your nurse or doctor.            START taking these medications    atorvastatin 40 MG tablet  Commonly known as: LIPITOR  Take 1 tablet (40 mg total) by mouth every evening.  Started by: Marianna Zhu MD        CONTINUE taking these medications    albuterol 90 mcg/actuation inhaler  Commonly known as: PROAIR HFA  Inhale 1 puff into the lungs every 6 (six) hours as needed for Wheezing.     allopurinoL 100 MG tablet  Commonly known as: ZYLOPRIM  Take 1 tablet (100  mg total) by mouth once daily.     amLODIPine 10 MG tablet  Commonly known as: NORVASC  TAKE 1 TABLET BY MOUTH ONCE DAILY     anastrozole 1 mg Tab  Commonly known as: ARIMIDEX  Take 1 tablet (1 mg total) by mouth once daily.     calcium carbonate 500 mg calcium (1,250 mg) tablet  Commonly known as: OS-AMAURY  Take 2 tablets (1,000 mg total) by mouth once daily.     cholecalciferol (vitamin D3) 10 mcg (400 unit) Cap  Take 2 capsules (800 Units total) by mouth once daily.     fluticasone propionate 50 mcg/actuation nasal spray  Commonly known as: FLONASE  1 spray (50 mcg total) by Each Nare route once daily.     levothyroxine 25 MCG tablet  Commonly known as: SYNTHROID  TAKE 1 TABLET BY MOUTH  EVERY MORNING     metoprolol succinate 50 MG 24 hr tablet  Commonly known as: TOPROL-XL  TAKE 1 TABLET BY MOUTH  DAILY     montelukast 10 mg tablet  Commonly known as: SINGULAIR  TAKE 1 TABLET BY MOUTH  EVERY EVENING     omeprazole 20 MG capsule  Commonly known as: PRILOSEC  TAKE 1 CAPSULE BY MOUTH  EVERY DAY     oxybutynin 10 MG 24 hr tablet  Commonly known as: DITROPAN-XL  TAKE 1 TABLET BY MOUTH ONCE DAILY     traMADoL 50 mg tablet  Commonly known as: ULTRAM  TAKE 1 TABLET BY MOUTH EVERY 8 HOURS AS NEEDED        STOP taking these medications    pravastatin 80 MG tablet  Commonly known as: PRAVACHOL  Stopped by: Marianna Zhu MD           Where to Get Your Medications      These medications were sent to [facility] - [address], [facility] [address]    Phone: [phone]   · atorvastatin 40 MG tablet         SOCIAL HISTORY:     Social History     Socioeconomic History    Marital status: Single     Spouse name: Not on file    Number of children: Not on file    Years of education: Not on file    Highest education level: Not on file   Occupational History    Not on file   Social Needs    Financial resource strain: Not on file    Food insecurity     Worry:  "Not on file     Inability: Not on file    Transportation needs     Medical: Not on file     Non-medical: Not on file   Tobacco Use    Smoking status: Never Smoker    Smokeless tobacco: Never Used   Substance and Sexual Activity    Alcohol use: No    Drug use: No    Sexual activity: Not Currently   Lifestyle    Physical activity     Days per week: Not on file     Minutes per session: Not on file    Stress: Only a little   Relationships    Social connections     Talks on phone: Not on file     Gets together: Not on file     Attends Sikhism service: Not on file     Active member of club or organization: Not on file     Attends meetings of clubs or organizations: Not on file     Relationship status: Not on file   Other Topics Concern    Not on file   Social History Narrative    Not on file       FAMILY HISTORY:     Family History   Problem Relation Age of Onset    Cancer Brother         lung       REVIEW OF SYSTEMS:   Review of Systems   Constitution: Negative.   HENT: Negative.    Eyes: Negative.    Cardiovascular: Negative.    Respiratory: Negative.    Endocrine: Negative.    Hematologic/Lymphatic: Negative.    Skin: Negative.    Musculoskeletal: Positive for arthritis.   Gastrointestinal: Negative.    Genitourinary: Negative.    Neurological: Negative.    Psychiatric/Behavioral: Negative.    Allergic/Immunologic: Negative.        A 10 point review of systems was performed and all the pertinent positives have been mentioned. Rest of review of systems was negative.        PHYSICAL EXAM:     Vitals:    10/12/20 1101   BP: 127/73   Pulse: (!) 57   Resp: 16    Body mass index is 30.06 kg/m².  Weight: 75.8 kg (167 lb)   Height: 5' 2.5" (158.8 cm)     Physical Exam   Constitutional: She is oriented to person, place, and time. She appears well-developed and well-nourished.   HENT:   Head: Normocephalic.   Eyes: Pupils are equal, round, and reactive to light.   Neck: Normal range of motion. Neck supple. "   Cardiovascular: Normal rate and regular rhythm.   Murmur heard.  Pulmonary/Chest: Effort normal and breath sounds normal.   Abdominal: Soft. Normal appearance and bowel sounds are normal. There is no abdominal tenderness.   Musculoskeletal: Normal range of motion.   Neurological: She is alert and oriented to person, place, and time.   Skin: Skin is warm.   Psychiatric: She has a normal mood and affect.         DATA:     Laboratory:  CBC:  Recent Labs   Lab 10/23/18  1425 09/18/19  0800   WBC 11.06 8.52   Hemoglobin 12.5 12.0   Hematocrit 39.4 39.0   Platelets 488 H 424 H       CHEMISTRIES:  Recent Labs   Lab 10/23/18  1425 09/18/19  0800   Glucose 104 94   Sodium 140 140   Potassium 4.5 4.4   BUN, Bld 25 H 21   Creatinine 0.9 0.9   eGFR if African American >60.0 >60.0   eGFR if non African American >60.0 >60.0   Calcium 10.8 H 9.7       CARDIAC BIOMARKERS:        COAGS:        LIPIDS/LFTS:  Recent Labs   Lab 10/23/18  1425 09/18/19  0800   Cholesterol 239 H 240 H   Triglycerides 171 H 157 H   HDL 45 48   LDL Cholesterol 159.8 H 160.6 H   Non-HDL Cholesterol 194 192   AST 14 12   ALT 7 L 6 L       Hemoglobin A1C   Date Value Ref Range Status   10/23/2018 5.2 4.0 - 5.6 % Final     Comment:     ADA Screening Guidelines:  5.7-6.4%  Consistent with prediabetes  >or=6.5%  Consistent with diabetes  High levels of fetal hemoglobin interfere with the HbA1C  assay. Heterozygous hemoglobin variants (HbS, HgC, etc)do  not significantly interfere with this assay.   However, presence of multiple variants may affect accuracy.     05/07/2012 5.7 4.0 - 6.2 % Final       TSH  Recent Labs   Lab 10/23/18  1425 09/18/19  0800   TSH 2.074 1.967       The 10-year ASCVD risk score (Jeremyelvin DOWNS Jr., et al., 2013) is: 29.8%    Values used to calculate the score:      Age: 80 years      Sex: Female      Is Non- : No      Diabetic: No      Tobacco smoker: No      Systolic Blood Pressure: 127 mmHg      Is BP treated: Yes       HDL Cholesterol: 48 mg/dL      Total Cholesterol: 240 mg/dL             ASSESSMENT AND PLAN     Patient Active Problem List   Diagnosis    COPD (chronic obstructive pulmonary disease)    Essential hypertension    Hyperlipidemia    Urge and stress incontinence    Leg pain    GERD (gastroesophageal reflux disease)    Hypothyroidism    Anxiety    AR (allergic rhinitis)    CKD (chronic kidney disease) stage 3, GFR 30-59 ml/min    Vitamin D deficiency disease    Osteopenia    Visit for monitoring Arimidex therapy    Hot flashes related to aromatase inhibitor therapy    Radiation dermatitis    Myalgia    Hyperuricemia    Bilateral cataracts    Secondary hyperparathyroidism    History of breast cancer       1.  Heart murmur:  Check 2D echocardiogram    2.  Dyslipidemia:   Switch pravastatin to Lipitor 40 mg daily.    Follow-up after testing        Thank you very much for involving me in the care of your patient.  Please do not hesitate to contact me if there are any questions.      Marianna Zhu MD, FACC, [facility]  Interventional Cardiologist, [facility].           This note was dictated with the help of speech recognition software.  There might be un-intended errors and/or substitutions.

## 2020-10-20 ENCOUNTER — HOSPITAL ENCOUNTER (OUTPATIENT)
Dept: CARDIOLOGY | Facility: HOSPITAL | Age: 80
Discharge: HOME | End: 2020-10-20
Attending: INTERNAL MEDICINE
Payer: MEDICARE

## 2020-10-20 LAB
ASCENDING AORTA: 2.78 CM
AV INDEX (PROSTH): 0.53
AV MEAN GRADIENT: 10 MMHG
AV PEAK GRADIENT: 17 MMHG
AV VALVE AREA: 1.7 CM2
AV VELOCITY RATIO: 0.5
CV ECHO LV RWT: 0.37 CM
DOP CALC AO PEAK VEL: 2.08 M/S
DOP CALC AO VTI: 51.23 CM
DOP CALC LVOT AREA: 3.2 CM2
DOP CALC LVOT DIAMETER: 2.02 CM
DOP CALC LVOT PEAK VEL: 1.03 M/S
DOP CALC LVOT STROKE VOLUME: 87.32 CM3
DOP CALCLVOT PEAK VEL VTI: 27.26 CM
E WAVE DECELERATION TIME: 263.24 MSEC
E/A RATIO: 0.76
E/E' RATIO: 16.8 M/S
ECHO LV POSTERIOR WALL: 0.9 CM (ref 0.6–1.1)
FRACTIONAL SHORTENING: 40 % (ref 28–44)
INTERVENTRICULAR SEPTUM: 0.85 CM (ref 0.6–1.1)
IVRT: 96.89 MSEC
LA MAJOR: 5.73 CM
LA MINOR: 5.72 CM
LA WIDTH: 3.95 CM
LEFT ATRIUM SIZE: 3.99 CM
LEFT ATRIUM VOLUME: 76.69 CM3
LEFT INTERNAL DIMENSION IN SYSTOLE: 2.91 CM (ref 2.1–4)
LEFT VENTRICLE DIASTOLIC VOLUME: 110.44 ML
LEFT VENTRICLE SYSTOLIC VOLUME: 32.61 ML
LEFT VENTRICULAR INTERNAL DIMENSION IN DIASTOLE: 4.86 CM (ref 3.5–6)
LEFT VENTRICULAR MASS: 145.38 G
LV LATERAL E/E' RATIO: 16.8 M/S
LV SEPTAL E/E' RATIO: 16.8 M/S
MV PEAK A VEL: 1.1 M/S
MV PEAK E VEL: 0.84 M/S
MV STENOSIS PRESSURE HALF TIME: 95.25 MS
MV VALVE AREA P 1/2 METHOD: 2.31 CM2
PISA TR MAX VEL: 2.91 M/S
PULM VEIN S/D RATIO: 1.24
PV PEAK D VEL: 0.5 M/S
PV PEAK S VEL: 0.62 M/S
PV PEAK VELOCITY: 1.06 CM/S
RA MAJOR: 5.96 CM
RA PRESSURE: 3 MMHG
RA WIDTH: 3.49 CM
RIGHT VENTRICULAR END-DIASTOLIC DIMENSION: 3.69 CM
RV TISSUE DOPPLER FREE WALL SYSTOLIC VELOCITY 1 (APICAL 4 CHAMBER VIEW): 18.56 CM/S
SINUS: 2.76 CM
STJ: 2.18 CM
TDI LATERAL: 0.05 M/S
TDI SEPTAL: 0.05 M/S
TDI: 0.05 M/S
TR MAX PG: 34 MMHG
TRICUSPID ANNULAR PLANE SYSTOLIC EXCURSION: 2.46 CM
TV REST PULMONARY ARTERY PRESSURE: 37 MMHG

## 2020-10-20 PROCEDURE — 93306 TTE W/DOPPLER COMPLETE: CPT

## 2020-10-20 PROCEDURE — 93306 TTE W/DOPPLER COMPLETE: CPT | Mod: 26,,, | Performed by: INTERNAL MEDICINE

## 2020-10-20 PROCEDURE — 93306 ECHO (CUPID ONLY): ICD-10-PCS | Mod: 26,,, | Performed by: INTERNAL MEDICINE

## 2020-11-25 ENCOUNTER — TELEPHONE (OUTPATIENT)
Dept: CARDIOLOGY | Facility: CLINIC | Age: 80
End: 2020-11-25

## 2020-11-25 NOTE — TELEPHONE ENCOUNTER
Anthony Zhu Mrs. Marylee wants to know if you can give her results over the phone or do you need her to come in

## 2021-01-26 ENCOUNTER — PATIENT OUTREACH (OUTPATIENT)
Dept: ADMINISTRATIVE | Facility: HOSPITAL | Age: 81
End: 2021-01-26

## 2021-01-26 DIAGNOSIS — N95.8 OTHER SPECIFIED MENOPAUSAL AND PERIMENOPAUSAL DISORDERS: Primary | ICD-10-CM

## 2021-02-04 ENCOUNTER — LAB VISIT (OUTPATIENT)
Dept: LAB | Facility: HOSPITAL | Age: 81
End: 2021-02-04
Attending: FAMILY MEDICINE
Payer: MEDICARE

## 2021-02-04 DIAGNOSIS — N25.81 SECONDARY HYPERPARATHYROIDISM: ICD-10-CM

## 2021-02-04 DIAGNOSIS — Z11.59 ENCOUNTER FOR HEPATITIS C SCREENING TEST FOR LOW RISK PATIENT: ICD-10-CM

## 2021-02-04 DIAGNOSIS — E78.5 HYPERLIPIDEMIA, UNSPECIFIED HYPERLIPIDEMIA TYPE: ICD-10-CM

## 2021-02-04 DIAGNOSIS — E03.9 HYPOTHYROIDISM, UNSPECIFIED TYPE: ICD-10-CM

## 2021-02-04 LAB
BASOPHILS # BLD AUTO: 0.08 K/UL (ref 0–0.2)
BASOPHILS NFR BLD: 1 % (ref 0–1.9)
DIFFERENTIAL METHOD: ABNORMAL
EOSINOPHIL # BLD AUTO: 0.4 K/UL (ref 0–0.5)
EOSINOPHIL NFR BLD: 5.4 % (ref 0–8)
ERYTHROCYTE [DISTWIDTH] IN BLOOD BY AUTOMATED COUNT: 13.4 % (ref 11.5–14.5)
HCT VFR BLD AUTO: 36.5 % (ref 37–48.5)
HCV AB SERPL QL IA: NEGATIVE
HGB BLD-MCNC: 11.4 G/DL (ref 12–16)
IMM GRANULOCYTES # BLD AUTO: 0.03 K/UL (ref 0–0.04)
IMM GRANULOCYTES NFR BLD AUTO: 0.4 % (ref 0–0.5)
LYMPHOCYTES # BLD AUTO: 1.7 K/UL (ref 1–4.8)
LYMPHOCYTES NFR BLD: 21.2 % (ref 18–48)
MCH RBC QN AUTO: 28.9 PG (ref 27–31)
MCHC RBC AUTO-ENTMCNC: 31.2 G/DL (ref 32–36)
MCV RBC AUTO: 93 FL (ref 82–98)
MONOCYTES # BLD AUTO: 0.6 K/UL (ref 0.3–1)
MONOCYTES NFR BLD: 7.3 % (ref 4–15)
NEUTROPHILS # BLD AUTO: 5.2 K/UL (ref 1.8–7.7)
NEUTROPHILS NFR BLD: 64.7 % (ref 38–73)
NRBC BLD-RTO: 0 /100 WBC
PLATELET # BLD AUTO: 435 K/UL (ref 150–350)
PMV BLD AUTO: 9.6 FL (ref 9.2–12.9)
RBC # BLD AUTO: 3.94 M/UL (ref 4–5.4)
WBC # BLD AUTO: 7.99 K/UL (ref 3.9–12.7)

## 2021-02-04 PROCEDURE — 80053 COMPREHEN METABOLIC PANEL: CPT

## 2021-02-04 PROCEDURE — 36415 COLL VENOUS BLD VENIPUNCTURE: CPT | Mod: PO

## 2021-02-04 PROCEDURE — 84443 ASSAY THYROID STIM HORMONE: CPT

## 2021-02-04 PROCEDURE — 80061 LIPID PANEL: CPT

## 2021-02-04 PROCEDURE — 85025 COMPLETE CBC W/AUTO DIFF WBC: CPT

## 2021-02-04 PROCEDURE — 83970 ASSAY OF PARATHORMONE: CPT

## 2021-02-04 PROCEDURE — 86803 HEPATITIS C AB TEST: CPT

## 2021-02-05 LAB
ALBUMIN SERPL BCP-MCNC: 3.7 G/DL (ref 3.5–5.2)
ALP SERPL-CCNC: 116 U/L (ref 55–135)
ALT SERPL W/O P-5'-P-CCNC: 7 U/L (ref 10–44)
ANION GAP SERPL CALC-SCNC: 12 MMOL/L (ref 8–16)
AST SERPL-CCNC: 15 U/L (ref 10–40)
BILIRUB SERPL-MCNC: 0.5 MG/DL (ref 0.1–1)
BUN SERPL-MCNC: 20 MG/DL (ref 8–23)
CALCIUM SERPL-MCNC: 8.9 MG/DL (ref 8.7–10.5)
CHLORIDE SERPL-SCNC: 106 MMOL/L (ref 95–110)
CHOLEST SERPL-MCNC: 177 MG/DL (ref 120–199)
CHOLEST/HDLC SERPL: 4.7 {RATIO} (ref 2–5)
CO2 SERPL-SCNC: 22 MMOL/L (ref 23–29)
CREAT SERPL-MCNC: 0.8 MG/DL (ref 0.5–1.4)
EST. GFR  (AFRICAN AMERICAN): >60 ML/MIN/1.73 M^2
EST. GFR  (NON AFRICAN AMERICAN): >60 ML/MIN/1.73 M^2
GLUCOSE SERPL-MCNC: 92 MG/DL (ref 70–110)
HDLC SERPL-MCNC: 38 MG/DL (ref 40–75)
HDLC SERPL: 21.5 % (ref 20–50)
LDLC SERPL CALC-MCNC: 107.2 MG/DL (ref 63–159)
NONHDLC SERPL-MCNC: 139 MG/DL
POTASSIUM SERPL-SCNC: 4.2 MMOL/L (ref 3.5–5.1)
PROT SERPL-MCNC: 7 G/DL (ref 6–8.4)
PTH-INTACT SERPL-MCNC: 102 PG/ML (ref 9–77)
SODIUM SERPL-SCNC: 140 MMOL/L (ref 136–145)
TRIGL SERPL-MCNC: 159 MG/DL (ref 30–150)
TSH SERPL DL<=0.005 MIU/L-ACNC: 1.98 UIU/ML (ref 0.4–4)

## 2021-02-08 ENCOUNTER — OFFICE VISIT (OUTPATIENT)
Dept: FAMILY MEDICINE | Facility: CLINIC | Age: 81
End: 2021-02-08
Payer: MEDICARE

## 2021-02-08 VITALS
WEIGHT: 170 LBS | TEMPERATURE: 97 F | HEIGHT: 63 IN | OXYGEN SATURATION: 97 % | DIASTOLIC BLOOD PRESSURE: 72 MMHG | SYSTOLIC BLOOD PRESSURE: 126 MMHG | HEART RATE: 71 BPM | BODY MASS INDEX: 30.12 KG/M2

## 2021-02-08 DIAGNOSIS — N18.31 STAGE 3A CHRONIC KIDNEY DISEASE: ICD-10-CM

## 2021-02-08 DIAGNOSIS — N18.30 STAGE 3 CHRONIC KIDNEY DISEASE, UNSPECIFIED WHETHER STAGE 3A OR 3B CKD: ICD-10-CM

## 2021-02-08 DIAGNOSIS — N25.81 SECONDARY HYPERPARATHYROIDISM: Primary | ICD-10-CM

## 2021-02-08 DIAGNOSIS — E03.9 HYPOTHYROIDISM, UNSPECIFIED TYPE: ICD-10-CM

## 2021-02-08 DIAGNOSIS — E78.5 HYPERLIPIDEMIA, UNSPECIFIED HYPERLIPIDEMIA TYPE: ICD-10-CM

## 2021-02-08 DIAGNOSIS — J44.9 CHRONIC OBSTRUCTIVE PULMONARY DISEASE, UNSPECIFIED COPD TYPE: ICD-10-CM

## 2021-02-08 DIAGNOSIS — I10 ESSENTIAL HYPERTENSION: ICD-10-CM

## 2021-02-08 PROCEDURE — 3288F FALL RISK ASSESSMENT DOCD: CPT | Mod: CPTII,S$GLB,, | Performed by: FAMILY MEDICINE

## 2021-02-08 PROCEDURE — 3078F DIAST BP <80 MM HG: CPT | Mod: CPTII,S$GLB,, | Performed by: FAMILY MEDICINE

## 2021-02-08 PROCEDURE — 3288F PR FALLS RISK ASSESSMENT DOCUMENTED: ICD-10-PCS | Mod: CPTII,S$GLB,, | Performed by: FAMILY MEDICINE

## 2021-02-08 PROCEDURE — 3074F PR MOST RECENT SYSTOLIC BLOOD PRESSURE < 130 MM HG: ICD-10-PCS | Mod: CPTII,S$GLB,, | Performed by: FAMILY MEDICINE

## 2021-02-08 PROCEDURE — 99999 PR PBB SHADOW E&M-EST. PATIENT-LVL IV: CPT | Mod: PBBFAC,,, | Performed by: FAMILY MEDICINE

## 2021-02-08 PROCEDURE — 1126F PR PAIN SEVERITY QUANTIFIED, NO PAIN PRESENT: ICD-10-PCS | Mod: S$GLB,,, | Performed by: FAMILY MEDICINE

## 2021-02-08 PROCEDURE — 99999 PR PBB SHADOW E&M-EST. PATIENT-LVL IV: ICD-10-PCS | Mod: PBBFAC,,, | Performed by: FAMILY MEDICINE

## 2021-02-08 PROCEDURE — 3078F PR MOST RECENT DIASTOLIC BLOOD PRESSURE < 80 MM HG: ICD-10-PCS | Mod: CPTII,S$GLB,, | Performed by: FAMILY MEDICINE

## 2021-02-08 PROCEDURE — 99499 RISK ADDL DX/OHS AUDIT: ICD-10-PCS | Mod: S$GLB,,, | Performed by: FAMILY MEDICINE

## 2021-02-08 PROCEDURE — 1101F PR PT FALLS ASSESS DOC 0-1 FALLS W/OUT INJ PAST YR: ICD-10-PCS | Mod: CPTII,S$GLB,, | Performed by: FAMILY MEDICINE

## 2021-02-08 PROCEDURE — 99214 OFFICE O/P EST MOD 30 MIN: CPT | Mod: S$GLB,,, | Performed by: FAMILY MEDICINE

## 2021-02-08 PROCEDURE — 99499 UNLISTED E&M SERVICE: CPT | Mod: S$GLB,,, | Performed by: FAMILY MEDICINE

## 2021-02-08 PROCEDURE — 3074F SYST BP LT 130 MM HG: CPT | Mod: CPTII,S$GLB,, | Performed by: FAMILY MEDICINE

## 2021-02-08 PROCEDURE — 1101F PT FALLS ASSESS-DOCD LE1/YR: CPT | Mod: CPTII,S$GLB,, | Performed by: FAMILY MEDICINE

## 2021-02-08 PROCEDURE — 99214 PR OFFICE/OUTPT VISIT, EST, LEVL IV, 30-39 MIN: ICD-10-PCS | Mod: S$GLB,,, | Performed by: FAMILY MEDICINE

## 2021-02-08 PROCEDURE — 1126F AMNT PAIN NOTED NONE PRSNT: CPT | Mod: S$GLB,,, | Performed by: FAMILY MEDICINE

## 2021-03-18 NOTE — TELEPHONE ENCOUNTER
----- Message from Ivelisse Ferrari sent at 6/3/2020  9:08 AM CDT -----  Type: Patient Call Back    Who called: pt     What is the request in detail: pt asking to speak with nurse. Declined to leave reason for the call.     Can the clinic reply by [facility]? No     Would the patient rather a call back or a response via [facility]? Call back     Best call back number: [phone]    Additional Information:       Patient Education     Puncture Wound: Foot       A puncture wound occurs when a pointed object (such as a nail) pushes into the skin. It may go into the tissues below the skin of the foot, including fat and muscle. This type of wound is narrow and deep. They can be hard to clean. Puncture wounds are at high risk for becoming infected. One type of serious infection is more likely if you were wearing a rubber-soled shoe at the time of injury. Bacteria from the sole of the shoe may be dragged into the wound. Symptoms of infection may appear as late as 2 to 3 weeks after the injury. Be sure to watch for symptoms of infection and call your healthcare provider right away if any them appear.  X-rays may be done to see whether any objects remain under the skin. Your may also need a tetanus shot. This is given if you are not up-to-date on this vaccination and the object that caused the wound may lead to tetanus.  Puncture wounds can easily become infected.   Home care    When you sit or lie down, raise the foot above the level of your heart. This helps reduce swelling and pain.    Don't put weight on the injured foot if it hurts to do so or if you were told to keep weight off the injury.    Your healthcare provider may prescribe an antibiotic. This is to help prevent infection. Follow all instructions for taking this medicine. Take the medicine every day until it is gone or you are told to stop. You should not have any left over.    The healthcare provider may prescribe medicines for pain. Follow instructions for taking them.    You can take acetaminophen or ibuprofen for pain, unless you were given a different pain medicine to use.     Follow the healthcare provider s instructions on how to care for the wound.    Keep the wound clean and dry. Don't get the wound wet until you are told it is OK to do so. If the area gets wet, gently pat it dry with a clean cloth. Replace the wet bandage with a dry one.    If a bandage was  applied and it becomes wet or dirty, replace it. Otherwise, leave it in place for the first 24 hours.    Once you can get the wound wet, you may shower as usual but don't soak the wound in water (no tub baths or swimming)    Check the wound daily for symptoms of infection. These include:  ? Increasing redness or swelling around the wound  ? Increased warmth of the wound  ? Worsening pain  ? Red streaking lines away from the wound  ? Draining pus  Follow-up care  Follow up with your healthcare provider, or as advised.   When to seek medical advice  Call your healthcare provider right away if any of these occur:    Any symptoms of infection (listed above)    Fever of 100.4 F (38. C) or higher, or as directed by your healthcare provider    Wound changes colors    Numbness around the wound    Decreased movement around the injured area  Demetria last reviewed this educational content on 8/1/2018 2000-2020 [employer]. All rights reserved. This information is not intended as a substitute for professional medical care. Always follow your healthcare professional's instructions.

## 2021-04-15 DIAGNOSIS — K21.9 GASTROESOPHAGEAL REFLUX DISEASE: ICD-10-CM

## 2021-04-15 RX ORDER — OMEPRAZOLE 20 MG/1
CAPSULE, DELAYED RELEASE ORAL
Qty: 90 CAPSULE | Refills: 3 | Status: SHIPPED | OUTPATIENT
Start: 2021-04-15 | End: 2021-05-25 | Stop reason: SDUPTHER

## 2021-05-25 DIAGNOSIS — K21.9 GASTROESOPHAGEAL REFLUX DISEASE: ICD-10-CM

## 2021-05-25 RX ORDER — OMEPRAZOLE 20 MG/1
CAPSULE, DELAYED RELEASE ORAL
Qty: 90 CAPSULE | Refills: 3 | Status: SHIPPED | OUTPATIENT
Start: 2021-05-25 | End: 2021-08-09 | Stop reason: SDUPTHER

## 2021-08-04 ENCOUNTER — LAB VISIT (OUTPATIENT)
Dept: LAB | Facility: HOSPITAL | Age: 81
End: 2021-08-04
Attending: FAMILY MEDICINE
Payer: MEDICARE

## 2021-08-04 DIAGNOSIS — N25.81 SECONDARY HYPERPARATHYROIDISM: ICD-10-CM

## 2021-08-04 LAB
ALBUMIN SERPL BCP-MCNC: 3.4 G/DL (ref 3.5–5.2)
ALP SERPL-CCNC: 117 U/L (ref 55–135)
ALT SERPL W/O P-5'-P-CCNC: 6 U/L (ref 10–44)
ANION GAP SERPL CALC-SCNC: 11 MMOL/L (ref 8–16)
AST SERPL-CCNC: 13 U/L (ref 10–40)
BASOPHILS # BLD AUTO: 0.06 K/UL (ref 0–0.2)
BASOPHILS NFR BLD: 0.8 % (ref 0–1.9)
BILIRUB SERPL-MCNC: 0.4 MG/DL (ref 0.1–1)
BUN SERPL-MCNC: 13 MG/DL (ref 8–23)
CALCIUM SERPL-MCNC: 9.4 MG/DL (ref 8.7–10.5)
CHLORIDE SERPL-SCNC: 108 MMOL/L (ref 95–110)
CO2 SERPL-SCNC: 21 MMOL/L (ref 23–29)
CREAT SERPL-MCNC: 0.8 MG/DL (ref 0.5–1.4)
DIFFERENTIAL METHOD: ABNORMAL
EOSINOPHIL # BLD AUTO: 0.3 K/UL (ref 0–0.5)
EOSINOPHIL NFR BLD: 4.1 % (ref 0–8)
ERYTHROCYTE [DISTWIDTH] IN BLOOD BY AUTOMATED COUNT: 13.5 % (ref 11.5–14.5)
EST. GFR  (AFRICAN AMERICAN): >60 ML/MIN/1.73 M^2
EST. GFR  (NON AFRICAN AMERICAN): >60 ML/MIN/1.73 M^2
GLUCOSE SERPL-MCNC: 97 MG/DL (ref 70–110)
HCT VFR BLD AUTO: 31.7 % (ref 37–48.5)
HGB BLD-MCNC: 10 G/DL (ref 12–16)
IMM GRANULOCYTES # BLD AUTO: 0.03 K/UL (ref 0–0.04)
IMM GRANULOCYTES NFR BLD AUTO: 0.4 % (ref 0–0.5)
LYMPHOCYTES # BLD AUTO: 1.2 K/UL (ref 1–4.8)
LYMPHOCYTES NFR BLD: 16.3 % (ref 18–48)
MCH RBC QN AUTO: 27.8 PG (ref 27–31)
MCHC RBC AUTO-ENTMCNC: 31.5 G/DL (ref 32–36)
MCV RBC AUTO: 88 FL (ref 82–98)
MONOCYTES # BLD AUTO: 0.6 K/UL (ref 0.3–1)
MONOCYTES NFR BLD: 8.4 % (ref 4–15)
NEUTROPHILS # BLD AUTO: 5.3 K/UL (ref 1.8–7.7)
NEUTROPHILS NFR BLD: 70 % (ref 38–73)
NRBC BLD-RTO: 0 /100 WBC
PLATELET # BLD AUTO: 491 K/UL (ref 150–450)
PMV BLD AUTO: 9.1 FL (ref 9.2–12.9)
POTASSIUM SERPL-SCNC: 4.6 MMOL/L (ref 3.5–5.1)
PROT SERPL-MCNC: 7.4 G/DL (ref 6–8.4)
PTH-INTACT SERPL-MCNC: 135.8 PG/ML (ref 9–77)
RBC # BLD AUTO: 3.6 M/UL (ref 4–5.4)
SODIUM SERPL-SCNC: 140 MMOL/L (ref 136–145)
WBC # BLD AUTO: 7.6 K/UL (ref 3.9–12.7)

## 2021-08-04 PROCEDURE — 36415 COLL VENOUS BLD VENIPUNCTURE: CPT | Mod: PO | Performed by: FAMILY MEDICINE

## 2021-08-04 PROCEDURE — 85025 COMPLETE CBC W/AUTO DIFF WBC: CPT | Performed by: FAMILY MEDICINE

## 2021-08-04 PROCEDURE — 80053 COMPREHEN METABOLIC PANEL: CPT | Performed by: FAMILY MEDICINE

## 2021-08-04 PROCEDURE — 83970 ASSAY OF PARATHORMONE: CPT | Performed by: FAMILY MEDICINE

## 2021-08-09 ENCOUNTER — OFFICE VISIT (OUTPATIENT)
Dept: FAMILY MEDICINE | Facility: CLINIC | Age: 81
End: 2021-08-09
Payer: MEDICARE

## 2021-08-09 VITALS
HEIGHT: 63 IN | TEMPERATURE: 98 F | DIASTOLIC BLOOD PRESSURE: 60 MMHG | WEIGHT: 170 LBS | BODY MASS INDEX: 30.12 KG/M2 | HEART RATE: 99 BPM | OXYGEN SATURATION: 96 % | SYSTOLIC BLOOD PRESSURE: 110 MMHG

## 2021-08-09 DIAGNOSIS — K21.00 GASTROESOPHAGEAL REFLUX DISEASE WITH ESOPHAGITIS, UNSPECIFIED WHETHER HEMORRHAGE: ICD-10-CM

## 2021-08-09 DIAGNOSIS — J30.9 ALLERGIC RHINITIS, UNSPECIFIED SEASONALITY, UNSPECIFIED TRIGGER: ICD-10-CM

## 2021-08-09 DIAGNOSIS — Z00.00 ANNUAL PHYSICAL EXAM: Primary | ICD-10-CM

## 2021-08-09 DIAGNOSIS — J44.9 CHRONIC OBSTRUCTIVE PULMONARY DISEASE, UNSPECIFIED COPD TYPE: ICD-10-CM

## 2021-08-09 DIAGNOSIS — E21.3 HYPERPARATHYROIDISM: ICD-10-CM

## 2021-08-09 DIAGNOSIS — I10 ESSENTIAL HYPERTENSION: Chronic | ICD-10-CM

## 2021-08-09 DIAGNOSIS — R32 URINARY INCONTINENCE, UNSPECIFIED TYPE: ICD-10-CM

## 2021-08-09 DIAGNOSIS — E78.5 HYPERLIPIDEMIA, UNSPECIFIED HYPERLIPIDEMIA TYPE: ICD-10-CM

## 2021-08-09 DIAGNOSIS — R60.1 GENERALIZED EDEMA: ICD-10-CM

## 2021-08-09 DIAGNOSIS — E03.9 HYPOTHYROIDISM, UNSPECIFIED TYPE: ICD-10-CM

## 2021-08-09 DIAGNOSIS — I89.0 LYMPHEDEMA: ICD-10-CM

## 2021-08-09 DIAGNOSIS — E79.0 HYPERURICEMIA: ICD-10-CM

## 2021-08-09 PROCEDURE — 3288F FALL RISK ASSESSMENT DOCD: CPT | Mod: CPTII,S$GLB,, | Performed by: FAMILY MEDICINE

## 2021-08-09 PROCEDURE — 99499 UNLISTED E&M SERVICE: CPT | Mod: S$GLB,,, | Performed by: FAMILY MEDICINE

## 2021-08-09 PROCEDURE — 3074F SYST BP LT 130 MM HG: CPT | Mod: CPTII,S$GLB,, | Performed by: FAMILY MEDICINE

## 2021-08-09 PROCEDURE — 1101F PT FALLS ASSESS-DOCD LE1/YR: CPT | Mod: CPTII,S$GLB,, | Performed by: FAMILY MEDICINE

## 2021-08-09 PROCEDURE — 1160F RVW MEDS BY RX/DR IN RCRD: CPT | Mod: CPTII,S$GLB,, | Performed by: FAMILY MEDICINE

## 2021-08-09 PROCEDURE — 3078F DIAST BP <80 MM HG: CPT | Mod: CPTII,S$GLB,, | Performed by: FAMILY MEDICINE

## 2021-08-09 PROCEDURE — 99999 PR PBB SHADOW E&M-EST. PATIENT-LVL IV: CPT | Mod: PBBFAC,,, | Performed by: FAMILY MEDICINE

## 2021-08-09 PROCEDURE — 1101F PR PT FALLS ASSESS DOC 0-1 FALLS W/OUT INJ PAST YR: ICD-10-PCS | Mod: CPTII,S$GLB,, | Performed by: FAMILY MEDICINE

## 2021-08-09 PROCEDURE — 1125F AMNT PAIN NOTED PAIN PRSNT: CPT | Mod: CPTII,S$GLB,, | Performed by: FAMILY MEDICINE

## 2021-08-09 PROCEDURE — 1160F PR REVIEW ALL MEDS BY PRESCRIBER/CLIN PHARMACIST DOCUMENTED: ICD-10-PCS | Mod: CPTII,S$GLB,, | Performed by: FAMILY MEDICINE

## 2021-08-09 PROCEDURE — 99214 PR OFFICE/OUTPT VISIT, EST, LEVL IV, 30-39 MIN: ICD-10-PCS | Mod: S$GLB,,, | Performed by: FAMILY MEDICINE

## 2021-08-09 PROCEDURE — 3288F PR FALLS RISK ASSESSMENT DOCUMENTED: ICD-10-PCS | Mod: CPTII,S$GLB,, | Performed by: FAMILY MEDICINE

## 2021-08-09 PROCEDURE — 99499 RISK ADDL DX/OHS AUDIT: ICD-10-PCS | Mod: S$GLB,,, | Performed by: FAMILY MEDICINE

## 2021-08-09 PROCEDURE — 99214 OFFICE O/P EST MOD 30 MIN: CPT | Mod: S$GLB,,, | Performed by: FAMILY MEDICINE

## 2021-08-09 PROCEDURE — 1159F MED LIST DOCD IN RCRD: CPT | Mod: CPTII,S$GLB,, | Performed by: FAMILY MEDICINE

## 2021-08-09 PROCEDURE — 99999 PR PBB SHADOW E&M-EST. PATIENT-LVL IV: ICD-10-PCS | Mod: PBBFAC,,, | Performed by: FAMILY MEDICINE

## 2021-08-09 PROCEDURE — 3078F PR MOST RECENT DIASTOLIC BLOOD PRESSURE < 80 MM HG: ICD-10-PCS | Mod: CPTII,S$GLB,, | Performed by: FAMILY MEDICINE

## 2021-08-09 PROCEDURE — 1125F PR PAIN SEVERITY QUANTIFIED, PAIN PRESENT: ICD-10-PCS | Mod: CPTII,S$GLB,, | Performed by: FAMILY MEDICINE

## 2021-08-09 PROCEDURE — 1159F PR MEDICATION LIST DOCUMENTED IN MEDICAL RECORD: ICD-10-PCS | Mod: CPTII,S$GLB,, | Performed by: FAMILY MEDICINE

## 2021-08-09 PROCEDURE — 3074F PR MOST RECENT SYSTOLIC BLOOD PRESSURE < 130 MM HG: ICD-10-PCS | Mod: CPTII,S$GLB,, | Performed by: FAMILY MEDICINE

## 2021-08-09 RX ORDER — MONTELUKAST SODIUM 10 MG/1
TABLET ORAL
Qty: 90 TABLET | Refills: 3 | Status: SHIPPED | OUTPATIENT
Start: 2021-08-09 | End: 2022-07-06

## 2021-08-09 RX ORDER — LEVOTHYROXINE SODIUM 25 UG/1
TABLET ORAL
Qty: 90 TABLET | Refills: 3 | Status: SHIPPED | OUTPATIENT
Start: 2021-08-09 | End: 2022-07-06

## 2021-08-09 RX ORDER — AMOXICILLIN AND CLAVULANATE POTASSIUM 875; 125 MG/1; MG/1
1 TABLET, FILM COATED ORAL EVERY 12 HOURS
Qty: 20 TABLET | Refills: 0 | Status: SHIPPED | OUTPATIENT
Start: 2021-08-09 | End: 2021-08-09

## 2021-08-09 RX ORDER — OXYBUTYNIN CHLORIDE 10 MG/1
10 TABLET, EXTENDED RELEASE ORAL DAILY
Qty: 90 TABLET | Refills: 3 | Status: SHIPPED | OUTPATIENT
Start: 2021-08-09 | End: 2022-07-06

## 2021-08-09 RX ORDER — AMOXICILLIN AND CLAVULANATE POTASSIUM 875; 125 MG/1; MG/1
1 TABLET, FILM COATED ORAL EVERY 12 HOURS
Qty: 20 TABLET | Refills: 0 | Status: SHIPPED | OUTPATIENT
Start: 2021-08-09 | End: 2022-04-22 | Stop reason: ALTCHOICE

## 2021-08-09 RX ORDER — OMEPRAZOLE 20 MG/1
CAPSULE, DELAYED RELEASE ORAL
Qty: 90 CAPSULE | Refills: 3 | Status: SHIPPED | OUTPATIENT
Start: 2021-08-09 | End: 2022-07-06

## 2021-08-09 RX ORDER — AMLODIPINE BESYLATE 10 MG/1
10 TABLET ORAL DAILY
Qty: 90 TABLET | Refills: 3 | Status: SHIPPED | OUTPATIENT
Start: 2021-08-09 | End: 2022-07-06

## 2021-08-09 RX ORDER — ATORVASTATIN CALCIUM 40 MG/1
40 TABLET, FILM COATED ORAL NIGHTLY
Qty: 90 TABLET | Refills: 3 | Status: SHIPPED | OUTPATIENT
Start: 2021-08-09 | End: 2022-04-25

## 2021-08-09 RX ORDER — METOPROLOL SUCCINATE 50 MG/1
50 TABLET, EXTENDED RELEASE ORAL DAILY
Qty: 90 TABLET | Refills: 3 | Status: SHIPPED | OUTPATIENT
Start: 2021-08-09 | End: 2022-07-06

## 2021-08-09 RX ORDER — ALLOPURINOL 100 MG/1
100 TABLET ORAL DAILY
Qty: 90 TABLET | Refills: 3 | Status: SHIPPED | OUTPATIENT
Start: 2021-08-09 | End: 2022-07-06

## 2021-08-09 RX ORDER — ANASTROZOLE 1 MG/1
1 TABLET ORAL DAILY
Qty: 90 TABLET | Refills: 0 | Status: CANCELLED | OUTPATIENT
Start: 2021-08-09

## 2021-08-16 ENCOUNTER — HOSPITAL ENCOUNTER (OUTPATIENT)
Dept: RADIOLOGY | Facility: HOSPITAL | Age: 81
Discharge: HOME | End: 2021-08-16
Attending: FAMILY MEDICINE
Payer: MEDICARE

## 2021-08-16 DIAGNOSIS — R60.1 GENERALIZED EDEMA: ICD-10-CM

## 2021-08-16 DIAGNOSIS — I89.0 LYMPHEDEMA: ICD-10-CM

## 2021-08-16 PROCEDURE — 93970 EXTREMITY STUDY: CPT | Mod: 26,,, | Performed by: RADIOLOGY

## 2021-08-16 PROCEDURE — 93970 US LOWER EXTREMITY VEINS BILATERAL INSUFFICIENCY: ICD-10-PCS | Mod: 26,,, | Performed by: RADIOLOGY

## 2021-08-16 PROCEDURE — 93922 UPR/L XTREMITY ART 2 LEVELS: CPT | Mod: 26,,, | Performed by: RADIOLOGY

## 2021-08-16 PROCEDURE — 93922 UPR/L XTREMITY ART 2 LEVELS: CPT | Mod: TC

## 2021-08-16 PROCEDURE — 93922 US ANKLE/BRACH INDICES EXT LTD W/O STR 1-2 LEVELS: ICD-10-PCS | Mod: 26,,, | Performed by: RADIOLOGY

## 2021-08-16 PROCEDURE — 93970 EXTREMITY STUDY: CPT | Mod: TC

## 2021-08-17 ENCOUNTER — TELEPHONE (OUTPATIENT)
Dept: ENDOCRINOLOGY | Facility: CLINIC | Age: 81
End: 2021-08-17

## 2021-08-17 ENCOUNTER — TELEPHONE (OUTPATIENT)
Dept: FAMILY MEDICINE | Facility: CLINIC | Age: 81
End: 2021-08-17

## 2021-09-10 ENCOUNTER — TELEPHONE (OUTPATIENT)
Dept: FAMILY MEDICINE | Facility: CLINIC | Age: 81
End: 2021-09-10

## 2021-09-10 DIAGNOSIS — I89.0 LYMPHEDEMA: ICD-10-CM

## 2021-09-10 DIAGNOSIS — J44.9 CHRONIC OBSTRUCTIVE PULMONARY DISEASE, UNSPECIFIED COPD TYPE: Primary | ICD-10-CM

## 2021-09-10 DIAGNOSIS — R54 AGE-RELATED PHYSICAL DEBILITY: ICD-10-CM

## 2021-10-01 ENCOUNTER — TELEPHONE (OUTPATIENT)
Dept: FAMILY MEDICINE | Facility: CLINIC | Age: 81
End: 2021-10-01

## 2021-10-01 DIAGNOSIS — Z12.31 SCREENING MAMMOGRAM, ENCOUNTER FOR: Primary | ICD-10-CM

## 2021-10-18 ENCOUNTER — HOSPITAL ENCOUNTER (OUTPATIENT)
Dept: RADIOLOGY | Facility: HOSPITAL | Age: 81
Discharge: HOME | End: 2021-10-18
Attending: FAMILY MEDICINE
Payer: MEDICARE

## 2021-10-18 DIAGNOSIS — Z12.31 SCREENING MAMMOGRAM, ENCOUNTER FOR: ICD-10-CM

## 2021-10-18 PROCEDURE — 77067 SCR MAMMO BI INCL CAD: CPT | Mod: 26,,, | Performed by: RADIOLOGY

## 2021-10-18 PROCEDURE — 77063 BREAST TOMOSYNTHESIS BI: CPT | Mod: 26,,, | Performed by: RADIOLOGY

## 2021-10-18 PROCEDURE — 77067 MAMMO DIGITAL SCREENING BILAT WITH TOMO: ICD-10-PCS | Mod: 26,,, | Performed by: RADIOLOGY

## 2021-10-18 PROCEDURE — 77063 MAMMO DIGITAL SCREENING BILAT WITH TOMO: ICD-10-PCS | Mod: 26,,, | Performed by: RADIOLOGY

## 2021-10-18 PROCEDURE — 77067 SCR MAMMO BI INCL CAD: CPT | Mod: TC

## 2021-10-20 ENCOUNTER — TELEPHONE (OUTPATIENT)
Dept: FAMILY MEDICINE | Facility: CLINIC | Age: 81
End: 2021-10-20

## 2021-10-22 ENCOUNTER — PES CALL (OUTPATIENT)
Dept: ADMINISTRATIVE | Facility: CLINIC | Age: 81
End: 2021-10-22

## 2021-11-03 ENCOUNTER — OFFICE VISIT (OUTPATIENT)
Dept: ENDOCRINOLOGY | Facility: CLINIC | Age: 81
End: 2021-11-03
Payer: MEDICARE

## 2021-11-03 ENCOUNTER — LAB VISIT (OUTPATIENT)
Dept: LAB | Facility: HOSPITAL | Age: 81
End: 2021-11-03
Attending: HOSPITALIST
Payer: MEDICARE

## 2021-11-03 VITALS
SYSTOLIC BLOOD PRESSURE: 134 MMHG | TEMPERATURE: 98 F | DIASTOLIC BLOOD PRESSURE: 88 MMHG | BODY MASS INDEX: 30.51 KG/M2 | HEART RATE: 69 BPM | WEIGHT: 169.56 LBS

## 2021-11-03 DIAGNOSIS — E21.3 HYPERPARATHYROIDISM, UNSPECIFIED: ICD-10-CM

## 2021-11-03 DIAGNOSIS — I10 ESSENTIAL HYPERTENSION: Chronic | ICD-10-CM

## 2021-11-03 DIAGNOSIS — E03.9 HYPOTHYROIDISM, UNSPECIFIED TYPE: ICD-10-CM

## 2021-11-03 DIAGNOSIS — E21.1 HYPERPARATHYROIDISM , SECONDARY, NON-RENAL: ICD-10-CM

## 2021-11-03 DIAGNOSIS — M85.80 OSTEOPENIA, UNSPECIFIED LOCATION: Primary | ICD-10-CM

## 2021-11-03 DIAGNOSIS — M85.80 OSTEOPENIA, UNSPECIFIED LOCATION: ICD-10-CM

## 2021-11-03 LAB
25(OH)D3+25(OH)D2 SERPL-MCNC: 49 NG/ML (ref 30–96)
ALBUMIN SERPL BCP-MCNC: 4 G/DL (ref 3.5–5.2)
ALP SERPL-CCNC: 128 U/L (ref 55–135)
ALT SERPL W/O P-5'-P-CCNC: 8 U/L (ref 10–44)
ANION GAP SERPL CALC-SCNC: 10 MMOL/L (ref 8–16)
AST SERPL-CCNC: 13 U/L (ref 10–40)
BILIRUB SERPL-MCNC: 0.5 MG/DL (ref 0.1–1)
BUN SERPL-MCNC: 21 MG/DL (ref 8–23)
CALCIUM SERPL-MCNC: 11 MG/DL (ref 8.7–10.5)
CHLORIDE SERPL-SCNC: 105 MMOL/L (ref 95–110)
CO2 SERPL-SCNC: 25 MMOL/L (ref 23–29)
CREAT SERPL-MCNC: 0.8 MG/DL (ref 0.5–1.4)
EST. GFR  (AFRICAN AMERICAN): >60 ML/MIN/1.73 M^2
EST. GFR  (NON AFRICAN AMERICAN): >60 ML/MIN/1.73 M^2
GLUCOSE SERPL-MCNC: 96 MG/DL (ref 70–110)
POTASSIUM SERPL-SCNC: 4.3 MMOL/L (ref 3.5–5.1)
PROT SERPL-MCNC: 7.8 G/DL (ref 6–8.4)
PTH-INTACT SERPL-MCNC: 43.6 PG/ML (ref 9–77)
SODIUM SERPL-SCNC: 140 MMOL/L (ref 136–145)
TSH SERPL DL<=0.005 MIU/L-ACNC: 1.98 UIU/ML (ref 0.4–4)

## 2021-11-03 PROCEDURE — 82306 VITAMIN D 25 HYDROXY: CPT | Performed by: HOSPITALIST

## 2021-11-03 PROCEDURE — 3079F PR MOST RECENT DIASTOLIC BLOOD PRESSURE 80-89 MM HG: ICD-10-PCS | Mod: CPTII,S$GLB,, | Performed by: HOSPITALIST

## 2021-11-03 PROCEDURE — 1160F PR REVIEW ALL MEDS BY PRESCRIBER/CLIN PHARMACIST DOCUMENTED: ICD-10-PCS | Mod: CPTII,S$GLB,, | Performed by: HOSPITALIST

## 2021-11-03 PROCEDURE — 3075F SYST BP GE 130 - 139MM HG: CPT | Mod: CPTII,S$GLB,, | Performed by: HOSPITALIST

## 2021-11-03 PROCEDURE — 99999 PR PBB SHADOW E&M-EST. PATIENT-LVL IV: CPT | Mod: PBBFAC,,, | Performed by: HOSPITALIST

## 2021-11-03 PROCEDURE — 1101F PT FALLS ASSESS-DOCD LE1/YR: CPT | Mod: CPTII,S$GLB,, | Performed by: HOSPITALIST

## 2021-11-03 PROCEDURE — 3288F PR FALLS RISK ASSESSMENT DOCUMENTED: ICD-10-PCS | Mod: CPTII,S$GLB,, | Performed by: HOSPITALIST

## 2021-11-03 PROCEDURE — 3288F FALL RISK ASSESSMENT DOCD: CPT | Mod: CPTII,S$GLB,, | Performed by: HOSPITALIST

## 2021-11-03 PROCEDURE — 99204 OFFICE O/P NEW MOD 45 MIN: CPT | Mod: S$GLB,,, | Performed by: HOSPITALIST

## 2021-11-03 PROCEDURE — 84443 ASSAY THYROID STIM HORMONE: CPT | Performed by: HOSPITALIST

## 2021-11-03 PROCEDURE — 3075F PR MOST RECENT SYSTOLIC BLOOD PRESS GE 130-139MM HG: ICD-10-PCS | Mod: CPTII,S$GLB,, | Performed by: HOSPITALIST

## 2021-11-03 PROCEDURE — 99499 UNLISTED E&M SERVICE: CPT | Mod: S$GLB,,, | Performed by: HOSPITALIST

## 2021-11-03 PROCEDURE — 99499 RISK ADDL DX/OHS AUDIT: ICD-10-PCS | Mod: S$GLB,,, | Performed by: HOSPITALIST

## 2021-11-03 PROCEDURE — 1159F MED LIST DOCD IN RCRD: CPT | Mod: CPTII,S$GLB,, | Performed by: HOSPITALIST

## 2021-11-03 PROCEDURE — 1126F PR PAIN SEVERITY QUANTIFIED, NO PAIN PRESENT: ICD-10-PCS | Mod: CPTII,S$GLB,, | Performed by: HOSPITALIST

## 2021-11-03 PROCEDURE — 1126F AMNT PAIN NOTED NONE PRSNT: CPT | Mod: CPTII,S$GLB,, | Performed by: HOSPITALIST

## 2021-11-03 PROCEDURE — 1101F PR PT FALLS ASSESS DOC 0-1 FALLS W/OUT INJ PAST YR: ICD-10-PCS | Mod: CPTII,S$GLB,, | Performed by: HOSPITALIST

## 2021-11-03 PROCEDURE — 1160F RVW MEDS BY RX/DR IN RCRD: CPT | Mod: CPTII,S$GLB,, | Performed by: HOSPITALIST

## 2021-11-03 PROCEDURE — 3079F DIAST BP 80-89 MM HG: CPT | Mod: CPTII,S$GLB,, | Performed by: HOSPITALIST

## 2021-11-03 PROCEDURE — 99204 PR OFFICE/OUTPT VISIT, NEW, LEVL IV, 45-59 MIN: ICD-10-PCS | Mod: S$GLB,,, | Performed by: HOSPITALIST

## 2021-11-03 PROCEDURE — 36415 COLL VENOUS BLD VENIPUNCTURE: CPT | Mod: PO | Performed by: HOSPITALIST

## 2021-11-03 PROCEDURE — 1159F PR MEDICATION LIST DOCUMENTED IN MEDICAL RECORD: ICD-10-PCS | Mod: CPTII,S$GLB,, | Performed by: HOSPITALIST

## 2021-11-03 PROCEDURE — 99999 PR PBB SHADOW E&M-EST. PATIENT-LVL IV: ICD-10-PCS | Mod: PBBFAC,,, | Performed by: HOSPITALIST

## 2021-11-03 PROCEDURE — 80053 COMPREHEN METABOLIC PANEL: CPT | Performed by: HOSPITALIST

## 2021-11-03 PROCEDURE — 83970 ASSAY OF PARATHORMONE: CPT | Performed by: HOSPITALIST

## 2021-11-16 ENCOUNTER — TELEPHONE (OUTPATIENT)
Dept: ENDOCRINOLOGY | Facility: CLINIC | Age: 81
End: 2021-11-16
Payer: MEDICARE

## 2021-12-02 ENCOUNTER — PES CALL (OUTPATIENT)
Dept: ADMINISTRATIVE | Facility: CLINIC | Age: 81
End: 2021-12-02
Payer: MEDICARE

## 2021-12-16 ENCOUNTER — HOSPITAL ENCOUNTER (OUTPATIENT)
Dept: RADIOLOGY | Facility: CLINIC | Age: 81
Discharge: HOME | End: 2021-12-16
Attending: HOSPITALIST
Payer: MEDICARE

## 2021-12-16 DIAGNOSIS — M85.80 OSTEOPENIA, UNSPECIFIED LOCATION: ICD-10-CM

## 2021-12-16 DIAGNOSIS — E21.1 HYPERPARATHYROIDISM , SECONDARY, NON-RENAL: ICD-10-CM

## 2021-12-16 DIAGNOSIS — E21.3 HYPERPARATHYROIDISM, UNSPECIFIED: ICD-10-CM

## 2021-12-16 PROCEDURE — 77080 DXA BONE DENSITY AXIAL: CPT | Mod: TC,PO

## 2021-12-16 PROCEDURE — 77080 DEXA BONE DENSITY SPINE HIP: ICD-10-PCS | Mod: 26,,, | Performed by: INTERNAL MEDICINE

## 2021-12-16 PROCEDURE — 77080 DXA BONE DENSITY AXIAL: CPT | Mod: 26,,, | Performed by: INTERNAL MEDICINE

## 2022-01-13 ENCOUNTER — PES CALL (OUTPATIENT)
Dept: ADMINISTRATIVE | Facility: CLINIC | Age: 82
End: 2022-01-13
Payer: MEDICARE

## 2022-01-26 ENCOUNTER — OFFICE VISIT (OUTPATIENT)
Dept: ENDOCRINOLOGY | Facility: CLINIC | Age: 82
End: 2022-01-26
Payer: MEDICARE

## 2022-01-26 VITALS
TEMPERATURE: 99 F | SYSTOLIC BLOOD PRESSURE: 105 MMHG | BODY MASS INDEX: 30.42 KG/M2 | DIASTOLIC BLOOD PRESSURE: 61 MMHG | HEART RATE: 81 BPM | WEIGHT: 169 LBS

## 2022-01-26 DIAGNOSIS — M85.80 OSTEOPENIA, UNSPECIFIED LOCATION: Primary | ICD-10-CM

## 2022-01-26 DIAGNOSIS — E03.9 HYPOTHYROIDISM, UNSPECIFIED TYPE: Chronic | ICD-10-CM

## 2022-01-26 DIAGNOSIS — E21.1 HYPERPARATHYROIDISM , SECONDARY, NON-RENAL: ICD-10-CM

## 2022-01-26 DIAGNOSIS — M81.0 AGE-RELATED OSTEOPOROSIS WITHOUT CURRENT PATHOLOGICAL FRACTURE: ICD-10-CM

## 2022-01-26 PROCEDURE — 1126F AMNT PAIN NOTED NONE PRSNT: CPT | Mod: CPTII,S$GLB,, | Performed by: HOSPITALIST

## 2022-01-26 PROCEDURE — 99499 UNLISTED E&M SERVICE: CPT | Mod: S$GLB,,, | Performed by: HOSPITALIST

## 2022-01-26 PROCEDURE — 3288F FALL RISK ASSESSMENT DOCD: CPT | Mod: CPTII,S$GLB,, | Performed by: HOSPITALIST

## 2022-01-26 PROCEDURE — 1160F PR REVIEW ALL MEDS BY PRESCRIBER/CLIN PHARMACIST DOCUMENTED: ICD-10-PCS | Mod: CPTII,S$GLB,, | Performed by: HOSPITALIST

## 2022-01-26 PROCEDURE — 99999 PR PBB SHADOW E&M-EST. PATIENT-LVL IV: ICD-10-PCS | Mod: PBBFAC,,, | Performed by: HOSPITALIST

## 2022-01-26 PROCEDURE — 3288F PR FALLS RISK ASSESSMENT DOCUMENTED: ICD-10-PCS | Mod: CPTII,S$GLB,, | Performed by: HOSPITALIST

## 2022-01-26 PROCEDURE — 1126F PR PAIN SEVERITY QUANTIFIED, NO PAIN PRESENT: ICD-10-PCS | Mod: CPTII,S$GLB,, | Performed by: HOSPITALIST

## 2022-01-26 PROCEDURE — 3074F PR MOST RECENT SYSTOLIC BLOOD PRESSURE < 130 MM HG: ICD-10-PCS | Mod: CPTII,S$GLB,, | Performed by: HOSPITALIST

## 2022-01-26 PROCEDURE — 99499 RISK ADDL DX/OHS AUDIT: ICD-10-PCS | Mod: S$GLB,,, | Performed by: HOSPITALIST

## 2022-01-26 PROCEDURE — 99999 PR PBB SHADOW E&M-EST. PATIENT-LVL IV: CPT | Mod: PBBFAC,,, | Performed by: HOSPITALIST

## 2022-01-26 PROCEDURE — 1101F PR PT FALLS ASSESS DOC 0-1 FALLS W/OUT INJ PAST YR: ICD-10-PCS | Mod: CPTII,S$GLB,, | Performed by: HOSPITALIST

## 2022-01-26 PROCEDURE — 3078F DIAST BP <80 MM HG: CPT | Mod: CPTII,S$GLB,, | Performed by: HOSPITALIST

## 2022-01-26 PROCEDURE — 99214 PR OFFICE/OUTPT VISIT, EST, LEVL IV, 30-39 MIN: ICD-10-PCS | Mod: S$GLB,,, | Performed by: HOSPITALIST

## 2022-01-26 PROCEDURE — 1159F MED LIST DOCD IN RCRD: CPT | Mod: CPTII,S$GLB,, | Performed by: HOSPITALIST

## 2022-01-26 PROCEDURE — 1159F PR MEDICATION LIST DOCUMENTED IN MEDICAL RECORD: ICD-10-PCS | Mod: CPTII,S$GLB,, | Performed by: HOSPITALIST

## 2022-01-26 PROCEDURE — 1101F PT FALLS ASSESS-DOCD LE1/YR: CPT | Mod: CPTII,S$GLB,, | Performed by: HOSPITALIST

## 2022-01-26 PROCEDURE — 1160F RVW MEDS BY RX/DR IN RCRD: CPT | Mod: CPTII,S$GLB,, | Performed by: HOSPITALIST

## 2022-01-26 PROCEDURE — 3074F SYST BP LT 130 MM HG: CPT | Mod: CPTII,S$GLB,, | Performed by: HOSPITALIST

## 2022-01-26 PROCEDURE — 99214 OFFICE O/P EST MOD 30 MIN: CPT | Mod: S$GLB,,, | Performed by: HOSPITALIST

## 2022-01-26 PROCEDURE — 3078F PR MOST RECENT DIASTOLIC BLOOD PRESSURE < 80 MM HG: ICD-10-PCS | Mod: CPTII,S$GLB,, | Performed by: HOSPITALIST
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
## 2022-01-26 NOTE — ASSESSMENT & PLAN NOTE
- bone density in 2014 show history of osteopenia, with elevated FRAX  - Chart review show possible treatment with Prolia in the past, unclear why was stop  - recent bone density scan sure worsen of osteoporosis, worsening when compared to 2014  - patient is to continue vitamin-D/calcium supplement at this time  - High fall risk given poor gait instability, uses walker at home  - plan to start patient back on Prolia given CKD and previous Prolia uses  - we will hold off on therapy due to the need of dental work, advised patient to seek dental care soon, contact me once resolution to start therapy

## 2022-01-26 NOTE — ASSESSMENT & PLAN NOTE
- Suspect primary/secondary hyperparathyroidism, in setting of normalcalcemia  - possibly due to history of radiation therapy to chest for breast cancer  - Medications were reviewed:  not contributing   - Dietary reviewed: not contributing  - history of CKD stage III, now with no dysfunction at this time  - Mechanism of hyperparathyroidism leading to hypercalcemia was explained to pt, all questions were answered  - bone density showing osteoporosis of wrist    Plan  - patient is not interested for surgery give primary is determined  - normal vitamin-D, PTH did decrease, mild elevation of calcium noted on blood work done 2 months ago  - we will repeat CMP to monitor soon

## 2022-04-12 ENCOUNTER — TELEPHONE (OUTPATIENT)
Dept: FAMILY MEDICINE | Facility: CLINIC | Age: 82
End: 2022-04-12
Payer: MEDICARE

## 2022-04-12 NOTE — TELEPHONE ENCOUNTER
Left a voicemail message for the Patient to call the office back @ [phone] to be schedule for an Enhanced Annual Wellness Visit (EAWV)

## 2022-04-19 ENCOUNTER — LAB VISIT (OUTPATIENT)
Dept: LAB | Facility: HOSPITAL | Age: 82
End: 2022-04-19
Attending: FAMILY MEDICINE
Payer: MEDICARE

## 2022-04-19 DIAGNOSIS — E21.3 HYPERPARATHYROIDISM: ICD-10-CM

## 2022-04-19 DIAGNOSIS — E03.9 HYPOTHYROIDISM, UNSPECIFIED TYPE: ICD-10-CM

## 2022-04-19 LAB
25(OH)D3+25(OH)D2 SERPL-MCNC: 57 NG/ML (ref 30–96)
ALBUMIN SERPL BCP-MCNC: 3.8 G/DL (ref 3.5–5.2)
ALP SERPL-CCNC: 119 U/L (ref 55–135)
ALT SERPL W/O P-5'-P-CCNC: 8 U/L (ref 10–44)
ANION GAP SERPL CALC-SCNC: 11 MMOL/L (ref 8–16)
AST SERPL-CCNC: 13 U/L (ref 10–40)
BASOPHILS # BLD AUTO: 0.07 K/UL (ref 0–0.2)
BASOPHILS NFR BLD: 1 % (ref 0–1.9)
BILIRUB SERPL-MCNC: 0.6 MG/DL (ref 0.1–1)
BUN SERPL-MCNC: 27 MG/DL (ref 8–23)
CALCIUM SERPL-MCNC: 10 MG/DL (ref 8.7–10.5)
CHLORIDE SERPL-SCNC: 102 MMOL/L (ref 95–110)
CHOLEST SERPL-MCNC: 182 MG/DL (ref 120–199)
CHOLEST/HDLC SERPL: 4 {RATIO} (ref 2–5)
CO2 SERPL-SCNC: 26 MMOL/L (ref 23–29)
CREAT SERPL-MCNC: 0.8 MG/DL (ref 0.5–1.4)
DIFFERENTIAL METHOD: ABNORMAL
EOSINOPHIL # BLD AUTO: 0.1 K/UL (ref 0–0.5)
EOSINOPHIL NFR BLD: 1.8 % (ref 0–8)
ERYTHROCYTE [DISTWIDTH] IN BLOOD BY AUTOMATED COUNT: 15.6 % (ref 11.5–14.5)
EST. GFR  (AFRICAN AMERICAN): >60 ML/MIN/1.73 M^2
EST. GFR  (NON AFRICAN AMERICAN): >60 ML/MIN/1.73 M^2
GLUCOSE SERPL-MCNC: 102 MG/DL (ref 70–110)
HCT VFR BLD AUTO: 37.5 % (ref 37–48.5)
HDLC SERPL-MCNC: 45 MG/DL (ref 40–75)
HDLC SERPL: 24.7 % (ref 20–50)
HGB BLD-MCNC: 11.4 G/DL (ref 12–16)
IMM GRANULOCYTES # BLD AUTO: 0.08 K/UL (ref 0–0.04)
IMM GRANULOCYTES NFR BLD AUTO: 1.2 % (ref 0–0.5)
LDLC SERPL CALC-MCNC: 112.4 MG/DL (ref 63–159)
LYMPHOCYTES # BLD AUTO: 1.7 K/UL (ref 1–4.8)
LYMPHOCYTES NFR BLD: 25.2 % (ref 18–48)
MCH RBC QN AUTO: 26.7 PG (ref 27–31)
MCHC RBC AUTO-ENTMCNC: 30.4 G/DL (ref 32–36)
MCV RBC AUTO: 88 FL (ref 82–98)
MONOCYTES # BLD AUTO: 0.5 K/UL (ref 0.3–1)
MONOCYTES NFR BLD: 7.9 % (ref 4–15)
NEUTROPHILS # BLD AUTO: 4.3 K/UL (ref 1.8–7.7)
NEUTROPHILS NFR BLD: 62.9 % (ref 38–73)
NONHDLC SERPL-MCNC: 137 MG/DL
NRBC BLD-RTO: 0 /100 WBC
PLATELET # BLD AUTO: 412 K/UL (ref 150–450)
PMV BLD AUTO: 9.6 FL (ref 9.2–12.9)
POTASSIUM SERPL-SCNC: 4.5 MMOL/L (ref 3.5–5.1)
PROT SERPL-MCNC: 7.5 G/DL (ref 6–8.4)
RBC # BLD AUTO: 4.27 M/UL (ref 4–5.4)
SODIUM SERPL-SCNC: 139 MMOL/L (ref 136–145)
TRIGL SERPL-MCNC: 123 MG/DL (ref 30–150)
TSH SERPL DL<=0.005 MIU/L-ACNC: 2.8 UIU/ML (ref 0.4–4)
WBC # BLD AUTO: 6.83 K/UL (ref 3.9–12.7)

## 2022-04-19 PROCEDURE — 85025 COMPLETE CBC W/AUTO DIFF WBC: CPT | Performed by: FAMILY MEDICINE

## 2022-04-19 PROCEDURE — 36415 COLL VENOUS BLD VENIPUNCTURE: CPT | Mod: PO | Performed by: FAMILY MEDICINE

## 2022-04-19 PROCEDURE — 80061 LIPID PANEL: CPT | Performed by: FAMILY MEDICINE

## 2022-04-19 PROCEDURE — 84443 ASSAY THYROID STIM HORMONE: CPT | Performed by: FAMILY MEDICINE

## 2022-04-19 PROCEDURE — 82306 VITAMIN D 25 HYDROXY: CPT | Performed by: FAMILY MEDICINE

## 2022-04-19 PROCEDURE — 80053 COMPREHEN METABOLIC PANEL: CPT | Performed by: FAMILY MEDICINE

## 2022-04-22 ENCOUNTER — OFFICE VISIT (OUTPATIENT)
Dept: FAMILY MEDICINE | Facility: CLINIC | Age: 82
End: 2022-04-22
Payer: MEDICARE

## 2022-04-22 VITALS
HEART RATE: 94 BPM | SYSTOLIC BLOOD PRESSURE: 116 MMHG | OXYGEN SATURATION: 98 % | DIASTOLIC BLOOD PRESSURE: 58 MMHG | BODY MASS INDEX: 31.28 KG/M2 | HEIGHT: 62 IN | WEIGHT: 170 LBS | TEMPERATURE: 98 F

## 2022-04-22 DIAGNOSIS — E21.3 HYPERPARATHYROIDISM: ICD-10-CM

## 2022-04-22 DIAGNOSIS — J44.9 CHRONIC OBSTRUCTIVE PULMONARY DISEASE, UNSPECIFIED COPD TYPE: ICD-10-CM

## 2022-04-22 DIAGNOSIS — I10 ESSENTIAL HYPERTENSION: ICD-10-CM

## 2022-04-22 DIAGNOSIS — Z23 NEED FOR SHINGLES VACCINE: Primary | ICD-10-CM

## 2022-04-22 DIAGNOSIS — I89.0 LYMPHEDEMA: ICD-10-CM

## 2022-04-22 DIAGNOSIS — N18.31 STAGE 3A CHRONIC KIDNEY DISEASE: ICD-10-CM

## 2022-04-22 PROCEDURE — 90750 HZV VACC RECOMBINANT IM: CPT | Mod: S$GLB,,, | Performed by: FAMILY MEDICINE

## 2022-04-22 PROCEDURE — 90471 ZOSTER RECOMBINANT VACCINE: ICD-10-PCS | Mod: S$GLB,,, | Performed by: FAMILY MEDICINE

## 2022-04-22 PROCEDURE — 1126F PR PAIN SEVERITY QUANTIFIED, NO PAIN PRESENT: ICD-10-PCS | Mod: CPTII,S$GLB,, | Performed by: FAMILY MEDICINE

## 2022-04-22 PROCEDURE — 1159F PR MEDICATION LIST DOCUMENTED IN MEDICAL RECORD: ICD-10-PCS | Mod: CPTII,S$GLB,, | Performed by: FAMILY MEDICINE

## 2022-04-22 PROCEDURE — 1101F PT FALLS ASSESS-DOCD LE1/YR: CPT | Mod: CPTII,S$GLB,, | Performed by: FAMILY MEDICINE

## 2022-04-22 PROCEDURE — 3078F DIAST BP <80 MM HG: CPT | Mod: CPTII,S$GLB,, | Performed by: FAMILY MEDICINE

## 2022-04-22 PROCEDURE — 3074F SYST BP LT 130 MM HG: CPT | Mod: CPTII,S$GLB,, | Performed by: FAMILY MEDICINE

## 2022-04-22 PROCEDURE — 3078F PR MOST RECENT DIASTOLIC BLOOD PRESSURE < 80 MM HG: ICD-10-PCS | Mod: CPTII,S$GLB,, | Performed by: FAMILY MEDICINE

## 2022-04-22 PROCEDURE — 99999 PR PBB SHADOW E&M-EST. PATIENT-LVL IV: ICD-10-PCS | Mod: PBBFAC,,, | Performed by: FAMILY MEDICINE

## 2022-04-22 PROCEDURE — 1160F PR REVIEW ALL MEDS BY PRESCRIBER/CLIN PHARMACIST DOCUMENTED: ICD-10-PCS | Mod: CPTII,S$GLB,, | Performed by: FAMILY MEDICINE

## 2022-04-22 PROCEDURE — 3074F PR MOST RECENT SYSTOLIC BLOOD PRESSURE < 130 MM HG: ICD-10-PCS | Mod: CPTII,S$GLB,, | Performed by: FAMILY MEDICINE

## 2022-04-22 PROCEDURE — 99999 PR PBB SHADOW E&M-EST. PATIENT-LVL IV: CPT | Mod: PBBFAC,,, | Performed by: FAMILY MEDICINE

## 2022-04-22 PROCEDURE — 99214 OFFICE O/P EST MOD 30 MIN: CPT | Mod: 25,S$GLB,, | Performed by: FAMILY MEDICINE

## 2022-04-22 PROCEDURE — 1126F AMNT PAIN NOTED NONE PRSNT: CPT | Mod: CPTII,S$GLB,, | Performed by: FAMILY MEDICINE

## 2022-04-22 PROCEDURE — 3288F PR FALLS RISK ASSESSMENT DOCUMENTED: ICD-10-PCS | Mod: CPTII,S$GLB,, | Performed by: FAMILY MEDICINE

## 2022-04-22 PROCEDURE — 1101F PR PT FALLS ASSESS DOC 0-1 FALLS W/OUT INJ PAST YR: ICD-10-PCS | Mod: CPTII,S$GLB,, | Performed by: FAMILY MEDICINE

## 2022-04-22 PROCEDURE — 1160F RVW MEDS BY RX/DR IN RCRD: CPT | Mod: CPTII,S$GLB,, | Performed by: FAMILY MEDICINE

## 2022-04-22 PROCEDURE — 99499 RISK ADDL DX/OHS AUDIT: ICD-10-PCS | Mod: S$GLB,,, | Performed by: FAMILY MEDICINE

## 2022-04-22 PROCEDURE — 1159F MED LIST DOCD IN RCRD: CPT | Mod: CPTII,S$GLB,, | Performed by: FAMILY MEDICINE

## 2022-04-22 PROCEDURE — 90750 ZOSTER RECOMBINANT VACCINE: ICD-10-PCS | Mod: S$GLB,,, | Performed by: FAMILY MEDICINE

## 2022-04-22 PROCEDURE — 3288F FALL RISK ASSESSMENT DOCD: CPT | Mod: CPTII,S$GLB,, | Performed by: FAMILY MEDICINE

## 2022-04-22 PROCEDURE — 90471 IMMUNIZATION ADMIN: CPT | Mod: S$GLB,,, | Performed by: FAMILY MEDICINE

## 2022-04-22 PROCEDURE — 99214 PR OFFICE/OUTPT VISIT, EST, LEVL IV, 30-39 MIN: ICD-10-PCS | Mod: 25,S$GLB,, | Performed by: FAMILY MEDICINE

## 2022-04-22 PROCEDURE — 99499 UNLISTED E&M SERVICE: CPT | Mod: S$GLB,,, | Performed by: FAMILY MEDICINE

## 2022-04-22 NOTE — PROGRESS NOTES
Patient tolerated injection well, instructed to remain in clinic for 15mins.to monitor for allergic reaction. Verbalized understanding.

## 2022-04-25 NOTE — PROGRESS NOTES
"Routine Office Visit    Ivelisse Kern  1940  [id number]      Subjective     Ivelisse is a 82 y.o. female who presents today for:    1. Blood work - follow-up - Patient is here for blood work follow-up   2. Joint arthritis - Patient continues [facility] Pain is still present.   3. History of breast cancer s/p arimidex therapy - Patient has completed therapy   4. Lymphedema - bilateral leg swelling.     Objective     Review of Systems   Constitutional: Negative for chills and fever.   HENT: Negative for congestion.    Eyes: Negative for blurred vision.   Respiratory: Negative for cough.    Cardiovascular: Negative for chest pain.   Gastrointestinal: Negative for abdominal pain, constipation, diarrhea, heartburn, nausea and vomiting.   Genitourinary: Negative for dysuria.   Musculoskeletal: Negative for myalgias.   Skin: Negative for itching and rash.   Neurological: Negative for dizziness and headaches.   Psychiatric/Behavioral: Negative for depression.       BP (!) 116/58 (BP Location: Left arm, Patient Position: Sitting, BP Method: Medium (Manual))   Pulse 94   Temp 98 °F (36.7 °C) (Oral)   Ht 5' 2" (1.575 m)   Wt 77.1 kg (170 lb)   SpO2 98%   BMI 31.09 kg/m²   Physical Exam  Constitutional:       Appearance: She is well-developed.   HENT:      Head: Normocephalic and atraumatic.   Eyes:      Conjunctiva/sclera: Conjunctivae normal.      Pupils: Pupils are equal, round, and reactive to light.   Cardiovascular:      Rate and Rhythm: Normal rate and regular rhythm.      Heart sounds: Normal heart sounds. No murmur heard.    No friction rub. No gallop.   Pulmonary:      Effort: No respiratory distress.      Breath sounds: Normal breath sounds.   Abdominal:      General: Bowel sounds are normal. There is no distension.      Palpations: Abdomen is soft.      Tenderness: There is no abdominal tenderness.   Musculoskeletal:         General: Normal range of motion.      Cervical back: " Normal range of motion and neck supple.   Lymphadenopathy:      Cervical: No cervical adenopathy.      Comments: Bilateral lymphedema    Skin:     General: Skin is warm.   Neurological:      Mental Status: She is alert and oriented to person, place, and time.           Assessment     Problem List Items Addressed This Visit        Pulmonary    COPD (chronic obstructive pulmonary disease) (Chronic)  The current medical regimen is effective;  continue present plan and medications.         Cardiac/Vascular    Essential hypertension (Chronic)  The current medical regimen is effective;  continue present plan and medications.         Renal/    CKD (chronic kidney disease) stage 3, GFR 30-59 ml/min (Chronic)  Noted in chart  Continue to monitor          Endocrine    Hyperparathyroidism  Noted in chart  Continue to monitor         Other Visit Diagnoses     Need for shingles vaccine    -  Primary    Relevant Orders    Zoster Recombinant Vaccine (Completed)    Lymphedema      Recommend compression stockings   Recommend leg elevation             Follow up in about 6 months (around 10/22/2022), or if symptoms worsen or fail to improve.

## 2022-05-19 RX ORDER — ATORVASTATIN CALCIUM 40 MG/1
40 TABLET, FILM COATED ORAL DAILY
COMMUNITY
End: 2022-05-19 | Stop reason: SDUPTHER

## 2022-05-19 NOTE — TELEPHONE ENCOUNTER
Pt is wondering why her atorvastatin was d/c.  Informed pt note stated pt is no longer taking it.  Please reorder 40 mg qd.

## 2022-05-19 NOTE — TELEPHONE ENCOUNTER
Care Due:                  Date            Visit Type   Department     Provider  --------------------------------------------------------------------------------                                [facility]                              PRIMARY      MED/ INTERNAL  Last Visit: 04-      CARE ([facility])   MED/ PEDS      Mile Young                              [facility]                              PRIMARY      MED/ INTERNAL  Next Visit: 10-      CARE ([facility])   MED/ PEDS      Mile Young                                                            Last  Test          Frequency    Reason                     Performed    Due Date  --------------------------------------------------------------------------------    Uric Acid...  12 months..  allopurinoL..............  Not Found    Overdue    [employer] Embedded Care Gaps. Reference number: [id number]. 5/19/2022   4:58:48 PM CDT

## 2022-05-19 NOTE — TELEPHONE ENCOUNTER
----- Message from Ila Thompson sent at 5/19/2022  4:17 PM CDT -----  Regarding: self [phone]  Type: Patient Call Back    Who called: self    What is the request in detail:  Patient would like to speak to the nurse regarding her mediation.     Can the clinic reply by [facility]? no    Would the patient rather a call back or a response via [facility]?  Call back    Best call back number: [phone]

## 2022-05-20 RX ORDER — ATORVASTATIN CALCIUM 40 MG/1
40 TABLET, FILM COATED ORAL DAILY
Qty: 90 TABLET | Refills: 1 | Status: SHIPPED | OUTPATIENT
Start: 2022-05-20 | End: 2022-07-18 | Stop reason: SDUPTHER

## 2022-07-18 DIAGNOSIS — I10 ESSENTIAL HYPERTENSION: Chronic | ICD-10-CM

## 2022-07-18 DIAGNOSIS — J30.9 ALLERGIC RHINITIS, UNSPECIFIED SEASONALITY, UNSPECIFIED TRIGGER: ICD-10-CM

## 2022-07-18 DIAGNOSIS — R32 URINARY INCONTINENCE, UNSPECIFIED TYPE: ICD-10-CM

## 2022-07-18 DIAGNOSIS — K21.00 GASTROESOPHAGEAL REFLUX DISEASE WITH ESOPHAGITIS, UNSPECIFIED WHETHER HEMORRHAGE: ICD-10-CM

## 2022-07-18 DIAGNOSIS — E03.9 HYPOTHYROIDISM, UNSPECIFIED TYPE: ICD-10-CM

## 2022-07-18 RX ORDER — MONTELUKAST SODIUM 10 MG/1
10 TABLET ORAL NIGHTLY
Qty: 90 TABLET | Refills: 3 | Status: SHIPPED | OUTPATIENT
Start: 2022-07-18 | End: 2023-06-24

## 2022-07-18 RX ORDER — OMEPRAZOLE 20 MG/1
CAPSULE, DELAYED RELEASE ORAL
Qty: 90 CAPSULE | Refills: 3 | Status: SHIPPED | OUTPATIENT
Start: 2022-07-18 | End: 2023-06-24

## 2022-07-18 RX ORDER — ATORVASTATIN CALCIUM 40 MG/1
40 TABLET, FILM COATED ORAL DAILY
Qty: 90 TABLET | Refills: 1 | Status: SHIPPED | OUTPATIENT
Start: 2022-07-18 | End: 2022-12-30

## 2022-07-18 RX ORDER — LEVOTHYROXINE SODIUM 25 UG/1
TABLET ORAL
Qty: 90 TABLET | Refills: 3 | Status: SHIPPED | OUTPATIENT
Start: 2022-07-18 | End: 2023-09-10

## 2022-07-18 RX ORDER — OXYBUTYNIN CHLORIDE 10 MG/1
10 TABLET, EXTENDED RELEASE ORAL DAILY
Qty: 90 TABLET | Refills: 3 | Status: SHIPPED | OUTPATIENT
Start: 2022-07-18 | End: 2023-09-10

## 2022-07-18 RX ORDER — METOPROLOL SUCCINATE 50 MG/1
50 TABLET, EXTENDED RELEASE ORAL DAILY
Qty: 90 TABLET | Refills: 3 | Status: SHIPPED | OUTPATIENT
Start: 2022-07-18 | End: 2023-06-24

## 2022-07-18 RX ORDER — AMLODIPINE BESYLATE 10 MG/1
10 TABLET ORAL DAILY
Qty: 90 TABLET | Refills: 3 | Status: SHIPPED | OUTPATIENT
Start: 2022-07-18 | End: 2023-06-24

## 2022-07-18 NOTE — TELEPHONE ENCOUNTER
----- Message from Ila Jay sent at 7/18/2022  8:17 AM CDT -----  Regarding: self [phone]  Type: RX Refill Request    Who Called:  self    Have you contacted your pharmacy: yes    Refill or New Rx: refill     RX Name and Strength:levothyroxine (SYNTHROID) 25 MCG tablet ,oxybutynin (DITROPAN-XL) 10 MG 24 hr tablet, amLODIPine (NORVASC) 10 MG tablet, stomach meds (not sure the name),metoprolol succinate (TOPROL-XL) 50 MG 24 hr tablet, montelukast (SINGULAIR) 10 mg tablet and atorvastatin (LIPITOR) 40 MG tablet      Preferred Pharmacy with phone number:   [facility]  ([facility]) - [facility] [address]   [address]  [facility] [address]  [facility] [address]  Phone: [phone] Fax: [phone]    Local or Mail Order: mail    Would the patient rather a call back or a response via [facility]?  Call back    Best Call Back Number: [phone]

## 2022-07-18 NOTE — TELEPHONE ENCOUNTER
No new care gaps identified.  [facility] Embedded Care Gaps. Reference number: [id number]. 7/18/2022   8:30:25 AM CDT

## 2022-09-19 ENCOUNTER — PES CALL (OUTPATIENT)
Dept: ADMINISTRATIVE | Facility: CLINIC | Age: 82
End: 2022-09-19
Payer: MEDICARE

## 2022-10-24 ENCOUNTER — OFFICE VISIT (OUTPATIENT)
Dept: FAMILY MEDICINE | Facility: CLINIC | Age: 82
End: 2022-10-24
Payer: MEDICARE

## 2022-10-24 VITALS
HEART RATE: 100 BPM | TEMPERATURE: 98 F | SYSTOLIC BLOOD PRESSURE: 120 MMHG | HEIGHT: 62 IN | OXYGEN SATURATION: 97 % | BODY MASS INDEX: 27.6 KG/M2 | WEIGHT: 150 LBS | DIASTOLIC BLOOD PRESSURE: 70 MMHG

## 2022-10-24 DIAGNOSIS — Z23 FLU VACCINE NEED: Primary | ICD-10-CM

## 2022-10-24 DIAGNOSIS — J44.9 CHRONIC OBSTRUCTIVE PULMONARY DISEASE, UNSPECIFIED COPD TYPE: Chronic | ICD-10-CM

## 2022-10-24 DIAGNOSIS — S21.202A WOUND OF LEFT SIDE OF BACK, INITIAL ENCOUNTER: ICD-10-CM

## 2022-10-24 DIAGNOSIS — I89.0 LYMPHEDEMA: ICD-10-CM

## 2022-10-24 DIAGNOSIS — R54 AGE-RELATED PHYSICAL DEBILITY: ICD-10-CM

## 2022-10-24 DIAGNOSIS — I10 ESSENTIAL HYPERTENSION: Chronic | ICD-10-CM

## 2022-10-24 DIAGNOSIS — E21.1 HYPERPARATHYROIDISM , SECONDARY, NON-RENAL: ICD-10-CM

## 2022-10-24 DIAGNOSIS — E78.5 HYPERLIPIDEMIA, UNSPECIFIED HYPERLIPIDEMIA TYPE: Chronic | ICD-10-CM

## 2022-10-24 DIAGNOSIS — E03.9 HYPOTHYROIDISM, UNSPECIFIED TYPE: Chronic | ICD-10-CM

## 2022-10-24 PROCEDURE — 1101F PT FALLS ASSESS-DOCD LE1/YR: CPT | Mod: CPTII,S$GLB,, | Performed by: FAMILY MEDICINE

## 2022-10-24 PROCEDURE — 3074F SYST BP LT 130 MM HG: CPT | Mod: CPTII,S$GLB,, | Performed by: FAMILY MEDICINE

## 2022-10-24 PROCEDURE — 99499 UNLISTED E&M SERVICE: CPT | Mod: S$GLB,,, | Performed by: FAMILY MEDICINE

## 2022-10-24 PROCEDURE — 1101F PR PT FALLS ASSESS DOC 0-1 FALLS W/OUT INJ PAST YR: ICD-10-PCS | Mod: CPTII,S$GLB,, | Performed by: FAMILY MEDICINE

## 2022-10-24 PROCEDURE — 1159F MED LIST DOCD IN RCRD: CPT | Mod: CPTII,S$GLB,, | Performed by: FAMILY MEDICINE

## 2022-10-24 PROCEDURE — 99499 RISK ADDL DX/OHS AUDIT: ICD-10-PCS | Mod: S$GLB,,, | Performed by: FAMILY MEDICINE

## 2022-10-24 PROCEDURE — 1125F PR PAIN SEVERITY QUANTIFIED, PAIN PRESENT: ICD-10-PCS | Mod: CPTII,S$GLB,, | Performed by: FAMILY MEDICINE

## 2022-10-24 PROCEDURE — 3078F DIAST BP <80 MM HG: CPT | Mod: CPTII,S$GLB,, | Performed by: FAMILY MEDICINE

## 2022-10-24 PROCEDURE — 3078F PR MOST RECENT DIASTOLIC BLOOD PRESSURE < 80 MM HG: ICD-10-PCS | Mod: CPTII,S$GLB,, | Performed by: FAMILY MEDICINE

## 2022-10-24 PROCEDURE — 99999 PR PBB SHADOW E&M-EST. PATIENT-LVL V: CPT | Mod: PBBFAC,,, | Performed by: FAMILY MEDICINE

## 2022-10-24 PROCEDURE — 90694 FLU VACCINE - QUADRIVALENT - ADJUVANTED: ICD-10-PCS | Mod: S$GLB,,, | Performed by: FAMILY MEDICINE

## 2022-10-24 PROCEDURE — 87077 CULTURE AEROBIC IDENTIFY: CPT | Performed by: FAMILY MEDICINE

## 2022-10-24 PROCEDURE — 3288F PR FALLS RISK ASSESSMENT DOCUMENTED: ICD-10-PCS | Mod: CPTII,S$GLB,, | Performed by: FAMILY MEDICINE

## 2022-10-24 PROCEDURE — 3288F FALL RISK ASSESSMENT DOCD: CPT | Mod: CPTII,S$GLB,, | Performed by: FAMILY MEDICINE

## 2022-10-24 PROCEDURE — 99999 PR PBB SHADOW E&M-EST. PATIENT-LVL V: ICD-10-PCS | Mod: PBBFAC,,, | Performed by: FAMILY MEDICINE

## 2022-10-24 PROCEDURE — 87186 SC STD MICRODIL/AGAR DIL: CPT | Performed by: FAMILY MEDICINE

## 2022-10-24 PROCEDURE — 3074F PR MOST RECENT SYSTOLIC BLOOD PRESSURE < 130 MM HG: ICD-10-PCS | Mod: CPTII,S$GLB,, | Performed by: FAMILY MEDICINE

## 2022-10-24 PROCEDURE — 1159F PR MEDICATION LIST DOCUMENTED IN MEDICAL RECORD: ICD-10-PCS | Mod: CPTII,S$GLB,, | Performed by: FAMILY MEDICINE

## 2022-10-24 PROCEDURE — 99214 OFFICE O/P EST MOD 30 MIN: CPT | Mod: 25,S$GLB,, | Performed by: FAMILY MEDICINE

## 2022-10-24 PROCEDURE — 90694 VACC AIIV4 NO PRSRV 0.5ML IM: CPT | Mod: S$GLB,,, | Performed by: FAMILY MEDICINE

## 2022-10-24 PROCEDURE — 1160F RVW MEDS BY RX/DR IN RCRD: CPT | Mod: CPTII,S$GLB,, | Performed by: FAMILY MEDICINE

## 2022-10-24 PROCEDURE — 1160F PR REVIEW ALL MEDS BY PRESCRIBER/CLIN PHARMACIST DOCUMENTED: ICD-10-PCS | Mod: CPTII,S$GLB,, | Performed by: FAMILY MEDICINE

## 2022-10-24 PROCEDURE — 1125F AMNT PAIN NOTED PAIN PRSNT: CPT | Mod: CPTII,S$GLB,, | Performed by: FAMILY MEDICINE

## 2022-10-24 PROCEDURE — 87070 CULTURE OTHR SPECIMN AEROBIC: CPT | Performed by: FAMILY MEDICINE

## 2022-10-24 PROCEDURE — G0008 FLU VACCINE - QUADRIVALENT - ADJUVANTED: ICD-10-PCS | Mod: S$GLB,,, | Performed by: FAMILY MEDICINE

## 2022-10-24 PROCEDURE — G0008 ADMIN INFLUENZA VIRUS VAC: HCPCS | Mod: S$GLB,,, | Performed by: FAMILY MEDICINE

## 2022-10-24 PROCEDURE — 99214 PR OFFICE/OUTPT VISIT, EST, LEVL IV, 30-39 MIN: ICD-10-PCS | Mod: 25,S$GLB,, | Performed by: FAMILY MEDICINE

## 2022-10-24 RX ORDER — AMOXICILLIN AND CLAVULANATE POTASSIUM 875; 125 MG/1; MG/1
1 TABLET, FILM COATED ORAL 2 TIMES DAILY
Qty: 20 TABLET | Refills: 0 | Status: SHIPPED | OUTPATIENT
Start: 2022-10-24 | End: 2022-11-10 | Stop reason: ALTCHOICE

## 2022-10-24 NOTE — PROGRESS NOTES
Chief Complaint  Chief Complaint   Patient presents with    Follow-up       HPI  Ivelisse Kern is a 82 y.o. female with multiple medical diagnoses as listed in the medical history and problem list that presents for follow-up visit.  Their last appointment with primary care was Visit date not found.      Patient reports she has arthritis in most joints that is painful but she takes 1 tramadol each morning which usually helps resolve her pain for the rest of the day.     She reports losing 20 lbs since April 2022. She states she is less hungry now and is not eating as much as she used to.     Patient states she has an open wound on her upper L back that randomly appeared a few months ago and won't heal. She states she does not sleep on her back or or supine and does not usually put pressure on the area. She does not recall trauma to the area or bug bite.     She ambulates with her walker at home. She states has difficulty getting to the bathroom with her walker sometimes and is afraid of falling.     She states she gets a headache at bottom of the back of her head at night but she thinks it might be because she strains her neck to watch TV on her laptop.     She reports spending most of her time at home and states she doesn't like to go out and do things. She lives at home alone but her sister lives <1 mile away and brings her food and helps take care of her. She also has a good friend and neighbor that helps her out when she needs it.     She is otherwise doing well and has no other acute complaints.     PAST MEDICAL HISTORY:  Past Medical History:   Diagnosis Date    AR (allergic rhinitis)     Arthritis     Asthma     Breast cancer     COPD (chronic obstructive pulmonary disease)     GERD (gastroesophageal reflux disease)     Hyperlipidemia     Hypertension     Hypothyroidism     Osteoporosis        PAST SURGICAL HISTORY:  Past Surgical History:   Procedure Laterality Date    APPENDECTOMY      BREAST BIOPSY Right  4/15/2014    BREAST LUMPECTOMY      OVARIAN CYST REMOVAL      TONSILLECTOMY         SOCIAL HISTORY:  Social History     Socioeconomic History    Marital status: Single   Tobacco Use    Smoking status: Never    Smokeless tobacco: Never   Substance and Sexual Activity    Alcohol use: No    Drug use: No    Sexual activity: Not Currently       FAMILY HISTORY:  Family History   Problem Relation Age of Onset    Cancer Brother         lung       ALLERGIES AND MEDICATIONS: updated and reviewed.  Review of patient's allergies indicates:   Allergen Reactions    Codeine Other (See Comments)     Dizziness     Current Outpatient Medications   Medication Sig Dispense Refill    albuterol (PROAIR HFA) 90 mcg/actuation inhaler Inhale 1 puff into the lungs every 6 (six) hours as needed for Wheezing. 25.5 g 1    allopurinoL (ZYLOPRIM) 100 MG tablet TAKE 1 TABLET BY MOUTH ONCE DAILY 90 tablet 3    amLODIPine (NORVASC) 10 MG tablet Take 1 tablet (10 mg total) by mouth once daily. 90 tablet 3    atorvastatin (LIPITOR) 40 MG tablet Take 1 tablet (40 mg total) by mouth once daily. 90 tablet 1    cholecalciferol, vitamin D3, 400 unit Cap Take 2 capsules (800 Units total) by mouth once daily. 180 capsule 1    fluticasone (FLONASE) 50 mcg/actuation nasal spray 1 spray (50 mcg total) by Each Nare route once daily. 48 g 1    levothyroxine (SYNTHROID) 25 MCG tablet TAKE 1 TABLET BY MOUTH IN  THE MORNING 90 tablet 3    metoprolol succinate (TOPROL-XL) 50 MG 24 hr tablet Take 1 tablet (50 mg total) by mouth once daily. 90 tablet 3    montelukast (SINGULAIR) 10 mg tablet Take 1 tablet (10 mg total) by mouth every evening. 90 tablet 3    omeprazole (PRILOSEC) 20 MG capsule TAKE 1 CAPSULE BY MOUTH  DAILY 90 capsule 3    oxybutynin (DITROPAN-XL) 10 MG 24 hr tablet Take 1 tablet (10 mg total) by mouth once daily. 90 tablet 3    tramadol (ULTRAM) 50 mg tablet TAKE 1 TABLET BY MOUTH EVERY 8 HOURS AS NEEDED 90 tablet 0     No current facility-administered  "medications for this visit.         ROS  Review of Systems   Constitutional:  Positive for appetite change and unexpected weight change.   HENT: Negative.     Eyes: Negative.    Respiratory: Negative.     Cardiovascular:  Positive for leg swelling.   Gastrointestinal: Negative.    Endocrine: Negative.    Genitourinary: Negative.    Musculoskeletal:  Positive for arthralgias.   Skin:  Positive for wound.   Allergic/Immunologic: Negative.    Neurological: Negative.    Hematological: Negative.    Psychiatric/Behavioral: Negative.           Physical Exam  Vitals:    10/24/22 1045   BP: 120/70   BP Location: Left arm   Patient Position: Sitting   BP Method: Medium (Manual)   Pulse: 100   Temp: 98 °F (36.7 °C)   TempSrc: Oral   SpO2: 97%   Weight: 68 kg (150 lb)   Height: 5' 2" (1.575 m)    Body mass index is 27.44 kg/m².  Weight: 68 kg (150 lb)   Height: 5' 2" (157.5 cm)       Physical Exam  Constitutional:       Appearance: Normal appearance.   HENT:      Head: Normocephalic and atraumatic.      Mouth/Throat:      Mouth: Mucous membranes are moist.      Pharynx: Oropharynx is clear.   Eyes:      Extraocular Movements: Extraocular movements intact.      Conjunctiva/sclera: Conjunctivae normal.      Pupils: Pupils are equal, round, and reactive to light.   Cardiovascular:      Rate and Rhythm: Normal rate and regular rhythm.      Pulses: Normal pulses.      Heart sounds: Normal heart sounds.      Comments: 2+ pitting edema in LLE to level of shin with severe diffuse skin crusting and darkening.   1+ pitting edema in RLE to level of ankle with mild skin crusting and darkening.   Pulmonary:      Effort: Pulmonary effort is normal.      Breath sounds: Normal breath sounds.   Abdominal:      General: Abdomen is flat.      Palpations: Abdomen is soft.   Skin:     General: Skin is warm and dry.      Capillary Refill: Capillary refill takes less than 2 seconds.      Comments: 5-6 mm open wound on L upper back/shoulder region. " Mixed serous and purulent drainage. Erythematous with sticky yellow discharge. Malodorous.    Neurological:      General: No focal deficit present.      Mental Status: She is alert and oriented to person, place, and time.   Psychiatric:         Mood and Affect: Mood normal.         Behavior: Behavior normal.             Health Maintenance         Date Due Completion Date    Shingles Vaccine (3 of 3) 06/17/2022 4/22/2022    COVID-19 Vaccine (4 - Booster for Pfizer series) 07/15/2022 5/20/2022    Influenza Vaccine (1) 09/01/2022 9/27/2019    Lipid Panel 04/19/2023 4/19/2022    DEXA Scan 12/16/2023 12/16/2021    Override on 1/7/2011: Done (osteoporosis)              Assessment and Plan:  Ivelisse was seen today for follow-up.    Diagnoses and all orders for this visit:    Flu vaccine need  -     Influenza - Quadrivalent (Adjuvanted)    Chronic obstructive pulmonary disease, unspecified COPD type  Patient has trouble ambulating and leaving home. No SOB at rest or with exertion.   Referred patient to [facility]    Essential hypertension  Patient condition currently well managed.   Ordered CBC, CMP, Lipid panel, prealbumin, and TSH. Will follow-up and contact patient with results.     Hyperlipidemia, unspecified hyperlipidemia type  Lipid panel ordered. Will follow-up and contact patient with results.     Lymphedema  Patient with pitting edema and skin changes on bilateral LE. No pain or itching.   Patient has trouble ambulating and leaving home  Referred patient to [facility]; Future    Age-related physical debility  Patient has trouble ambulating and leaving home. Sister brings her meals daily. Otherwise able to complete her ADLs.   Referred patient to [facility]; Future    Hyperparathyroidism , secondary, non-renal  Patient PTH ordered. Will follow-up.     Hypothyroidism, unspecified type  Patient TSH ordered. Will follow-up.      Wound of left side of back, initial encounter  Patient with open wound to L upper back. Purulent, malodorous and erythematous on exam.   Wound cultured. Will follow-up results and  accordingly.   Patient referred to [facility] for further management.   Started patient on Augmentin for bacterial coverage while awaiting culture results.       I attest that I have reviewed the student note and that the components of the history of the present illness, the physical exam, and the assessment and plan documented were performed by me or were performed in my presence by the student where I verified the documentation and performed (or re-performed) the exam and medical decision making.

## 2022-10-27 ENCOUNTER — PES CALL (OUTPATIENT)
Dept: ADMINISTRATIVE | Facility: CLINIC | Age: 82
End: 2022-10-27
Payer: MEDICARE

## 2022-10-28 LAB — BACTERIA SPEC AEROBE CULT: ABNORMAL

## 2022-10-31 ENCOUNTER — TELEPHONE (OUTPATIENT)
Dept: FAMILY MEDICINE | Facility: CLINIC | Age: 82
End: 2022-10-31
Payer: MEDICARE

## 2022-10-31 NOTE — TELEPHONE ENCOUNTER
----- Message from Mile Young MD sent at 10/31/2022  7:54 AM CDT -----  Correct antibiotic written

## 2022-11-08 ENCOUNTER — CARE AT HOME (OUTPATIENT)
Dept: HOME HEALTH SERVICES | Facility: CLINIC | Age: 82
End: 2022-11-08
Payer: MEDICARE

## 2022-11-08 DIAGNOSIS — I50.32 CHRONIC DIASTOLIC CONGESTIVE HEART FAILURE: ICD-10-CM

## 2022-11-08 DIAGNOSIS — R54 AGE-RELATED PHYSICAL DEBILITY: ICD-10-CM

## 2022-11-08 DIAGNOSIS — I89.0 LYMPHEDEMA: ICD-10-CM

## 2022-11-08 DIAGNOSIS — J44.9 CHRONIC OBSTRUCTIVE PULMONARY DISEASE, UNSPECIFIED COPD TYPE: Chronic | ICD-10-CM

## 2022-11-08 PROCEDURE — 99349 PR HOME VISIT,ESTAB PATIENT,LEVEL III: ICD-10-PCS | Mod: S$GLB,,, | Performed by: NURSE PRACTITIONER

## 2022-11-08 PROCEDURE — 99349 HOME/RES VST EST MOD MDM 40: CPT | Mod: S$GLB,,, | Performed by: NURSE PRACTITIONER

## 2022-11-08 NOTE — PROGRESS NOTES
[facility] Visit    Visit Date: 11/10/2022  Encounter Provider: Eve Lanier  PCP:  Mile Young MD    Subjective:      Patient ID: Ivelisse Kern is a 82 y.o. female.    Consult Requested By:  Dr. Mile Young  Reason for Consult:  Establish Home based Care    Ivelisse is being seen at home due to being seen at home due to physical debility that presents a taxing effort to leave the home, to mitigate high risk of hospital readmission and/or due to the limited availability of reliable or safe options for transportation to the point of access to the provider. Prior to treatment on this visit the chart was reviewed and patient verbal consent was obtained.    Chief Complaint: Establish Care, Wound Check, and Edema      Pt was referred by by her PCP for home based care as she has transportation difficulties.    She is found in her home, AAOX[place], denies any new problems today, reports she is in her usual state of health today.  She has chronic lymphedema, reports she always swollen. She does not elevate her legs. She sleeps on her couch in a sitting position. She understands she needs to elevate but doesn't.  She has wraps for her edema but the straps are broken and they will not stay on.  She is compliant with her medications.  She had a wound at her most recent appt. She has completed her antibiotics and her sister is taking her to wound care appt next week.    She lives alone, her sister and her nephew assist her.  She can perform her own ADLs.         Review of Systems   Constitutional:  Negative for activity change, fatigue and fever.   HENT: Negative.     Eyes: Negative.    Respiratory:  Negative for chest tightness.    Cardiovascular:  Positive for leg swelling.   Gastrointestinal: Negative.    Endocrine: Negative.    Genitourinary: Negative.    Musculoskeletal:  Positive for gait problem.   Skin: Negative.    Allergic/Immunologic: Negative.    Hematological: Negative.    Psychiatric/Behavioral:  Negative.  Negative for agitation.    All other systems reviewed and are negative.    Assessments:  Environmental: single family home, steps to enter  Functional Status: independent  Safety: fall risk  Nutritional: adequate  Home Health/DME/Supplies: none    Objective:   Physical Exam  Vitals reviewed.   Constitutional:       General: She is not in acute distress.     Appearance: She is well-developed.   HENT:      Head: Normocephalic and atraumatic.      Nose: Nose normal.      Mouth/Throat:      Mouth: Mucous membranes are dry.   Eyes:      Pupils: Pupils are equal, round, and reactive to light.   Cardiovascular:      Rate and Rhythm: Normal rate and regular rhythm.      Heart sounds: Normal heart sounds.   Pulmonary:      Effort: Pulmonary effort is normal.      Breath sounds: Normal breath sounds.   Abdominal:      General: Bowel sounds are normal.      Palpations: Abdomen is soft.   Musculoskeletal:         General: Normal range of motion.      Cervical back: Normal range of motion and neck supple.      Right lower leg: Edema present.      Left lower leg: Edema present.   Skin:     General: Skin is warm and dry.   Neurological:      Mental Status: She is alert and oriented to person, place, and time.      Cranial Nerves: No cranial nerve deficit.   Psychiatric:         Behavior: Behavior normal.         Thought Content: Thought content normal.         Judgment: Judgment normal.       Vitals:    11/09/22 1300   BP: 124/62   Pulse: 62   Resp: 18   SpO2: 98%     There is no height or weight on file to calculate BMI.    Assessment:     1. Chronic obstructive pulmonary disease, unspecified COPD type    2. Lymphedema    3. Age-related physical debility    4. Chronic diastolic congestive heart failure        Plan:            Problem List Items Addressed This Visit          Pulmonary    COPD (chronic obstructive pulmonary disease) (Chronic)    Current Assessment & Plan     Stable at baseline  Albuterol in  place  Monitor            Cardiac/Vascular    Chronic diastolic congestive heart failure    Overview     Grade 1 diastolic dysfunction on Echo in 2020         Current Assessment & Plan     Edema to lower legs  Denies SOB  Beta blocker in place  Low salt diet  Monitor weight  Continue current plan            Other    Lymphedema    Current Assessment & Plan     Chronic  Pt never elevates legs reinforced importance  Lymphedema wraps are broken  Referral to Lymphedema clinic         Relevant Orders    Ambulatory referral/consult to Physical/Occupational Therapy     Other Visit Diagnoses       Age-related physical debility                 Were controlled substances prescribed?  No  Total visit time: 45 min  Follow Up Appointments:   Future Appointments   Date Time Provider Department Center   11/21/2022  9:00 AM Celestine Mcintyre MD [facility]   4/17/2023  8:25 AM LAB, [facility]   4/24/2023 10:40 AM [facility]       Signature: Eve Lanier NP

## 2022-11-09 VITALS
DIASTOLIC BLOOD PRESSURE: 62 MMHG | SYSTOLIC BLOOD PRESSURE: 124 MMHG | RESPIRATION RATE: 18 BRPM | OXYGEN SATURATION: 98 % | HEART RATE: 62 BPM

## 2022-11-10 PROBLEM — I89.0 LYMPHEDEMA: Status: ACTIVE | Noted: 2022-11-10

## 2022-11-10 NOTE — ASSESSMENT & PLAN NOTE
Chronic  Pt never elevates legs reinforced importance  Lymphedema wraps are broken  Referral to Lymphedema clinic  
Edema to lower legs  Denies SOB  Beta blocker in place  Low salt diet  Monitor weight  Continue current plan  
Stable at baseline  Albuterol in place  Monitor  
N/A

## 2022-11-21 ENCOUNTER — HOSPITAL ENCOUNTER (OUTPATIENT)
Dept: WOUND CARE | Facility: HOSPITAL | Age: 82
Discharge: HOME | End: 2022-11-21
Attending: FAMILY MEDICINE
Payer: MEDICARE

## 2022-11-21 VITALS
RESPIRATION RATE: 20 BRPM | SYSTOLIC BLOOD PRESSURE: 135 MMHG | BODY MASS INDEX: 27.6 KG/M2 | DIASTOLIC BLOOD PRESSURE: 72 MMHG | TEMPERATURE: 97 F | WEIGHT: 150 LBS | HEIGHT: 62 IN | HEART RATE: 116 BPM

## 2022-11-21 DIAGNOSIS — L98.429 CHRONIC ULCER OF BACK: Primary | ICD-10-CM

## 2022-11-21 DIAGNOSIS — S21.202A WOUND OF LEFT SIDE OF BACK, INITIAL ENCOUNTER: ICD-10-CM

## 2022-11-21 PROCEDURE — 99214 PR OFFICE/OUTPT VISIT, EST, LEVL IV, 30-39 MIN: ICD-10-PCS | Mod: ,,, | Performed by: INTERNAL MEDICINE

## 2022-11-21 PROCEDURE — 88305 TISSUE EXAM BY PATHOLOGIST: CPT | Mod: 26,,, | Performed by: PATHOLOGY

## 2022-11-21 PROCEDURE — 88305 TISSUE EXAM BY PATHOLOGIST: ICD-10-PCS | Mod: 26,,, | Performed by: PATHOLOGY

## 2022-11-21 PROCEDURE — 99214 OFFICE O/P EST MOD 30 MIN: CPT | Mod: ,,, | Performed by: INTERNAL MEDICINE

## 2022-11-21 PROCEDURE — 11106 INCAL BX SKN SINGLE LES: CPT | Performed by: INTERNAL MEDICINE

## 2022-11-21 PROCEDURE — 88305 TISSUE EXAM BY PATHOLOGIST: CPT | Performed by: PATHOLOGY

## 2022-11-21 NOTE — PROGRESS NOTES
[facility]                Progress Note    Subjective:       Patient ID: Ivelisse Kern is a 82 y.o. female.    Chief Complaint: Wound Check    To clinic in w/c pushed by staff. No complaints pain in wound. Has hx CHF Breast Ca and Asthma and no DM in family. No hx skin disorders. States arm wound which is pink and raised has was noticed about a month ago. Upper left back has raised friable area that she states has been itching. Was on abx and has now completed course. Has had sister clean area and applying Neosporin to areas daily. Bx, punch, taken  from upper back and sent for analysis. Silver nitrate flattened areas and back wound bleeding resolved with pressure held. Tolerated procedure well. Will return in one week.    Wound to upper left back apnd posterior left arm. No pain sister noted left back approximately three months ago has been treated with oral antibiotics using neosproin to back and arm with gauze covering changing every other day scant drainage no persona history of skin cancers no fevers/chills    Review of Systems      Objective:        Physical Exam  Constitutional:       General: She is not in acute distress.  HENT:      Head: Normocephalic and atraumatic.   Pulmonary:      Effort: Pulmonary effort is normal. No respiratory distress.   Skin:     Comments: Left posterior arm shows 6mm pink raised (?hypergranulated) lesion without periwound erythema or induration,    Left upper back similar lesion raised hypergranulated without ulceration or periwound induration see biopsy note   Neurological:      Mental Status: She is alert.   Psychiatric:         Mood and Affect: Mood normal.         Behavior: Behavior normal.       Vitals:    11/21/22 0908   BP: 135/72   Pulse: (!) 116   Resp: 20   Temp: 96.7 °F (35.9 °C)       Assessment:           ICD-10-CM ICD-9-CM   1. Chronic ulcer of back  L98.429 707.8   2. Wound of left side of back, initial encounter   S21.202A 876.0            Altered Skin Integrity 11/21/22 1019 Left lateral Antecubital (Active)   11/21/22 1019   Altered Skin Integrity Present on Admission: yes   Side: Left   Orientation: lateral   Location: Antecubital   Wound Number:    Is this injury device related?:    Primary Wound Type:    Description of Altered Skin Integrity:    Ankle-Brachial Index:    Pulses:    Removal Indication and Assessment:    Wound Outcome:    (Retired) Wound Length (cm):    (Retired) Wound Width (cm):    (Retired) Depth (cm):    Wound Description (Comments):    Removal Indications:    Wound Image   11/21/22 1019   Description of Altered Skin Integrity Partial thickness tissue loss. Shallow open ulcer with a red or pink wound bed, without slough. Intact or Open/Ruptured Serum-filled blister. 11/21/22 1019   Dressing Appearance Intact 11/21/22 1019   Drainage Amount Scant 11/21/22 1019   Drainage Characteristics/Odor Serous 11/21/22 1019   Appearance Red;Hypergranulation 11/21/22 1019   Tissue loss description Partial thickness 11/21/22 1019   Red (%), Wound Tissue Color 100 % 11/21/22 1019   Periwound Area Dry;Intact 11/21/22 1019   Wound Edges Defined 11/21/22 1019   Wound Length (cm) 1.3 cm 11/21/22 1019   Wound Width (cm) 1.3 cm 11/21/22 1019   Wound Surface Area (cm^2) 1.69 cm^2 11/21/22 1019   Care Cleansed with:;Antimicrobial agent;Sterile normal saline 11/21/22 1019   Dressing Changed 11/21/22 1019   Dressing Change Due 11/28/22 11/21/22 1019            Altered Skin Integrity 11/21/22 1022 Left upper Back (Active)   11/21/22 1022   Altered Skin Integrity Present on Admission: yes   Side: Left   Orientation: upper   Location: Back   Wound Number:    Is this injury device related?:    Primary Wound Type:    Description of Altered Skin Integrity:    Ankle-Brachial Index:    Pulses:    Removal Indication and Assessment:    Wound Outcome:    (Retired) Wound Length (cm):    (Retired) Wound Width (cm):    (Retired) Depth (cm):     Wound Description (Comments):    Removal Indications:    Wound Image   11/21/22 1019   Description of Altered Skin Integrity Partial thickness tissue loss. Shallow open ulcer with a red or pink wound bed, without slough. Intact or Open/Ruptured Serum-filled blister. 11/21/22 1019   Dressing Appearance Dry;Intact 11/21/22 1019   Drainage Amount Scant 11/21/22 1019   Drainage Characteristics/Odor Serosanguineous 11/21/22 1019   Appearance Red;Hypergranulation 11/21/22 1019   Tissue loss description Partial thickness 11/21/22 1019   Red (%), Wound Tissue Color 100 % 11/21/22 1019   Periwound Area Dry;Intact 11/21/22 1019   Wound Edges Defined 11/21/22 1019   Wound Length (cm) 1.5 cm 11/21/22 1019   Wound Width (cm) 1.7 cm 11/21/22 1019   Wound Depth (cm) 0 cm 11/21/22 1019   Wound Volume (cm^3) 0 cm^3 11/21/22 1019   Wound Surface Area (cm^2) 2.55 cm^2 11/21/22 1019   Care Cleansed with:;Antimicrobial agent;Sterile normal saline 11/21/22 1019   Dressing Changed 11/21/22 1019   Dressing Change Due 11/28/22 11/21/22 1019           Plan:        Punch biopsy taken of left upper back lesion use foam border for local compressin leave in place until following left upper arm treated with silver nitrate x 2 with clear drainage no fluctuance leave this open to air        Orders Placed This Encounter   Procedures    Ambulatory referral/consult to Wound Clinic     Standing Status:   Standing     Number of Occurrences:   1     Referral Priority:   Routine     Referral Type:   Consultation     Referral Reason:   Specialty Services Required     Requested Specialty:   Wound Care     Number of Visits Requested:   1    Change dressing     Lateral antecubital area apply band aid only.  Upper L back Benzoin to periwound and Tegafoam.        No follow-ups on file.

## 2022-11-21 NOTE — PROGRESS NOTES
[facility]                Progress Note    Subjective:       Patient ID: Ivelisse Kern is a 82 y.o. female.    Chief Complaint: Wound Check    HPI    Review of Systems      Objective:        Physical Exam    Vitals:    11/21/22 0908   BP: 135/72   Pulse: (!) 116   Resp: 20   Temp: 96.7 °F (35.9 °C)       Assessment:           ICD-10-CM ICD-9-CM   1. Chronic ulcer of back  L98.429 707.8   2. Wound of left side of back, initial encounter  S21.202A 876.0                Plan:                Orders Placed This Encounter   Procedures    Ambulatory referral/consult to Wound Clinic     Standing Status:   Standing     Number of Occurrences:   1     Referral Priority:   Routine     Referral Type:   Consultation     Referral Reason:   Specialty Services Required     Requested Specialty:   Wound Care     Number of Visits Requested:   1        No follow-ups on file.

## 2022-11-21 NOTE — PROCEDURES
Excision of Lesion    Date/Time: 11/21/2022 8:23 AM  Performed by: Celestine Mcintyre MD  Authorized by: Celestine Mcintyre MD     Consent Done?:  Yes (Written)  Timeout: prior to procedure the correct patient, procedure, and site was verified    Local anesthesia used?: Yes    Anesthesia:  Local infiltration  Local anesthetic:  Lidocaine 1% with epinephrine  Anesthetic total (ml):  2  Assistants?: No    : hypergranulated lesion present > 3 months.  Body area:  Back / flank  Laterality:  Left  Position:  Supine  Anesthesia:  Local infiltration  Local anesthetic:  Lidocaine 1% with epinephrine  Excision type:  Skin  Excision size (cm):  0.8  Incision type:  Other (comments) (8mm punch biopsy used taking from 5 oclock position to include periwound skin)  Specimens?: Yes     Specimens submitted to pathology.   Hemostasis was obtained.  Estimated blood loss (cc):  3   Patient tolerated the procedure well with no immediate complications.   Patient was discharged and will follow up for wound check and pathology results.  Comments:      Hemostasis achieved with single silver nitrate stick and direct pressure

## 2022-11-28 ENCOUNTER — HOSPITAL ENCOUNTER (OUTPATIENT)
Dept: WOUND CARE | Facility: HOSPITAL | Age: 82
Discharge: HOME | End: 2022-11-28
Attending: INTERNAL MEDICINE
Payer: MEDICARE

## 2022-11-28 VITALS — TEMPERATURE: 98 F | SYSTOLIC BLOOD PRESSURE: 122 MMHG | HEART RATE: 126 BPM | DIASTOLIC BLOOD PRESSURE: 78 MMHG

## 2022-11-28 DIAGNOSIS — L98.429 CHRONIC ULCER OF BACK: ICD-10-CM

## 2022-11-28 DIAGNOSIS — S21.202A WOUND OF LEFT SIDE OF BACK, INITIAL ENCOUNTER: Primary | ICD-10-CM

## 2022-11-28 PROCEDURE — 99214 OFFICE O/P EST MOD 30 MIN: CPT | Mod: ,,, | Performed by: INTERNAL MEDICINE

## 2022-11-28 PROCEDURE — 17250 CHEM CAUT OF GRANLTJ TISSUE: CPT | Performed by: INTERNAL MEDICINE

## 2022-11-28 PROCEDURE — 99214 PR OFFICE/OUTPT VISIT, EST, LEVL IV, 30-39 MIN: ICD-10-PCS | Mod: ,,, | Performed by: INTERNAL MEDICINE

## 2022-11-28 NOTE — PROGRESS NOTES
"[facility]                Progress Note    Subjective:       Patient ID: Ivelisse Kern is a 82 y.o. female.    Chief Complaint: Wound Check    Follow up wound care visit. Patient brought to exam room in wheelchair, then ambulates to exam chair with 1 person full assist. No c/o pain at present. Denies fever, chills or flu-like symptoms at present. Dressing intact to Left Upper back with a moderate amount of dried serosanguineous, non-malodor drainage. Patient applied band-aid to Left Posterior Elbow "this morning", has a scant amount of sanguineous drainage. Left Upper Back wound measuring the same as last visit. Left Lateral Antecubital wound measuring smaller in length and in width. Topical 5% lidocaine applied to wound beds and allowed to absorb for 15 minutes before  MD applied Silver Nitrate to both.     Wound care done as per order. Return to clinic in 1 week unless MD recommends patient to be seen by Dermatology pending pathology result.    No pain from previous biopsy dressing to back remained in place the full week. Pathology report is still pending arm did not drain any more since last visit has not been using any occlusive dressing to arm      Review of Systems      Objective:        Physical Exam  Constitutional:       General: She is not in acute distress.  Eyes:      General: No scleral icterus.  Pulmonary:      Effort: Pulmonary effort is normal. No respiratory distress.   Skin:     Comments: Left posterior arm hypergranulated lesion without induration treated with silver nitrate x 2    The posterior thoracic back area has flat margin at site of prior biopsy without induration or erythema the rest of hypergranulated ulcer is unchanged treated with single silver nitrate stick to flatten to level of surrounding skin   Neurological:      Mental Status: She is alert.       Vitals:    11/28/22 1418   BP: 122/78   Pulse: (!) 126   Temp: 97.6 °F (36.4 °C) "       Assessment:           ICD-10-CM ICD-9-CM   1. Wound of left side of back, initial encounter  S21.202A 876.0   2. Chronic ulcer of back  L98.429 707.8            Altered Skin Integrity 11/21/22 1019 Left lateral Antecubital (Active)   11/21/22 1019   Altered Skin Integrity Present on Admission: yes   Side: Left   Orientation: lateral   Location: Antecubital   Wound Number:    Is this injury device related?:    Primary Wound Type:    Description of Altered Skin Integrity:    Ankle-Brachial Index:    Pulses:    Removal Indication and Assessment:    Wound Outcome:    (Retired) Wound Length (cm):    (Retired) Wound Width (cm):    (Retired) Depth (cm):    Wound Description (Comments):    Removal Indications:    Wound Image   11/28/22 1422   Description of Altered Skin Integrity Partial thickness tissue loss. Shallow open ulcer with a red or pink wound bed, without slough. Intact or Open/Ruptured Serum-filled blister. 11/28/22 1422   Dressing Appearance Intact;Moist drainage 11/28/22 1422   Drainage Amount Scant 11/28/22 1422   Drainage Characteristics/Odor Sanguineous;No odor;Bleeding controlled 11/28/22 1422   Appearance Red;Moist;Hypergranulation 11/28/22 1422   Tissue loss description Partial thickness 11/28/22 1422   Black (%), Wound Tissue Color 0 % 11/28/22 1422   Red (%), Wound Tissue Color 100 % 11/28/22 1422   Yellow (%), Wound Tissue Color 0 % 11/28/22 1422   Periwound Area Dry;Intact 11/28/22 1422   Wound Edges Defined;Open 11/28/22 1422   Wound Length (cm) 1 cm 11/28/22 1422   Wound Width (cm) 1 cm 11/28/22 1422   Wound Surface Area (cm^2) 1 cm^2 11/28/22 1422   Tunneling (depth (cm)/location) 0 11/28/22 1422   Undermining (depth (cm)/location) 0 11/28/22 1422   Care Cleansed with:;Antimicrobial agent;Sterile normal saline 11/28/22 1422   Dressing Change Due 12/05/22 11/28/22 1422            Altered Skin Integrity 11/21/22 1022 Left upper Back (Active)   11/21/22 1022   Altered Skin Integrity Present on  Admission: yes   Side: Left   Orientation: upper   Location: Back   Wound Number:    Is this injury device related?:    Primary Wound Type:    Description of Altered Skin Integrity:    Ankle-Brachial Index:    Pulses:    Removal Indication and Assessment:    Wound Outcome:    (Retired) Wound Length (cm):    (Retired) Wound Width (cm):    (Retired) Depth (cm):    Wound Description (Comments):    Removal Indications:    Wound Image    11/28/22 1422   Description of Altered Skin Integrity Partial thickness tissue loss. Shallow open ulcer with a red or pink wound bed, without slough. Intact or Open/Ruptured Serum-filled blister. 11/28/22 1422   Dressing Appearance Intact;Dried drainage 11/28/22 1422   Drainage Amount Moderate 11/28/22 1422   Drainage Characteristics/Odor Serosanguineous;No odor 11/28/22 1422   Appearance Red;White;Yellow;Moist;Bleeding;Hypergranulation 11/28/22 1422   Tissue loss description Partial thickness 11/28/22 1422   Black (%), Wound Tissue Color 0 % 11/28/22 1422   Red (%), Wound Tissue Color 80 % 11/28/22 1422   Yellow (%), Wound Tissue Color 20 % 11/28/22 1422   Periwound Area Dry;Intact 11/28/22 1422   Wound Edges Defined;Open 11/28/22 1422   Wound Length (cm) 1.5 cm 11/28/22 1422   Wound Width (cm) 1.7 cm 11/28/22 1422   Wound Depth (cm) 0.2 cm 11/28/22 1422   Wound Volume (cm^3) 0.51 cm^3 11/28/22 1422   Wound Surface Area (cm^2) 2.55 cm^2 11/28/22 1422   Tunneling (depth (cm)/location) 0 11/28/22 1422   Undermining (depth (cm)/location) 0 11/28/22 1422   Care Cleansed with:;Antimicrobial agent;Sterile normal saline 11/28/22 1422   Dressing Changed 11/28/22 1422   Periwound Care Skin barrier film applied 11/28/22 1422   Dressing Change Due 12/05/22 11/28/22 1422           Plan:          Pathology pending both areas treated with silver nitrate to address hypergranulation return in one week      Orders Placed This Encounter   Procedures    Change dressing     Clean with NS.   Left Posterior  Elbow: Left open to air.  Left Upper Back: Cavilon/Benzoin to periwound. Cover with Tegafoam.        Follow up in about 1 week (around 12/5/2022) for wound care.

## 2022-12-02 ENCOUNTER — TELEPHONE (OUTPATIENT)
Dept: FAMILY MEDICINE | Facility: CLINIC | Age: 82
End: 2022-12-02
Payer: MEDICARE

## 2022-12-02 LAB
FINAL PATHOLOGIC DIAGNOSIS: NORMAL
Lab: NORMAL

## 2022-12-02 NOTE — TELEPHONE ENCOUNTER
Patient contacted and ID confirmed by name and   Reviewed patholoogy results no atypical cells or cancer, will keep follow upiin wound clinic for next week can attempt further excision of hypergranulated tissue to promote healing at that time all questions answered

## 2022-12-05 ENCOUNTER — HOSPITAL ENCOUNTER (OUTPATIENT)
Dept: WOUND CARE | Facility: HOSPITAL | Age: 82
Discharge: HOME | End: 2022-12-05
Attending: INTERNAL MEDICINE
Payer: MEDICARE

## 2022-12-05 VITALS
TEMPERATURE: 98 F | DIASTOLIC BLOOD PRESSURE: 57 MMHG | RESPIRATION RATE: 20 BRPM | SYSTOLIC BLOOD PRESSURE: 110 MMHG | HEART RATE: 102 BPM

## 2022-12-05 DIAGNOSIS — L98.429 CHRONIC ULCER OF BACK: Primary | ICD-10-CM

## 2022-12-05 PROCEDURE — 99214 PR OFFICE/OUTPT VISIT, EST, LEVL IV, 30-39 MIN: ICD-10-PCS | Mod: ,,, | Performed by: INTERNAL MEDICINE

## 2022-12-05 PROCEDURE — 99214 OFFICE O/P EST MOD 30 MIN: CPT | Mod: ,,, | Performed by: INTERNAL MEDICINE

## 2022-12-05 PROCEDURE — 17250 CHEM CAUT OF GRANLTJ TISSUE: CPT | Performed by: INTERNAL MEDICINE

## 2022-12-05 NOTE — PROGRESS NOTES
[facility]                Progress Note    Subjective:       Patient ID: Ivelisse Kern is a 82 y.o. female.    Chief Complaint: Wound Check    F/u wound care visit. Patient ambulatory with rolling walker to exam room with no difficulty noted. Patient denies pain or discomfort at present. Wound dressing to left lateral elbow intact with small amount of dried drainage noted and wound improving measuring 0.7cm smaller length and width. Wound dressing to left upper back intact with small amount of dried drainage noted and wound measuring smaller length and width. Wound care done per order. RTC in 2 weeks.    No pain or drainage left arm much smaller left upper back some itching no bleeding tegafoam has remained in place since last visit    Review of Systems      Objective:        Physical Exam  Constitutional:       General: She is not in acute distress.  Pulmonary:      Effort: Pulmonary effort is normal. No respiratory distress.   Skin:     Comments: Left lateral elbow with only small punctate of raised pink hypergranulation tissue with intact surrounding skin a single silver nitrate stick was used to create flat plane even with surrounding skin    The left upper back area of prior punch biopsy has filled in there continues to be pink raised hypergranulation tissue to margins. Upper back treated with two silver nitrate sticks to create flat margin at wound/skin border    Neurological:      Mental Status: She is alert.       Vitals:    12/05/22 1102   BP: (!) 110/57   Pulse: 102   Resp: 20   Temp: 97.7 °F (36.5 °C)     Pathology report from 11/21/2022:   RELIAPATH DIAGNOSIS:   SKIN, UPPER LEFT BACK, BIOPSY:   -Benign skin showing ulcer and chronic inflammation.   -Negative for malignancy.   Assessment:           ICD-10-CM ICD-9-CM   1. Chronic ulcer of back  L98.429 707.8            Altered Skin Integrity 11/21/22 1019 Left lateral Antecubital (Active)   11/21/22 1019    Altered Skin Integrity Present on Admission: yes   Side: Left   Orientation: lateral   Location: Antecubital   Wound Number:    Is this injury device related?:    Primary Wound Type:    Description of Altered Skin Integrity:    Ankle-Brachial Index:    Pulses:    Removal Indication and Assessment:    Wound Outcome:    (Retired) Wound Length (cm):    (Retired) Wound Width (cm):    (Retired) Depth (cm):    Wound Description (Comments):    Removal Indications:    Wound Image   12/05/22 1103   Description of Altered Skin Integrity Partial thickness tissue loss. Shallow open ulcer with a red or pink wound bed, without slough. Intact or Open/Ruptured Serum-filled blister. 12/05/22 1103   Dressing Appearance Intact;Dried drainage 12/05/22 1103   Drainage Amount Small 12/05/22 1103   Drainage Characteristics/Odor Serosanguineous 12/05/22 1103   Appearance Red;Epithelialization 12/05/22 1103   Tissue loss description Partial thickness 12/05/22 1103   Black (%), Wound Tissue Color 0 % 12/05/22 1103   Red (%), Wound Tissue Color 100 % 12/05/22 1103   Yellow (%), Wound Tissue Color 0 % 12/05/22 1103   Periwound Area Dry;Intact;Redness 12/05/22 1103   Wound Edges Defined 12/05/22 1103   Wound Length (cm) 0.2 cm 12/05/22 1103   Wound Width (cm) 0.2 cm 12/05/22 1103   Wound Depth (cm) 0.1 cm 12/05/22 1103   Wound Volume (cm^3) 0.004 cm^3 12/05/22 1103   Wound Surface Area (cm^2) 0.04 cm^2 12/05/22 1103   Care Cleansed with:;Soap and water;Sterile normal saline 12/05/22 1103   Dressing Changed 12/05/22 1103            Altered Skin Integrity 11/21/22 1022 Left upper Back (Active)   11/21/22 1022   Altered Skin Integrity Present on Admission: yes   Side: Left   Orientation: upper   Location: Back   Wound Number:    Is this injury device related?:    Primary Wound Type:    Description of Altered Skin Integrity:    Ankle-Brachial Index:    Pulses:    Removal Indication and Assessment:    Wound Outcome:    (Retired) Wound Length (cm):     (Retired) Wound Width (cm):    (Retired) Depth (cm):    Wound Description (Comments):    Removal Indications:    Wound Image   12/05/22 1103   Description of Altered Skin Integrity Partial thickness tissue loss. Shallow open ulcer with a red or pink wound bed, without slough. Intact or Open/Ruptured Serum-filled blister. 12/05/22 1103   Dressing Appearance Intact;Dried drainage 12/05/22 1103   Drainage Amount Small 12/05/22 1103   Drainage Characteristics/Odor Serosanguineous 12/05/22 1103   Appearance Red;Hypergranulation 12/05/22 1103   Tissue loss description Partial thickness 12/05/22 1103   Black (%), Wound Tissue Color 0 % 12/05/22 1103   Red (%), Wound Tissue Color 100 % 12/05/22 1103   Yellow (%), Wound Tissue Color 0 % 12/05/22 1103   Periwound Area Dry;Intact 12/05/22 1103   Wound Edges Defined 12/05/22 1103   Wound Length (cm) 1 cm 12/05/22 1103   Wound Width (cm) 1.2 cm 12/05/22 1103   Wound Depth (cm) 0.2 cm 12/05/22 1103   Wound Volume (cm^3) 0.24 cm^3 12/05/22 1103   Wound Surface Area (cm^2) 1.2 cm^2 12/05/22 1103   Care Cleansed with:;Soap and water;Sterile normal saline 12/05/22 1103   Dressing Changed 12/05/22 1103           Plan:          Hypergranulation treated today avoid peroxide or topical antibiotic ointments keep open to air return in two weeks     Tissue pathology and/or culture taken     [] Yes      [x] No  Sharp debridement performed                   [] Yes       [x] No  Labs ordered     [] Yes       [x] No  Imaging ordered    [] Yes      [x] No    Orders Placed This Encounter   Procedures    Change dressing     Clean with NS. Left elbow: leave open to air. Left upper back: 4x4 gauze and paper tape. Patient to remove today or tomorrow.        Follow up in about 2 weeks (around 12/19/2022) for MD wound care visit.

## 2023-03-04 ENCOUNTER — DOCUMENT SCAN (OUTPATIENT)
Dept: HOME HEALTH SERVICES | Facility: HOSPITAL | Age: 83
End: 2023-03-04
Payer: MEDICARE

## 2023-03-22 ENCOUNTER — TELEPHONE (OUTPATIENT)
Dept: ADMINISTRATIVE | Facility: CLINIC | Age: 83
End: 2023-03-22
Payer: MEDICARE

## 2023-04-10 ENCOUNTER — PES CALL (OUTPATIENT)
Dept: ADMINISTRATIVE | Facility: CLINIC | Age: 83
End: 2023-04-10
Payer: MEDICARE

## 2023-04-17 ENCOUNTER — LAB VISIT (OUTPATIENT)
Dept: LAB | Facility: HOSPITAL | Age: 83
End: 2023-04-17
Attending: FAMILY MEDICINE
Payer: MEDICARE

## 2023-04-17 DIAGNOSIS — E21.1 HYPERPARATHYROIDISM , SECONDARY, NON-RENAL: ICD-10-CM

## 2023-04-17 DIAGNOSIS — I10 ESSENTIAL HYPERTENSION: Chronic | ICD-10-CM

## 2023-04-17 LAB
ALBUMIN SERPL BCP-MCNC: 3.5 G/DL (ref 3.5–5.2)
ALP SERPL-CCNC: 146 U/L (ref 55–135)
ALT SERPL W/O P-5'-P-CCNC: 7 U/L (ref 10–44)
ANION GAP SERPL CALC-SCNC: 13 MMOL/L (ref 8–16)
AST SERPL-CCNC: 14 U/L (ref 10–40)
BASOPHILS # BLD AUTO: 0.08 K/UL (ref 0–0.2)
BASOPHILS NFR BLD: 0.9 % (ref 0–1.9)
BILIRUB SERPL-MCNC: 0.3 MG/DL (ref 0.1–1)
BUN SERPL-MCNC: 18 MG/DL (ref 8–23)
CALCIUM SERPL-MCNC: 9.8 MG/DL (ref 8.7–10.5)
CHLORIDE SERPL-SCNC: 104 MMOL/L (ref 95–110)
CHOLEST SERPL-MCNC: 129 MG/DL (ref 120–199)
CHOLEST/HDLC SERPL: 4.2 {RATIO} (ref 2–5)
CO2 SERPL-SCNC: 25 MMOL/L (ref 23–29)
CREAT SERPL-MCNC: 0.7 MG/DL (ref 0.5–1.4)
DIFFERENTIAL METHOD: ABNORMAL
EOSINOPHIL # BLD AUTO: 0.2 K/UL (ref 0–0.5)
EOSINOPHIL NFR BLD: 2.2 % (ref 0–8)
ERYTHROCYTE [DISTWIDTH] IN BLOOD BY AUTOMATED COUNT: 14.4 % (ref 11.5–14.5)
EST. GFR  (NO RACE VARIABLE): >60 ML/MIN/1.73 M^2
GLUCOSE SERPL-MCNC: 99 MG/DL (ref 70–110)
HCT VFR BLD AUTO: 32.7 % (ref 37–48.5)
HDLC SERPL-MCNC: 31 MG/DL (ref 40–75)
HDLC SERPL: 24 % (ref 20–50)
HGB BLD-MCNC: 9.7 G/DL (ref 12–16)
IMM GRANULOCYTES # BLD AUTO: 0.15 K/UL (ref 0–0.04)
IMM GRANULOCYTES NFR BLD AUTO: 1.6 % (ref 0–0.5)
LDLC SERPL CALC-MCNC: 69.2 MG/DL (ref 63–159)
LYMPHOCYTES # BLD AUTO: 1.8 K/UL (ref 1–4.8)
LYMPHOCYTES NFR BLD: 19.7 % (ref 18–48)
MCH RBC QN AUTO: 26.7 PG (ref 27–31)
MCHC RBC AUTO-ENTMCNC: 29.7 G/DL (ref 32–36)
MCV RBC AUTO: 90 FL (ref 82–98)
MONOCYTES # BLD AUTO: 0.6 K/UL (ref 0.3–1)
MONOCYTES NFR BLD: 6.4 % (ref 4–15)
NEUTROPHILS # BLD AUTO: 6.4 K/UL (ref 1.8–7.7)
NEUTROPHILS NFR BLD: 69.2 % (ref 38–73)
NONHDLC SERPL-MCNC: 98 MG/DL
NRBC BLD-RTO: 0 /100 WBC
PLATELET # BLD AUTO: 610 K/UL (ref 150–450)
PMV BLD AUTO: 9.2 FL (ref 9.2–12.9)
POTASSIUM SERPL-SCNC: 3.4 MMOL/L (ref 3.5–5.1)
PREALB SERPL-MCNC: 16 MG/DL (ref 20–43)
PROT SERPL-MCNC: 7.8 G/DL (ref 6–8.4)
PTH-INTACT SERPL-MCNC: 77.8 PG/ML (ref 9–77)
RBC # BLD AUTO: 3.63 M/UL (ref 4–5.4)
SODIUM SERPL-SCNC: 142 MMOL/L (ref 136–145)
TRIGL SERPL-MCNC: 144 MG/DL (ref 30–150)
TSH SERPL DL<=0.005 MIU/L-ACNC: 2.06 UIU/ML (ref 0.4–4)
WBC # BLD AUTO: 9.29 K/UL (ref 3.9–12.7)

## 2023-04-17 PROCEDURE — 80053 COMPREHEN METABOLIC PANEL: CPT | Performed by: FAMILY MEDICINE

## 2023-04-17 PROCEDURE — 85025 COMPLETE CBC W/AUTO DIFF WBC: CPT | Performed by: FAMILY MEDICINE

## 2023-04-17 PROCEDURE — 80061 LIPID PANEL: CPT | Performed by: FAMILY MEDICINE

## 2023-04-17 PROCEDURE — 83970 ASSAY OF PARATHORMONE: CPT | Performed by: FAMILY MEDICINE

## 2023-04-17 PROCEDURE — 84134 ASSAY OF PREALBUMIN: CPT | Performed by: FAMILY MEDICINE

## 2023-04-17 PROCEDURE — 84443 ASSAY THYROID STIM HORMONE: CPT | Performed by: FAMILY MEDICINE

## 2023-04-17 PROCEDURE — 36415 COLL VENOUS BLD VENIPUNCTURE: CPT | Mod: PO | Performed by: FAMILY MEDICINE

## 2023-04-24 ENCOUNTER — OFFICE VISIT (OUTPATIENT)
Dept: FAMILY MEDICINE | Facility: CLINIC | Age: 83
End: 2023-04-24
Payer: MEDICARE

## 2023-04-24 VITALS
HEIGHT: 62 IN | DIASTOLIC BLOOD PRESSURE: 64 MMHG | SYSTOLIC BLOOD PRESSURE: 112 MMHG | TEMPERATURE: 98 F | OXYGEN SATURATION: 96 % | HEART RATE: 100 BPM | BODY MASS INDEX: 27.44 KG/M2

## 2023-04-24 DIAGNOSIS — R79.9 ABNORMAL FINDING OF BLOOD CHEMISTRY, UNSPECIFIED: ICD-10-CM

## 2023-04-24 DIAGNOSIS — N18.31 STAGE 3A CHRONIC KIDNEY DISEASE: Chronic | ICD-10-CM

## 2023-04-24 DIAGNOSIS — I50.32 CHRONIC DIASTOLIC CONGESTIVE HEART FAILURE: ICD-10-CM

## 2023-04-24 DIAGNOSIS — I10 ESSENTIAL HYPERTENSION: ICD-10-CM

## 2023-04-24 DIAGNOSIS — K21.9 GASTROESOPHAGEAL REFLUX DISEASE WITHOUT ESOPHAGITIS: ICD-10-CM

## 2023-04-24 DIAGNOSIS — E21.1 HYPERPARATHYROIDISM , SECONDARY, NON-RENAL: ICD-10-CM

## 2023-04-24 DIAGNOSIS — J44.9 CHRONIC OBSTRUCTIVE PULMONARY DISEASE, UNSPECIFIED COPD TYPE: Chronic | ICD-10-CM

## 2023-04-24 DIAGNOSIS — E46 MALNUTRITION, UNSPECIFIED TYPE: ICD-10-CM

## 2023-04-24 DIAGNOSIS — D75.839 THROMBOCYTOSIS: Primary | ICD-10-CM

## 2023-04-24 DIAGNOSIS — E03.9 HYPOTHYROIDISM, UNSPECIFIED TYPE: Chronic | ICD-10-CM

## 2023-04-24 DIAGNOSIS — E78.5 HYPERLIPIDEMIA, UNSPECIFIED HYPERLIPIDEMIA TYPE: Chronic | ICD-10-CM

## 2023-04-24 PROBLEM — L98.429: Status: RESOLVED | Noted: 2022-11-21 | Resolved: 2023-04-24

## 2023-04-24 PROCEDURE — 1125F PR PAIN SEVERITY QUANTIFIED, PAIN PRESENT: ICD-10-PCS | Mod: CPTII,S$GLB,, | Performed by: FAMILY MEDICINE

## 2023-04-24 PROCEDURE — 3078F PR MOST RECENT DIASTOLIC BLOOD PRESSURE < 80 MM HG: ICD-10-PCS | Mod: CPTII,S$GLB,, | Performed by: FAMILY MEDICINE

## 2023-04-24 PROCEDURE — 1101F PR PT FALLS ASSESS DOC 0-1 FALLS W/OUT INJ PAST YR: ICD-10-PCS | Mod: CPTII,S$GLB,, | Performed by: FAMILY MEDICINE

## 2023-04-24 PROCEDURE — 99214 PR OFFICE/OUTPT VISIT, EST, LEVL IV, 30-39 MIN: ICD-10-PCS | Mod: S$GLB,,, | Performed by: FAMILY MEDICINE

## 2023-04-24 PROCEDURE — 3074F PR MOST RECENT SYSTOLIC BLOOD PRESSURE < 130 MM HG: ICD-10-PCS | Mod: CPTII,S$GLB,, | Performed by: FAMILY MEDICINE

## 2023-04-24 PROCEDURE — 1160F RVW MEDS BY RX/DR IN RCRD: CPT | Mod: CPTII,S$GLB,, | Performed by: FAMILY MEDICINE

## 2023-04-24 PROCEDURE — 1159F MED LIST DOCD IN RCRD: CPT | Mod: CPTII,S$GLB,, | Performed by: FAMILY MEDICINE

## 2023-04-24 PROCEDURE — 99999 PR PBB SHADOW E&M-EST. PATIENT-LVL IV: CPT | Mod: PBBFAC,,, | Performed by: FAMILY MEDICINE

## 2023-04-24 PROCEDURE — 3074F SYST BP LT 130 MM HG: CPT | Mod: CPTII,S$GLB,, | Performed by: FAMILY MEDICINE

## 2023-04-24 PROCEDURE — 3288F FALL RISK ASSESSMENT DOCD: CPT | Mod: CPTII,S$GLB,, | Performed by: FAMILY MEDICINE

## 2023-04-24 PROCEDURE — 99999 PR PBB SHADOW E&M-EST. PATIENT-LVL IV: ICD-10-PCS | Mod: PBBFAC,,, | Performed by: FAMILY MEDICINE

## 2023-04-24 PROCEDURE — 3078F DIAST BP <80 MM HG: CPT | Mod: CPTII,S$GLB,, | Performed by: FAMILY MEDICINE

## 2023-04-24 PROCEDURE — 1125F AMNT PAIN NOTED PAIN PRSNT: CPT | Mod: CPTII,S$GLB,, | Performed by: FAMILY MEDICINE

## 2023-04-24 PROCEDURE — 1159F PR MEDICATION LIST DOCUMENTED IN MEDICAL RECORD: ICD-10-PCS | Mod: CPTII,S$GLB,, | Performed by: FAMILY MEDICINE

## 2023-04-24 PROCEDURE — 99214 OFFICE O/P EST MOD 30 MIN: CPT | Mod: S$GLB,,, | Performed by: FAMILY MEDICINE

## 2023-04-24 PROCEDURE — 3288F PR FALLS RISK ASSESSMENT DOCUMENTED: ICD-10-PCS | Mod: CPTII,S$GLB,, | Performed by: FAMILY MEDICINE

## 2023-04-24 PROCEDURE — 1101F PT FALLS ASSESS-DOCD LE1/YR: CPT | Mod: CPTII,S$GLB,, | Performed by: FAMILY MEDICINE

## 2023-04-24 PROCEDURE — 1160F PR REVIEW ALL MEDS BY PRESCRIBER/CLIN PHARMACIST DOCUMENTED: ICD-10-PCS | Mod: CPTII,S$GLB,, | Performed by: FAMILY MEDICINE

## 2023-04-24 NOTE — PROGRESS NOTES
"Routine Office Visit    Ivelisse Kern  1940  [id number]      Subjective     Ivelisse is a 83 y.o. female who presents today for:    Chronic back and body pain - Patient was on [facility] She has since stopped following up with Dr. Fernandez. She takes tylenol for her chronic pain. She was requesting a refill on tramadol but does not want to use it daily and does not want to have to come in every 3-4 months for prescription. She will continue using tylenol for pain for now.   History of breast cancer s/p arimidex therapy - Patient has completed therapy   Lymphedema - bilateral leg swelling. Patient was in Physical Therapy for lymphedema   Patient uses a walker to ambulate and transfer. She is currently in a wheelchair now. She does not drive.     Objective     Review of Systems   Constitutional:  Negative for chills and fever.   HENT:  Negative for congestion.    Eyes:  Negative for blurred vision.   Respiratory:  Negative for cough.    Cardiovascular:  Negative for chest pain.   Gastrointestinal:  Negative for abdominal pain, constipation, diarrhea, heartburn, nausea and vomiting.   Genitourinary:  Negative for dysuria.   Musculoskeletal:  Negative for myalgias.   Skin:  Negative for itching and rash.   Neurological:  Negative for dizziness and headaches.   Psychiatric/Behavioral:  Negative for depression.      /64   Pulse 100   Temp 98.4 °F (36.9 °C) (Oral)   Ht 5' 2" (1.575 m)   SpO2 96%   BMI 27.44 kg/m²   Physical Exam  Constitutional:       Appearance: She is well-developed.   HENT:      Head: Normocephalic and atraumatic.   Eyes:      Conjunctiva/sclera: Conjunctivae normal.      Pupils: Pupils are equal, round, and reactive to light.   Cardiovascular:      Rate and Rhythm: Normal rate and regular rhythm.      Heart sounds: Normal heart sounds. No murmur heard.    No friction rub. No gallop.   Pulmonary:      Effort: No respiratory distress.      Breath sounds: Normal " breath sounds.   Abdominal:      General: Bowel sounds are normal. There is no distension.      Palpations: Abdomen is soft.      Tenderness: There is no abdominal tenderness.   Musculoskeletal:         General: Normal range of motion.      Cervical back: Normal range of motion and neck supple.   Lymphadenopathy:      Cervical: No cervical adenopathy.   Skin:     General: Skin is warm.   Neurological:      Mental Status: She is alert and oriented to person, place, and time.           Assessment     Health Maintenance         Date Due Completion Date    Shingles Vaccine (3 of 3) 06/17/2022 4/22/2022    COVID-19 Vaccine (4 - Booster for Pfizer series) 07/15/2022 5/20/2022    DEXA Scan 12/16/2023 12/16/2021    Override on 1/7/2011: Done (osteoporosis)    Lipid Panel 04/17/2024 4/17/2023              Problem List Items Addressed This Visit          Pulmonary    COPD (chronic obstructive pulmonary disease) (Chronic)  The current medical regimen is effective;  continue present plan and medications.         Cardiac/Vascular    Essential hypertension (Chronic)    Relevant Orders    Comprehensive Metabolic Panel    CBC Auto Differential  The current medical regimen is effective;  continue present plan and medications.      Hyperlipidemia (Chronic)  The current medical regimen is effective;  continue present plan and medications.       Chronic diastolic congestive heart failure    Overview     Grade 1 diastolic dysfunction on Echo in 2020  Continue f/u with cardiology               Renal/    CKD (chronic kidney disease) stage 3, GFR 30-59 ml/min (Chronic)  Noted in chart         Oncology    Thrombocytosis - Primary    Relevant Orders    CBC Auto Differential    Iron and TIBC    Ferritin  Noted in chart   Recommend recheck in a few weeks   Consider referral to hem/onc        Endocrine    Hypothyroidism (Chronic)    Hyperparathyroidism , secondary, non-renal  The current medical regimen is effective;  continue present plan  and medications.  Recommend to continue vitamin d and calcium          GI    GERD (gastroesophageal reflux disease)  The current medical regimen is effective;  continue present plan and medications.        Other Visit Diagnoses       Malnutrition   Low prealbumin   Recommend increase protein   Recommend to take nutrition supplement with glucerna, glucerna or boost     Abnormal finding of blood chemistry, unspecified        Relevant Orders    Iron and TIBC    Ferritin                  Follow up in about 6 months (around 10/24/2023), or if symptoms worsen or fail to improve.

## 2023-05-16 DIAGNOSIS — J44.9 CHRONIC OBSTRUCTIVE PULMONARY DISEASE, UNSPECIFIED COPD TYPE: ICD-10-CM

## 2023-05-16 RX ORDER — ALBUTEROL SULFATE 90 UG/1
AEROSOL, METERED RESPIRATORY (INHALATION)
Qty: 8.5 G | Refills: 6 | Status: SHIPPED | OUTPATIENT
Start: 2023-05-16

## 2023-05-16 NOTE — TELEPHONE ENCOUNTER
Care Due:                  Date            Visit Type   Department     Provider  --------------------------------------------------------------------------------                                [facility]                              PRIMARY      MED/ INTERNAL  Last Visit: 04-      CARE ([facility])   MED/ PEDS      Mile Young                              [facility]                              PRIMARY      MED/ INTERNAL  Next Visit: 10-      CARE ([facility])   MED/ PEDS      Mile Young                                                            Last  Test          Frequency    Reason                     Performed    Due Date  --------------------------------------------------------------------------------    Uric Acid...  12 months..  allopurinoL..............  Not Found    Overdue    [employer] Embedded Care Due Messages. Reference number: [id number].   5/16/2023 6:21:58 AM CDT

## 2023-05-16 NOTE — TELEPHONE ENCOUNTER
Refill Routing Note   Medication(s) are not appropriate for processing by [facility] for the following reason(s):      No active prescription written by PCP    [facility] action(s):  Defer Labs due     Medication Therapy Plan: last ordered 4 years ago      Appointments  past 12m or future 3m with PCP    Date Provider   Last Visit   4/24/2023 Mile Young MD   Next Visit   10/24/2023 Mile Young MD   ED visits in past 90 days: 0        Note composed:9:47 AM 05/16/2023

## 2023-05-22 ENCOUNTER — LAB VISIT (OUTPATIENT)
Dept: LAB | Facility: HOSPITAL | Age: 83
End: 2023-05-22
Attending: FAMILY MEDICINE
Payer: MEDICARE

## 2023-05-22 DIAGNOSIS — R79.9 ABNORMAL FINDING OF BLOOD CHEMISTRY, UNSPECIFIED: ICD-10-CM

## 2023-05-22 DIAGNOSIS — D75.839 THROMBOCYTOSIS: ICD-10-CM

## 2023-05-22 LAB
BASOPHILS # BLD AUTO: 0.07 K/UL (ref 0–0.2)
BASOPHILS NFR BLD: 1 % (ref 0–1.9)
DIFFERENTIAL METHOD: ABNORMAL
EOSINOPHIL # BLD AUTO: 0.3 K/UL (ref 0–0.5)
EOSINOPHIL NFR BLD: 3.8 % (ref 0–8)
ERYTHROCYTE [DISTWIDTH] IN BLOOD BY AUTOMATED COUNT: 14.9 % (ref 11.5–14.5)
FERRITIN SERPL-MCNC: 40 NG/ML (ref 20–300)
HCT VFR BLD AUTO: 33.4 % (ref 37–48.5)
HGB BLD-MCNC: 10.4 G/DL (ref 12–16)
IMM GRANULOCYTES # BLD AUTO: 0.07 K/UL (ref 0–0.04)
IMM GRANULOCYTES NFR BLD AUTO: 1 % (ref 0–0.5)
IRON SERPL-MCNC: 35 UG/DL (ref 30–160)
LYMPHOCYTES # BLD AUTO: 1.7 K/UL (ref 1–4.8)
LYMPHOCYTES NFR BLD: 23.4 % (ref 18–48)
MCH RBC QN AUTO: 27.9 PG (ref 27–31)
MCHC RBC AUTO-ENTMCNC: 31.1 G/DL (ref 32–36)
MCV RBC AUTO: 90 FL (ref 82–98)
MONOCYTES # BLD AUTO: 0.6 K/UL (ref 0.3–1)
MONOCYTES NFR BLD: 7.8 % (ref 4–15)
NEUTROPHILS # BLD AUTO: 4.5 K/UL (ref 1.8–7.7)
NEUTROPHILS NFR BLD: 63 % (ref 38–73)
NRBC BLD-RTO: 0 /100 WBC
PLATELET # BLD AUTO: 515 K/UL (ref 150–450)
PMV BLD AUTO: 9.7 FL (ref 9.2–12.9)
RBC # BLD AUTO: 3.73 M/UL (ref 4–5.4)
SATURATED IRON: 9 % (ref 20–50)
TOTAL IRON BINDING CAPACITY: 407 UG/DL (ref 250–450)
TRANSFERRIN SERPL-MCNC: 275 MG/DL (ref 200–375)
WBC # BLD AUTO: 7.06 K/UL (ref 3.9–12.7)

## 2023-05-22 PROCEDURE — 85025 COMPLETE CBC W/AUTO DIFF WBC: CPT | Performed by: FAMILY MEDICINE

## 2023-05-22 PROCEDURE — 36415 COLL VENOUS BLD VENIPUNCTURE: CPT | Mod: PO | Performed by: FAMILY MEDICINE

## 2023-05-22 PROCEDURE — 84466 ASSAY OF TRANSFERRIN: CPT | Performed by: FAMILY MEDICINE

## 2023-05-22 PROCEDURE — 82728 ASSAY OF FERRITIN: CPT | Performed by: FAMILY MEDICINE

## 2023-05-23 ENCOUNTER — TELEPHONE (OUTPATIENT)
Dept: FAMILY MEDICINE | Facility: CLINIC | Age: 83
End: 2023-05-23
Payer: MEDICARE

## 2023-05-23 NOTE — TELEPHONE ENCOUNTER
----- Message from Mile Young MD sent at 5/22/2023  4:19 PM CDT -----  Anemia has improved   Stable

## 2023-05-30 ENCOUNTER — TELEPHONE (OUTPATIENT)
Dept: FAMILY MEDICINE | Facility: CLINIC | Age: 83
End: 2023-05-30
Payer: MEDICARE

## 2023-05-30 NOTE — TELEPHONE ENCOUNTER
----- Message from Nolan Bran sent at 5/30/2023  9:41 AM CDT -----  Regarding: Self  [phone]  Type: Patient Call Back    Who called: Self     What is the request in detail: called to check up on results for a test that the pt had taken on 05/22/2023, pt had missed the call from the doctor on 05/23/2023. Would like a call back.     Can the clinic reply by [facility]? No     Would the patient rather a call back or a response via [facility]? Call back     Best call back number: [phone]     Additional Information:    Thank you.

## 2023-05-30 NOTE — TELEPHONE ENCOUNTER
Spoke to patient and relay message per Dr. Young concerning labs. Patient verbalized understanding

## 2023-06-20 ENCOUNTER — PES CALL (OUTPATIENT)
Dept: ADMINISTRATIVE | Facility: CLINIC | Age: 83
End: 2023-06-20
Payer: MEDICARE

## 2023-06-24 DIAGNOSIS — K21.00 GASTROESOPHAGEAL REFLUX DISEASE WITH ESOPHAGITIS, UNSPECIFIED WHETHER HEMORRHAGE: ICD-10-CM

## 2023-06-24 DIAGNOSIS — J30.9 ALLERGIC RHINITIS, UNSPECIFIED SEASONALITY, UNSPECIFIED TRIGGER: ICD-10-CM

## 2023-06-24 DIAGNOSIS — E79.0 HYPERURICEMIA: ICD-10-CM

## 2023-06-24 DIAGNOSIS — I10 ESSENTIAL HYPERTENSION: Chronic | ICD-10-CM

## 2023-06-24 RX ORDER — OMEPRAZOLE 20 MG/1
CAPSULE, DELAYED RELEASE ORAL
Qty: 90 CAPSULE | Refills: 3 | Status: SHIPPED | OUTPATIENT
Start: 2023-06-24

## 2023-06-24 RX ORDER — METOPROLOL SUCCINATE 50 MG/1
TABLET, EXTENDED RELEASE ORAL
Qty: 90 TABLET | Refills: 3 | Status: SHIPPED | OUTPATIENT
Start: 2023-06-24

## 2023-06-24 RX ORDER — AMLODIPINE BESYLATE 10 MG/1
TABLET ORAL
Qty: 90 TABLET | Refills: 3 | Status: SHIPPED | OUTPATIENT
Start: 2023-06-24

## 2023-06-24 RX ORDER — MONTELUKAST SODIUM 10 MG/1
TABLET ORAL
Qty: 90 TABLET | Refills: 3 | Status: SHIPPED | OUTPATIENT
Start: 2023-06-24

## 2023-06-24 RX ORDER — ATORVASTATIN CALCIUM 40 MG/1
TABLET, FILM COATED ORAL
Qty: 90 TABLET | Refills: 3 | Status: SHIPPED | OUTPATIENT
Start: 2023-06-24

## 2023-06-24 NOTE — TELEPHONE ENCOUNTER
No care due was identified.  [facility] Embedded Care Due Messages. Reference number: [id number].   6/24/2023 6:08:52 AM CDT

## 2023-06-24 NOTE — TELEPHONE ENCOUNTER
Refill Routing Note   Medication(s) are not appropriate for processing by [facility] for the following reason(s):      Required labs outdated    [facility] action(s):  Approve  Defer Care Due:  None identified          Appointments  past 12m or future 3m with PCP    Date Provider   Last Visit   4/24/2023 Mile Young MD   Next Visit   10/24/2023 Mile Young MD   ED visits in past 90 days: 0        Note composed:7:43 AM 06/24/2023

## 2023-06-26 RX ORDER — ALLOPURINOL 100 MG/1
TABLET ORAL
Qty: 90 TABLET | Refills: 3 | Status: SHIPPED | OUTPATIENT
Start: 2023-06-26

## 2023-09-09 DIAGNOSIS — R32 URINARY INCONTINENCE, UNSPECIFIED TYPE: ICD-10-CM

## 2023-09-09 DIAGNOSIS — E03.9 HYPOTHYROIDISM, UNSPECIFIED TYPE: ICD-10-CM

## 2023-09-09 NOTE — TELEPHONE ENCOUNTER
Care Due:                  Date            Visit Type   Department     Provider  --------------------------------------------------------------------------------                                [facility]                              PRIMARY      MED/ INTERNAL  Last Visit: 04-      CARE ([facility])   MED/ PEDS      Mile Young                              [facility]                              PRIMARY      MED/ INTERNAL  Next Visit: 10-      CARE ([facility])   MED/ PEDS      Mile Young                                                            Last  Test          Frequency    Reason                     Performed    Due Date  --------------------------------------------------------------------------------    Uric Acid...  12 months..  allopurinoL..............  Not Found    Overdue    [employer] Embedded Care Due Messages. Reference number: [id number].   9/09/2023 4:45:07 AM CDT

## 2023-09-10 RX ORDER — LEVOTHYROXINE SODIUM 25 UG/1
TABLET ORAL
Qty: 90 TABLET | Refills: 2 | Status: SHIPPED | OUTPATIENT
Start: 2023-09-10

## 2023-09-10 RX ORDER — OXYBUTYNIN CHLORIDE 10 MG/1
10 TABLET, EXTENDED RELEASE ORAL
Qty: 90 TABLET | Refills: 2 | Status: SHIPPED | OUTPATIENT
Start: 2023-09-10

## 2023-09-10 NOTE — TELEPHONE ENCOUNTER
Provider Staff:  Action required for this patient     Please see care gap opportunities below in Care Due Message.    Thanks!  [facility]     Appointments      Date Provider   Last Visit   4/24/2023 Mile Young MD   Next Visit   10/24/2023 Mile Young MD      Refill Decision Note   Ivelisse Kern  is requesting a refill authorization.  Brief Assessment and Rationale for Refill:  Approve     Medication Therapy Plan:         Comments:     Note composed:3:28 AM 09/10/2023

## 2023-10-19 ENCOUNTER — LAB VISIT (OUTPATIENT)
Dept: LAB | Facility: HOSPITAL | Age: 83
End: 2023-10-19
Attending: FAMILY MEDICINE
Payer: MEDICARE

## 2023-10-19 DIAGNOSIS — I10 ESSENTIAL HYPERTENSION: ICD-10-CM

## 2023-10-19 LAB
ALBUMIN SERPL BCP-MCNC: 3.9 G/DL (ref 3.5–5.2)
ALP SERPL-CCNC: 115 U/L (ref 55–135)
ALT SERPL W/O P-5'-P-CCNC: 7 U/L (ref 10–44)
ANION GAP SERPL CALC-SCNC: 12 MMOL/L (ref 8–16)
AST SERPL-CCNC: 11 U/L (ref 10–40)
BASOPHILS # BLD AUTO: 0.07 K/UL (ref 0–0.2)
BASOPHILS NFR BLD: 0.8 % (ref 0–1.9)
BILIRUB SERPL-MCNC: 0.4 MG/DL (ref 0.1–1)
BUN SERPL-MCNC: 25 MG/DL (ref 8–23)
CALCIUM SERPL-MCNC: 9.7 MG/DL (ref 8.7–10.5)
CHLORIDE SERPL-SCNC: 107 MMOL/L (ref 95–110)
CO2 SERPL-SCNC: 23 MMOL/L (ref 23–29)
CREAT SERPL-MCNC: 0.9 MG/DL (ref 0.5–1.4)
DIFFERENTIAL METHOD: ABNORMAL
EOSINOPHIL # BLD AUTO: 0.2 K/UL (ref 0–0.5)
EOSINOPHIL NFR BLD: 2.8 % (ref 0–8)
ERYTHROCYTE [DISTWIDTH] IN BLOOD BY AUTOMATED COUNT: 15.6 % (ref 11.5–14.5)
EST. GFR  (NO RACE VARIABLE): >60 ML/MIN/1.73 M^2
GLUCOSE SERPL-MCNC: 104 MG/DL (ref 70–110)
HCT VFR BLD AUTO: 33.5 % (ref 37–48.5)
HGB BLD-MCNC: 9.9 G/DL (ref 12–16)
IMM GRANULOCYTES # BLD AUTO: 0.03 K/UL (ref 0–0.04)
IMM GRANULOCYTES NFR BLD AUTO: 0.3 % (ref 0–0.5)
LYMPHOCYTES # BLD AUTO: 1.8 K/UL (ref 1–4.8)
LYMPHOCYTES NFR BLD: 20.4 % (ref 18–48)
MCH RBC QN AUTO: 26.2 PG (ref 27–31)
MCHC RBC AUTO-ENTMCNC: 29.6 G/DL (ref 32–36)
MCV RBC AUTO: 89 FL (ref 82–98)
MONOCYTES # BLD AUTO: 0.6 K/UL (ref 0.3–1)
MONOCYTES NFR BLD: 6.9 % (ref 4–15)
NEUTROPHILS # BLD AUTO: 6 K/UL (ref 1.8–7.7)
NEUTROPHILS NFR BLD: 68.8 % (ref 38–73)
NRBC BLD-RTO: 0 /100 WBC
PLATELET # BLD AUTO: 474 K/UL (ref 150–450)
PMV BLD AUTO: 10 FL (ref 9.2–12.9)
POTASSIUM SERPL-SCNC: 3.9 MMOL/L (ref 3.5–5.1)
PROT SERPL-MCNC: 7.7 G/DL (ref 6–8.4)
RBC # BLD AUTO: 3.78 M/UL (ref 4–5.4)
SODIUM SERPL-SCNC: 142 MMOL/L (ref 136–145)
WBC # BLD AUTO: 8.72 K/UL (ref 3.9–12.7)

## 2023-10-19 PROCEDURE — 85025 COMPLETE CBC W/AUTO DIFF WBC: CPT | Performed by: FAMILY MEDICINE

## 2023-10-19 PROCEDURE — 36415 COLL VENOUS BLD VENIPUNCTURE: CPT | Mod: PO | Performed by: FAMILY MEDICINE

## 2023-10-19 PROCEDURE — 80053 COMPREHEN METABOLIC PANEL: CPT | Performed by: FAMILY MEDICINE

## 2023-10-27 ENCOUNTER — OFFICE VISIT (OUTPATIENT)
Dept: FAMILY MEDICINE | Facility: CLINIC | Age: 83
End: 2023-10-27
Payer: MEDICARE

## 2023-10-27 VITALS
WEIGHT: 150 LBS | BODY MASS INDEX: 27.6 KG/M2 | HEART RATE: 94 BPM | SYSTOLIC BLOOD PRESSURE: 110 MMHG | DIASTOLIC BLOOD PRESSURE: 60 MMHG | HEIGHT: 62 IN | OXYGEN SATURATION: 98 % | TEMPERATURE: 98 F

## 2023-10-27 DIAGNOSIS — E78.5 HYPERLIPIDEMIA, UNSPECIFIED HYPERLIPIDEMIA TYPE: ICD-10-CM

## 2023-10-27 DIAGNOSIS — E21.1 HYPERPARATHYROIDISM , SECONDARY, NON-RENAL: ICD-10-CM

## 2023-10-27 DIAGNOSIS — Z00.00 ANNUAL PHYSICAL EXAM: Primary | ICD-10-CM

## 2023-10-27 DIAGNOSIS — I50.32 CHRONIC DIASTOLIC CONGESTIVE HEART FAILURE: ICD-10-CM

## 2023-10-27 DIAGNOSIS — I10 ESSENTIAL HYPERTENSION: ICD-10-CM

## 2023-10-27 DIAGNOSIS — Z23 NEED FOR PROPHYLACTIC VACCINATION AND INOCULATION AGAINST INFLUENZA: ICD-10-CM

## 2023-10-27 DIAGNOSIS — J44.9 CHRONIC OBSTRUCTIVE PULMONARY DISEASE, UNSPECIFIED COPD TYPE: ICD-10-CM

## 2023-10-27 PROCEDURE — 3288F FALL RISK ASSESSMENT DOCD: CPT | Mod: CPTII,S$GLB,, | Performed by: FAMILY MEDICINE

## 2023-10-27 PROCEDURE — 90694 FLU VACCINE - QUADRIVALENT - ADJUVANTED: ICD-10-PCS | Mod: S$GLB,,, | Performed by: FAMILY MEDICINE

## 2023-10-27 PROCEDURE — 1125F PR PAIN SEVERITY QUANTIFIED, PAIN PRESENT: ICD-10-PCS | Mod: CPTII,S$GLB,, | Performed by: FAMILY MEDICINE

## 2023-10-27 PROCEDURE — 1101F PR PT FALLS ASSESS DOC 0-1 FALLS W/OUT INJ PAST YR: ICD-10-PCS | Mod: CPTII,S$GLB,, | Performed by: FAMILY MEDICINE

## 2023-10-27 PROCEDURE — 1125F AMNT PAIN NOTED PAIN PRSNT: CPT | Mod: CPTII,S$GLB,, | Performed by: FAMILY MEDICINE

## 2023-10-27 PROCEDURE — 1101F PT FALLS ASSESS-DOCD LE1/YR: CPT | Mod: CPTII,S$GLB,, | Performed by: FAMILY MEDICINE

## 2023-10-27 PROCEDURE — 99397 PR PREVENTIVE VISIT,EST,65 & OVER: ICD-10-PCS | Mod: GZ,S$GLB,, | Performed by: FAMILY MEDICINE

## 2023-10-27 PROCEDURE — 90694 VACC AIIV4 NO PRSRV 0.5ML IM: CPT | Mod: S$GLB,,, | Performed by: FAMILY MEDICINE

## 2023-10-27 PROCEDURE — G0008 FLU VACCINE - QUADRIVALENT - ADJUVANTED: ICD-10-PCS | Mod: S$GLB,,, | Performed by: FAMILY MEDICINE

## 2023-10-27 PROCEDURE — 3288F PR FALLS RISK ASSESSMENT DOCUMENTED: ICD-10-PCS | Mod: CPTII,S$GLB,, | Performed by: FAMILY MEDICINE

## 2023-10-27 PROCEDURE — 1159F PR MEDICATION LIST DOCUMENTED IN MEDICAL RECORD: ICD-10-PCS | Mod: CPTII,S$GLB,, | Performed by: FAMILY MEDICINE

## 2023-10-27 PROCEDURE — 99397 PER PM REEVAL EST PAT 65+ YR: CPT | Mod: GZ,S$GLB,, | Performed by: FAMILY MEDICINE

## 2023-10-27 PROCEDURE — 1160F PR REVIEW ALL MEDS BY PRESCRIBER/CLIN PHARMACIST DOCUMENTED: ICD-10-PCS | Mod: CPTII,S$GLB,, | Performed by: FAMILY MEDICINE

## 2023-10-27 PROCEDURE — 99999 PR PBB SHADOW E&M-EST. PATIENT-LVL IV: ICD-10-PCS | Mod: PBBFAC,,, | Performed by: FAMILY MEDICINE

## 2023-10-27 PROCEDURE — 99999 PR PBB SHADOW E&M-EST. PATIENT-LVL IV: CPT | Mod: PBBFAC,,, | Performed by: FAMILY MEDICINE

## 2023-10-27 PROCEDURE — 3078F PR MOST RECENT DIASTOLIC BLOOD PRESSURE < 80 MM HG: ICD-10-PCS | Mod: CPTII,S$GLB,, | Performed by: FAMILY MEDICINE

## 2023-10-27 PROCEDURE — 3074F SYST BP LT 130 MM HG: CPT | Mod: CPTII,S$GLB,, | Performed by: FAMILY MEDICINE

## 2023-10-27 PROCEDURE — 1159F MED LIST DOCD IN RCRD: CPT | Mod: CPTII,S$GLB,, | Performed by: FAMILY MEDICINE

## 2023-10-27 PROCEDURE — 3074F PR MOST RECENT SYSTOLIC BLOOD PRESSURE < 130 MM HG: ICD-10-PCS | Mod: CPTII,S$GLB,, | Performed by: FAMILY MEDICINE

## 2023-10-27 PROCEDURE — G0008 ADMIN INFLUENZA VIRUS VAC: HCPCS | Mod: S$GLB,,, | Performed by: FAMILY MEDICINE

## 2023-10-27 PROCEDURE — 1160F RVW MEDS BY RX/DR IN RCRD: CPT | Mod: CPTII,S$GLB,, | Performed by: FAMILY MEDICINE

## 2023-10-27 PROCEDURE — 3078F DIAST BP <80 MM HG: CPT | Mod: CPTII,S$GLB,, | Performed by: FAMILY MEDICINE

## 2023-10-27 NOTE — PROGRESS NOTES
"Routine Office Visit    Ivelisse Kern  1940  [id number]      Subjective     Ivelisse is a 83 y.o. female who presents today for:    Annual physical   Chronic back and body pain - Patient was previously on tramadol for back pain.  She now takes tylenol for her chronic pain.   History of breast cancer s/p arimidex therapy - Patient has completed therapy   Lymphedema - bilateral leg swelling. Patient was in Physical Therapy for lymphedema   Patient uses a walker to ambulate and transfer. She is currently in a wheelchair now. She does not drive. her nephew or her sister drives her and gets groceries. Her sister cooks her meal. Her sister is 5 years old. She lives by herself.     Objective     Review of Systems   Constitutional:  Negative for chills and fever.   HENT:  Negative for congestion.    Eyes:  Negative for blurred vision.   Respiratory:  Negative for cough.    Cardiovascular:  Negative for chest pain.   Gastrointestinal:  Negative for abdominal pain, constipation, diarrhea, heartburn, nausea and vomiting.   Genitourinary:  Negative for dysuria.   Musculoskeletal:  Negative for myalgias.   Skin:  Negative for itching and rash.   Neurological:  Negative for dizziness and headaches.   Psychiatric/Behavioral:  Negative for depression.      Do you have leakage of urine? No    /60   Pulse 94   Temp 98.1 °F (36.7 °C)   Ht 5' 2" (1.575 m)   Wt 68 kg (150 lb)   SpO2 98%   BMI 27.44 kg/m²   Physical Exam  Constitutional:       Appearance: She is well-developed.   HENT:      Head: Normocephalic and atraumatic.   Eyes:      Conjunctiva/sclera: Conjunctivae normal.      Pupils: Pupils are equal, round, and reactive to light.   Cardiovascular:      Rate and Rhythm: Normal rate and regular rhythm.      Heart sounds: Normal heart sounds. No murmur heard.     No friction rub. No gallop.   Pulmonary:      Effort: No respiratory distress.      Breath sounds: Normal breath sounds.   Abdominal:      General: Bowel sounds " are normal. There is no distension.      Palpations: Abdomen is soft.      Tenderness: There is no abdominal tenderness.   Musculoskeletal:         General: Normal range of motion.      Cervical back: Normal range of motion and neck supple.   Lymphadenopathy:      Cervical: No cervical adenopathy.   Skin:     General: Skin is warm.   Neurological:      Mental Status: She is alert and oriented to person, place, and time.         Ambulates with walker    Assessment     Health Maintenance         Date Due Completion Date    Shingles Vaccine (3 of 3) 06/17/2022 4/22/2022    COVID-19 Vaccine (4 - 2023-24 season) 09/01/2023 5/20/2022    DEXA Scan 12/16/2023 12/16/2021    Override on 1/7/2011: Done (osteoporosis)    Lipid Panel 04/17/2024 4/17/2023              Problem List Items Addressed This Visit          Pulmonary    COPD (chronic obstructive pulmonary disease) (Chronic)  The current medical regimen is effective;  continue present plan and medications.          Cardiac/Vascular    Essential hypertension - Primary (Chronic)    Relevant Orders    CBC Auto Differential    Comprehensive Metabolic Panel    Lipid Panel    TSH    PTH, intact    URIC ACID    Ambulatory referral/consult to Cardiology  The current medical regimen is effective;  continue present plan and medications.       Hyperlipidemia (Chronic)  The current medical regimen is effective;  continue present plan and medications.       Chronic diastolic congestive heart failure    Overview     Grade 1 diastolic dysfunction on Echo in 2020         Relevant Orders    Ambulatory referral/consult to Cardiology       Endocrine    Hyperparathyroidism , secondary, non-renal    Relevant Orders    PTH, intact  Stable continue to monitor        Other Visit Diagnoses       Need for prophylactic vaccination and inoculation against influenza        Relevant Orders    Influenza (FLUAD) - Quadrivalent (Adjuvanted) *Preferred* (65+) (PF) (Completed)   Annual physical   I  addressed all major concerns as it related to health maintenance.  All were ordered and scheduled based on the patients wishes.  Any additional health maintenance will be readdressed at the next physical if declined or deferred by the patient.                  Follow up in about 6 months (around 4/27/2024), or if symptoms worsen or fail to improve.

## 2024-01-11 ENCOUNTER — PATIENT OUTREACH (OUTPATIENT)
Dept: ADMINISTRATIVE | Facility: HOSPITAL | Age: 84
End: 2024-01-11
Payer: MEDICARE

## 2024-01-24 DIAGNOSIS — Z78.0 MENOPAUSE: ICD-10-CM

## 2024-04-17 ENCOUNTER — PATIENT OUTREACH (OUTPATIENT)
Dept: ADMINISTRATIVE | Facility: HOSPITAL | Age: 84
End: 2024-04-17
Payer: MEDICARE

## 2024-04-17 DIAGNOSIS — N18.31 STAGE 3A CHRONIC KIDNEY DISEASE: Primary | Chronic | ICD-10-CM

## 2024-04-25 ENCOUNTER — LAB VISIT (OUTPATIENT)
Dept: LAB | Facility: HOSPITAL | Age: 84
End: 2024-04-25
Attending: FAMILY MEDICINE
Payer: MEDICARE

## 2024-04-25 DIAGNOSIS — E21.1 HYPERPARATHYROIDISM , SECONDARY, NON-RENAL: ICD-10-CM

## 2024-04-25 DIAGNOSIS — I10 ESSENTIAL HYPERTENSION: ICD-10-CM

## 2024-04-25 LAB
ALBUMIN SERPL BCP-MCNC: 3.6 G/DL (ref 3.5–5.2)
ALP SERPL-CCNC: 121 U/L (ref 55–135)
ALT SERPL W/O P-5'-P-CCNC: 6 U/L (ref 10–44)
ANION GAP SERPL CALC-SCNC: 10 MMOL/L (ref 8–16)
AST SERPL-CCNC: 12 U/L (ref 10–40)
BASOPHILS # BLD AUTO: 0.07 K/UL (ref 0–0.2)
BASOPHILS NFR BLD: 0.9 % (ref 0–1.9)
BILIRUB SERPL-MCNC: 0.4 MG/DL (ref 0.1–1)
BUN SERPL-MCNC: 16 MG/DL (ref 8–23)
CALCIUM SERPL-MCNC: 9.5 MG/DL (ref 8.7–10.5)
CHLORIDE SERPL-SCNC: 107 MMOL/L (ref 95–110)
CHOLEST SERPL-MCNC: 140 MG/DL (ref 120–199)
CHOLEST/HDLC SERPL: 3.7 {RATIO} (ref 2–5)
CO2 SERPL-SCNC: 23 MMOL/L (ref 23–29)
CREAT SERPL-MCNC: 0.9 MG/DL (ref 0.5–1.4)
DIFFERENTIAL METHOD BLD: ABNORMAL
EOSINOPHIL # BLD AUTO: 0.3 K/UL (ref 0–0.5)
EOSINOPHIL NFR BLD: 3.3 % (ref 0–8)
ERYTHROCYTE [DISTWIDTH] IN BLOOD BY AUTOMATED COUNT: 15.2 % (ref 11.5–14.5)
EST. GFR  (NO RACE VARIABLE): >60 ML/MIN/1.73 M^2
GLUCOSE SERPL-MCNC: 94 MG/DL (ref 70–110)
HCT VFR BLD AUTO: 29.7 % (ref 37–48.5)
HDLC SERPL-MCNC: 38 MG/DL (ref 40–75)
HDLC SERPL: 27.1 % (ref 20–50)
HGB BLD-MCNC: 9.1 G/DL (ref 12–16)
IMM GRANULOCYTES # BLD AUTO: 0.06 K/UL (ref 0–0.04)
IMM GRANULOCYTES NFR BLD AUTO: 0.8 % (ref 0–0.5)
LDLC SERPL CALC-MCNC: 74.4 MG/DL (ref 63–159)
LYMPHOCYTES # BLD AUTO: 1.4 K/UL (ref 1–4.8)
LYMPHOCYTES NFR BLD: 17.8 % (ref 18–48)
MCH RBC QN AUTO: 26.9 PG (ref 27–31)
MCHC RBC AUTO-ENTMCNC: 30.6 G/DL (ref 32–36)
MCV RBC AUTO: 88 FL (ref 82–98)
MONOCYTES # BLD AUTO: 0.6 K/UL (ref 0.3–1)
MONOCYTES NFR BLD: 8.2 % (ref 4–15)
NEUTROPHILS # BLD AUTO: 5.2 K/UL (ref 1.8–7.7)
NEUTROPHILS NFR BLD: 69 % (ref 38–73)
NONHDLC SERPL-MCNC: 102 MG/DL
NRBC BLD-RTO: 0 /100 WBC
PLATELET # BLD AUTO: 466 K/UL (ref 150–450)
PMV BLD AUTO: 9.7 FL (ref 9.2–12.9)
POTASSIUM SERPL-SCNC: 4.1 MMOL/L (ref 3.5–5.1)
PROT SERPL-MCNC: 7.5 G/DL (ref 6–8.4)
PTH-INTACT SERPL-MCNC: 106.7 PG/ML (ref 9–77)
RBC # BLD AUTO: 3.38 M/UL (ref 4–5.4)
SODIUM SERPL-SCNC: 140 MMOL/L (ref 136–145)
TRIGL SERPL-MCNC: 138 MG/DL (ref 30–150)
TSH SERPL DL<=0.005 MIU/L-ACNC: 2.47 UIU/ML (ref 0.4–4)
URATE SERPL-MCNC: 4.2 MG/DL (ref 2.4–5.7)
WBC # BLD AUTO: 7.58 K/UL (ref 3.9–12.7)

## 2024-04-25 PROCEDURE — 84550 ASSAY OF BLOOD/URIC ACID: CPT | Performed by: FAMILY MEDICINE

## 2024-04-25 PROCEDURE — 80061 LIPID PANEL: CPT | Performed by: FAMILY MEDICINE

## 2024-04-25 PROCEDURE — 36415 COLL VENOUS BLD VENIPUNCTURE: CPT | Mod: PO | Performed by: FAMILY MEDICINE

## 2024-04-25 PROCEDURE — 85025 COMPLETE CBC W/AUTO DIFF WBC: CPT | Performed by: FAMILY MEDICINE

## 2024-04-25 PROCEDURE — 83970 ASSAY OF PARATHORMONE: CPT | Performed by: FAMILY MEDICINE

## 2024-04-25 PROCEDURE — 84443 ASSAY THYROID STIM HORMONE: CPT | Performed by: FAMILY MEDICINE

## 2024-04-25 PROCEDURE — 80053 COMPREHEN METABOLIC PANEL: CPT | Performed by: FAMILY MEDICINE

## 2024-04-30 ENCOUNTER — TELEPHONE (OUTPATIENT)
Dept: FAMILY MEDICINE | Facility: CLINIC | Age: 84
End: 2024-04-30
Payer: MEDICARE

## 2024-04-30 ENCOUNTER — LAB VISIT (OUTPATIENT)
Dept: LAB | Facility: HOSPITAL | Age: 84
End: 2024-04-30
Attending: FAMILY MEDICINE
Payer: MEDICARE

## 2024-04-30 DIAGNOSIS — N18.31 STAGE 3A CHRONIC KIDNEY DISEASE: Primary | Chronic | ICD-10-CM

## 2024-04-30 DIAGNOSIS — N18.31 STAGE 3A CHRONIC KIDNEY DISEASE: Chronic | ICD-10-CM

## 2024-04-30 LAB
ALBUMIN/CREAT UR: 12.3 UG/MG (ref 0–30)
CREAT UR-MCNC: 138 MG/DL (ref 15–325)
MICROALBUMIN UR DL<=1MG/L-MCNC: 17 UG/ML

## 2024-04-30 PROCEDURE — 82043 UR ALBUMIN QUANTITATIVE: CPT | Performed by: FAMILY MEDICINE

## 2024-04-30 NOTE — TELEPHONE ENCOUNTER
----- Message from Myla Alarcon sent at 4/30/2024  3:51 PM CDT -----  Regarding: URINE ORDER  PT CAME IN TO DROP OFF URINE SAMPLE ORDERS WERE ATTACHED TO A NO SHOW APPOINTMENT AND NOW CAN'T BE LINKED. URINE WAS DROPPED OFF TO LAB ON TODAY JUST NEED NEW ORDERS TO COMPLETE CHECK IN. THANK YOU

## 2024-04-30 NOTE — TELEPHONE ENCOUNTER
"Spoke to patient to inquire if she dropped off a urine specimen on today? Patient states "Yes, I got labs drawn on last week, & needed to drop off a urine specimen. My sister dropped it off for me". Another order written per order written guidelines. Myla in Patient registration notified so that check in process can be completed. Provider will be notified.   "

## 2024-05-02 ENCOUNTER — OFFICE VISIT (OUTPATIENT)
Dept: FAMILY MEDICINE | Facility: CLINIC | Age: 84
End: 2024-05-02
Payer: MEDICARE

## 2024-05-02 VITALS
OXYGEN SATURATION: 99 % | DIASTOLIC BLOOD PRESSURE: 62 MMHG | SYSTOLIC BLOOD PRESSURE: 120 MMHG | WEIGHT: 160 LBS | HEART RATE: 97 BPM | BODY MASS INDEX: 29.26 KG/M2 | TEMPERATURE: 98 F

## 2024-05-02 DIAGNOSIS — E21.1 HYPERPARATHYROIDISM , SECONDARY, NON-RENAL: ICD-10-CM

## 2024-05-02 DIAGNOSIS — J44.9 CHRONIC OBSTRUCTIVE PULMONARY DISEASE, UNSPECIFIED COPD TYPE: ICD-10-CM

## 2024-05-02 DIAGNOSIS — I89.0 LYMPHEDEMA: ICD-10-CM

## 2024-05-02 DIAGNOSIS — L97.922 CHRONIC ULCER OF LEFT LOWER EXTREMITY WITH FAT LAYER EXPOSED: ICD-10-CM

## 2024-05-02 DIAGNOSIS — I50.32 CHRONIC DIASTOLIC CONGESTIVE HEART FAILURE: ICD-10-CM

## 2024-05-02 DIAGNOSIS — N18.31 STAGE 3A CHRONIC KIDNEY DISEASE: ICD-10-CM

## 2024-05-02 DIAGNOSIS — I10 ESSENTIAL HYPERTENSION: Primary | ICD-10-CM

## 2024-05-02 PROBLEM — E46 MALNUTRITION: Status: RESOLVED | Noted: 2023-04-24 | Resolved: 2024-05-02

## 2024-05-02 PROCEDURE — 87186 SC STD MICRODIL/AGAR DIL: CPT | Mod: 59 | Performed by: FAMILY MEDICINE

## 2024-05-02 PROCEDURE — 1101F PT FALLS ASSESS-DOCD LE1/YR: CPT | Mod: CPTII,S$GLB,, | Performed by: FAMILY MEDICINE

## 2024-05-02 PROCEDURE — 1159F MED LIST DOCD IN RCRD: CPT | Mod: CPTII,S$GLB,, | Performed by: FAMILY MEDICINE

## 2024-05-02 PROCEDURE — 3074F SYST BP LT 130 MM HG: CPT | Mod: CPTII,S$GLB,, | Performed by: FAMILY MEDICINE

## 2024-05-02 PROCEDURE — 3288F FALL RISK ASSESSMENT DOCD: CPT | Mod: CPTII,S$GLB,, | Performed by: FAMILY MEDICINE

## 2024-05-02 PROCEDURE — 87077 CULTURE AEROBIC IDENTIFY: CPT | Mod: 59 | Performed by: FAMILY MEDICINE

## 2024-05-02 PROCEDURE — 1126F AMNT PAIN NOTED NONE PRSNT: CPT | Mod: CPTII,S$GLB,, | Performed by: FAMILY MEDICINE

## 2024-05-02 PROCEDURE — 3078F DIAST BP <80 MM HG: CPT | Mod: CPTII,S$GLB,, | Performed by: FAMILY MEDICINE

## 2024-05-02 PROCEDURE — 99999 PR PBB SHADOW E&M-EST. PATIENT-LVL V: CPT | Mod: PBBFAC,,, | Performed by: FAMILY MEDICINE

## 2024-05-02 PROCEDURE — 99215 OFFICE O/P EST HI 40 MIN: CPT | Mod: S$GLB,,, | Performed by: FAMILY MEDICINE

## 2024-05-02 PROCEDURE — 1160F RVW MEDS BY RX/DR IN RCRD: CPT | Mod: CPTII,S$GLB,, | Performed by: FAMILY MEDICINE

## 2024-05-02 PROCEDURE — 87070 CULTURE OTHR SPECIMN AEROBIC: CPT | Performed by: FAMILY MEDICINE

## 2024-05-02 NOTE — Clinical Note
Patient admitted previously for her back wound. Now has leg wound. Please see when we can get her in. I ordered home health.

## 2024-05-02 NOTE — PROGRESS NOTES
Routine Office Visit    Ivelisse Kern  1940  [id number]      Subjective     Ivelisse is a 84 y.o. female who presents today for:    History of breast cancer s/p arimidex therapy - Patient has completed therapy   Hyperparathyroidism - Patient has been evaluated by endocrine - Patient was not interested in surgery. Continue to monitor vitamin d and calcium   Osteoporosis - consider restarting prolia. Patient will have dexa bone scan done.   Left lower extremity wound - Patient states symptoms started after leg swelling. She has been self treating at home with pads. She states it leaks all the time. No pain.   Lymphedema - bilateral leg swelling. Patient was in Physical Therapy for lymphedema   Patient uses a walker to ambulate and transfer. She is currently in a wheelchair now. She does not drive. her nephew or her sister drives her and gets groceries. Her sister cooks her meal.     Objective     Review of Systems   Constitutional:  Negative for chills and fever.   HENT:  Negative for congestion.    Eyes:  Negative for blurred vision.   Respiratory:  Negative for cough.    Cardiovascular:  Negative for chest pain.   Gastrointestinal:  Negative for abdominal pain, constipation, diarrhea, heartburn, nausea and vomiting.   Genitourinary:  Negative for dysuria.   Musculoskeletal:  Negative for myalgias.   Skin:  Negative for itching and rash.   Neurological:  Negative for dizziness and headaches.   Psychiatric/Behavioral:  Negative for depression.      /62 (BP Location: Left arm, Patient Position: Sitting, BP Method: Large (Manual))   Pulse 97   Temp 98.3 °F (36.8 °C) (Oral)   Wt 72.6 kg (160 lb)   SpO2 99%   BMI 29.26 kg/m²   Physical Exam  Constitutional:       Appearance: She is well-developed.   HENT:      Head: Normocephalic and atraumatic.   Eyes:      Conjunctiva/sclera: Conjunctivae normal.      Pupils: Pupils are equal, round, and reactive to light.   Cardiovascular:      Rate and Rhythm: Normal rate  and regular rhythm.      Heart sounds: Normal heart sounds. No murmur heard.     No friction rub. No gallop.   Pulmonary:      Effort: No respiratory distress.      Breath sounds: Normal breath sounds.   Abdominal:      General: Bowel sounds are normal. There is no distension.      Palpations: Abdomen is soft.      Tenderness: There is no abdominal tenderness.   Musculoskeletal:         General: Normal range of motion.      Cervical back: Normal range of motion and neck supple.   Lymphadenopathy:      Cervical: No cervical adenopathy.   Skin:     General: Skin is warm.   Neurological:      Mental Status: She is alert and oriented to person, place, and time.                Assessment     Health Maintenance         Date Due Completion Date    RSV Vaccine (Age 60+ and Pregnant patients) (1 - 1-dose 60+ series) Never done ---    Shingles Vaccine (3 of 3) 06/17/2022 4/22/2022    COVID-19 Vaccine (4 - 2023-24 season) 09/01/2023 5/20/2022    DEXA Scan 12/16/2023 12/16/2021    Override on 1/7/2011: Done (osteoporosis)    Lipid Panel 04/25/2025 4/25/2024              Problem List Items Addressed This Visit          Pulmonary    COPD (chronic obstructive pulmonary disease) (Chronic)  The current medical regimen is effective;  continue present plan and medications.          Cardiac/Vascular    Essential hypertension - Primary (Chronic)    Relevant Orders    CBC Auto Differential    Comprehensive Metabolic Panel  The current medical regimen is effective;  continue present plan and medications.       Chronic diastolic congestive heart failure    Overview     Grade 1 diastolic dysfunction on Echo in 2020            Renal/    Stage 3a chronic kidney disease  Stable   Continue to monitor          Endocrine    Hyperparathyroidism , secondary, non-renal  Continue to monitor calcium   Consider restarting prolia        Other    Lymphedema    Relevant Orders    CULTURE, AEROBIC  (SPECIFY SOURCE)    Ankle Brachial Indices ([place])     Ambulatory referral/consult to Home Health    Ambulatory referral/consult to Wound Clinic     Other Visit Diagnoses       Chronic ulcer of left lower extremity with fat layer exposed        Relevant Orders    CULTURE, AEROBIC  (SPECIFY SOURCE)    Ankle Brachial Indices ([place])    Ambulatory referral/consult to Home Health    Ambulatory referral/consult to Wound Clinic  F/u and treat as indicated   Home health to change leg dressing                 Greater than 45 minutes was spent with this patient with greater than 50% spent with face-to-face counseling     Follow up in about 6 months (around 11/2/2024), or if symptoms worsen or fail to improve.

## 2024-05-06 ENCOUNTER — HOSPITAL ENCOUNTER (OUTPATIENT)
Dept: WOUND CARE | Facility: HOSPITAL | Age: 84
Discharge: HOME | End: 2024-05-06
Attending: FAMILY MEDICINE
Payer: MEDICARE

## 2024-05-06 ENCOUNTER — TELEPHONE (OUTPATIENT)
Dept: FAMILY MEDICINE | Facility: CLINIC | Age: 84
End: 2024-05-06
Payer: MEDICARE

## 2024-05-06 VITALS
DIASTOLIC BLOOD PRESSURE: 67 MMHG | SYSTOLIC BLOOD PRESSURE: 127 MMHG | RESPIRATION RATE: 18 BRPM | HEART RATE: 112 BPM | TEMPERATURE: 97 F

## 2024-05-06 DIAGNOSIS — I89.0 LYMPHEDEMA: ICD-10-CM

## 2024-05-06 DIAGNOSIS — L03.90 CELLULITIS, UNSPECIFIED CELLULITIS SITE: Primary | ICD-10-CM

## 2024-05-06 DIAGNOSIS — R89.5 WOUND SWAB CULTURE POSITIVE: ICD-10-CM

## 2024-05-06 DIAGNOSIS — L03.116 CELLULITIS OF LEFT LOWER EXTREMITY: Primary | ICD-10-CM

## 2024-05-06 DIAGNOSIS — L97.922 CHRONIC ULCER OF LEFT LOWER EXTREMITY WITH FAT LAYER EXPOSED: ICD-10-CM

## 2024-05-06 LAB
BACTERIA SPEC AEROBE CULT: ABNORMAL
BACTERIA SPEC AEROBE CULT: ABNORMAL

## 2024-05-06 PROCEDURE — 99214 OFFICE O/P EST MOD 30 MIN: CPT | Mod: ,,, | Performed by: INTERNAL MEDICINE

## 2024-05-06 PROCEDURE — 99213 OFFICE O/P EST LOW 20 MIN: CPT | Performed by: INTERNAL MEDICINE

## 2024-05-06 RX ORDER — CEFDINIR 300 MG/1
300 CAPSULE ORAL 2 TIMES DAILY
Qty: 20 CAPSULE | Refills: 0 | Status: SHIPPED | OUTPATIENT
Start: 2024-05-06 | End: 2024-05-16

## 2024-05-06 NOTE — PROGRESS NOTES
"[facility]                Progress Note    Subjective:       Patient ID: Ivelisse Kern is a 84 y.o. female.    Chief Complaint: Wound Care and Dressing Change    Patient was seen today in [facility] as a readmit. Patient in wheelchair push by LPN, with  sitting in waiting area. Patient ambulated from the wheelchair, with wheel locked, to the exam chair with LPN standing by to assist. She denies pain but do c/o discomfort to the Left Lower Leg wounds. Denies fever, chills, nausea, vomiting, or diarrhea at present. Dressing was intact without strike through drainage. Patient states "she used whatever she had to dress the wound because of all the drainage."  Patient with on to state "she change the dressing in the morning and night because of so much drainage." Wound care done per MD orders. Request for Home Health will be put in today per MD orders. Patient ambulated from the exam chair to the wheelchair, with wheels lock, with LPN standing by to assist. AVS printed and given to the patient.     Return to clinic in 2 week.    Seen by pcp last week for drainage left leg x one week no periwound pain she has lymphedema but has not been using compression pumps or stockings due to difficulty donning. No fevers/chills she uses wheelchair for any distance outside of home no recent falls no pain with ambulation      Review of Systems      Objective:        Physical Exam  Constitutional:       General: She is not in acute distress.  Pulmonary:      Effort: Pulmonary effort is normal. No respiratory distress.   Musculoskeletal:      Right lower leg: Edema present.      Left lower leg: Edema present.   Skin:     Comments: Chronic edematous changes of bilatreal lower legs L>R there is clear drainage from anterior superficial skin breaks without tenderness to palpation no fluctuance with palpation    Neurological:      Mental Status: She is alert.         Vitals:    05/06/24 1006 "   BP: 127/67   Pulse: (!) 112   Resp: 18   Temp: 97.2 °F (36.2 °C)       Assessment:           ICD-10-CM ICD-9-CM   1. Cellulitis of left lower extremity  L03.116 682.6   2. Lymphedema  I89.0 457.1   3. Chronic ulcer of left lower extremity with fat layer exposed  L97.922 707.10            Wound 05/06/24 0920 Blister(s) Left anterior;lateral;posterior;medial;lower Leg (Active)   05/06/24 0920   Present on Original Admission:    Primary Wound Type: Blister(s)   Side: Left   Orientation: anterior;lateral;posterior;medial;lower   Location: Leg   Wound Approximate Age at First Assessment (Weeks):    Wound Number:    Is this injury device related?:    Incision Type:    Closure Method:    Wound Description (Comments):    Type:    Additional Comments:    Ankle-Brachial Index:    Pulses:    Removal Indication and Assessment:    Wound Outcome:    Wound Image      05/06/24 1012   Dressing Appearance Intact;Moist drainage 05/06/24 1012   Drainage Amount Moderate 05/06/24 1012   Drainage Characteristics/Odor Serosanguineous;No odor 05/06/24 1012   Appearance Red;Moist;[place] 05/06/24 1012   Red (%), Wound Tissue Color 100 % 05/06/24 1012   Periwound Area Redness;Moist;Edematous 05/06/24 1012   Care Cleansed with:;Antimicrobial agent;Sterile normal saline 05/06/24 1012   Dressing Applied;Calcium alginate;Absorptive Pad;Cast padding;Tubular bandage 05/06/24 1012   Compression Tubular elasticized bandage 05/06/24 1012   Dressing Change Due 05/20/24 05/06/24 1012           Plan:          Fluid culture growing proteus her PCP has already called in omnicef to take twice per day for next ten days, patient will pick uthis up today. Start silver product to skin for antimicorbial effect suprabsorbant layers to manage drainage will also request home health to change dressing in three days return in two weeks for wound check    Tissue pathology and/or culture taken     [] Yes      [x] No  Sharp debridement performed                   []  "Yes       [x] No  Labs ordered     [] Yes       [x] No  Imaging ordered    [] Yes      [x] No    Orders Placed This Encounter   Procedures    Ambulatory referral/consult to Wound Clinic     Standing Status:   Standing     Number of Occurrences:   1     Referral Priority:   Routine     Referral Type:   Consultation     Referral Reason:   Specialty Services Required     Requested Specialty:   Wound Care     Number of Visits Requested:   1    Change dressing     Wound Dressing Orders    Dressing change frequency three times a week (HH visiting on Mondays, Wednesdays, and Fridays) when not seen at [facility].  Remove old dressing  Cleanse or irrigate with: Normal Saline    Location: Left Anterior, Medial, Lateral, Posterior Lower Leg Blisters  Primary dressing: WOUND BASE: Calcium Alginate with AG (x4)  Secondary dressing: DrawtexSizes: size used: 8" x 8" (x3); then MextraSizes: size used: 9" x 13" (x2)  Compression: cast padding (x2); Tubi- size E double layer (ankle measurement 27.5cm)  Other: HH visiting on Mondays, Wednesdays, and Fridays     Return to clinic in 2 week.    SUBSEQUENT HOME HEALTH ORDERS     Home Health Orders visit patient every Mondays, Wednesdays, and Friday (when not seen at [facility] on Mondays). Patient was seen at [facility] on 05/06/2024. Next [facility] appointment 05/20/2024.    Wound Dressing Orders    Dressing change frequency three times a week   Remove old dressing  Cleanse or irrigate with: Normal Saline    Location: Left Anterior, Medial, Lateral, Posterior Lower Leg Blisters  Primary dressing: WOUND BASE: Calcium Alginate with AG (x4)  Secondary dressing: DrawtexSizes: size used: 8" x 8" (x3); then MextraSizes: size used: 9" x 13" (x2)  Compression: cast padding (x2); Tubi- size E double layer (ankle measurement 27.5cm)  Other: HH visiting on Mondays, Wednesdays, and Fridays     Evaluate for acute changes (purulence, increased redness/swelling, increased drainage, malodor, increased pain, pallor, " necrosis) please contact physician on any acute changes.     If after clinic hours or over the weekend, send patient to the ER. Call clinic at [phone] if any changes, substitutions or questions about orders.      DO NOT DEVIATE FROM ORDER. PLEASE ORDER SUPPLIES NEED TO APPLY TO PATIENT'S WOUNDS. Call [facility] at [phone] if there are any questions about dressing application or substitutions.     Order Specific Question:   What Home Health Agency is the patient currently using?     Answer:   Other/External        Follow up in about 2 weeks (around 5/20/2024) for wound care.

## 2024-05-06 NOTE — TELEPHONE ENCOUNTER
----- Message from Mile Young MD sent at 5/6/2024  8:59 AM CDT -----  Culture positive  I will send in omnicef

## 2024-05-08 ENCOUNTER — TELEPHONE (OUTPATIENT)
Dept: WOUND CARE | Facility: HOSPITAL | Age: 84
End: 2024-05-08
Payer: MEDICARE

## 2024-05-09 ENCOUNTER — HOSPITAL ENCOUNTER (OUTPATIENT)
Dept: WOUND CARE | Facility: HOSPITAL | Age: 84
Discharge: HOME | End: 2024-05-09
Payer: MEDICARE

## 2024-05-09 VITALS
DIASTOLIC BLOOD PRESSURE: 61 MMHG | RESPIRATION RATE: 14 BRPM | TEMPERATURE: 98 F | SYSTOLIC BLOOD PRESSURE: 113 MMHG | HEART RATE: 93 BPM

## 2024-05-09 DIAGNOSIS — L97.922 CHRONIC ULCER OF LEFT LOWER EXTREMITY WITH FAT LAYER EXPOSED: Primary | ICD-10-CM

## 2024-05-09 PROCEDURE — 99213 OFFICE O/P EST LOW 20 MIN: CPT

## 2024-05-09 NOTE — PROGRESS NOTES
[facility]  [address] [place]  [address]  Nurse Visit    Subjective:       Patient seen in clinic today.  Dressing changed as ordered.      Assessment:          Wound 05/06/24 0920 Blister(s) Left anterior;lateral;posterior;medial;lower Leg (Active)   05/06/24 0920   Present on Original Admission:    Primary Wound Type: Blister(s)   Side: Left   Orientation: anterior;lateral;posterior;medial;lower   Location: Leg   Wound Approximate Age at First Assessment (Weeks):    Wound Number:    Is this injury device related?:    Incision Type:    Closure Method:    Wound Description (Comments):    Type:    Additional Comments:    Ankle-Brachial Index:    Pulses:    Removal Indication and Assessment:    Wound Outcome:    Dressing Appearance Dry;Intact;Dried drainage 05/09/24 1135   Drainage Amount Large 05/09/24 1135   Drainage Characteristics/Odor Serosanguineous;Brown 05/09/24 1135   Appearance Pink;Yellow;Slough;Moist 05/09/24 1135   Tissue loss description Full thickness 05/09/24 1135   Red (%), Wound Tissue Color 80 % 05/09/24 1135   Yellow (%), Wound Tissue Color 20 % 05/09/24 1135   Periwound Area Intact;Moist;[place] 05/09/24 1135   Wound Edges Undefined 05/09/24 1135   Care Cleansed with:;Soap and water;Sterile normal saline 05/09/24 1135   Dressing Applied 05/09/24 1135   Periwound Care Dry periwound area maintained 05/09/24 1135   Compression Tubular elasticized bandage 05/09/24 1135   Dressing Change Due 05/16/24 05/09/24 1135           Plan:     No orders of the defined types were placed in this encounter.    Order continued per MD from 5/6/24  Wound Dressing Orders    Dressing change frequency three times a week (HH visiting on Mondays, Wednesdays, and Fridays) when not seen at [facility].  Remove old dressing  Cleanse or irrigate with: Normal Saline    Location: Left Anterior, Medial, Lateral, Posterior Lower Leg Blisters  Primary dressing: WOUND BASE: Calcium Alginate with AG  "(x4)  Secondary dressing: DrawtexSizes: size used: 8" x 8" (x3); then MextraSizes: size used: 9" x 13" (x2)  Compression: cast padding (x2); Tubi- size E double layer (ankle measurement 27.5cm)  Other: HH visiting on Mondays, Wednesdays, and Fridays    Return to clinic in 2 week.         Follow up if symptoms worsen or fail to improve.        "

## 2024-05-10 PROCEDURE — G0180 MD CERTIFICATION HHA PATIENT: HCPCS | Mod: ,,, | Performed by: INTERNAL MEDICINE

## 2024-05-13 DIAGNOSIS — K21.00 GASTROESOPHAGEAL REFLUX DISEASE WITH ESOPHAGITIS, UNSPECIFIED WHETHER HEMORRHAGE: ICD-10-CM

## 2024-05-13 DIAGNOSIS — E03.9 HYPOTHYROIDISM, UNSPECIFIED TYPE: ICD-10-CM

## 2024-05-13 DIAGNOSIS — J30.9 ALLERGIC RHINITIS, UNSPECIFIED SEASONALITY, UNSPECIFIED TRIGGER: ICD-10-CM

## 2024-05-13 DIAGNOSIS — R32 URINARY INCONTINENCE, UNSPECIFIED TYPE: ICD-10-CM

## 2024-05-13 DIAGNOSIS — I10 ESSENTIAL HYPERTENSION: Chronic | ICD-10-CM

## 2024-05-13 DIAGNOSIS — E79.0 HYPERURICEMIA: ICD-10-CM

## 2024-05-13 RX ORDER — OMEPRAZOLE 20 MG/1
CAPSULE, DELAYED RELEASE ORAL
Qty: 90 CAPSULE | Refills: 3 | Status: SHIPPED | OUTPATIENT
Start: 2024-05-13

## 2024-05-13 RX ORDER — OXYBUTYNIN CHLORIDE 10 MG/1
10 TABLET, EXTENDED RELEASE ORAL DAILY
Qty: 90 TABLET | Refills: 2 | Status: SHIPPED | OUTPATIENT
Start: 2024-05-13

## 2024-05-13 RX ORDER — ATORVASTATIN CALCIUM 40 MG/1
40 TABLET, FILM COATED ORAL DAILY
Qty: 90 TABLET | Refills: 3 | Status: SHIPPED | OUTPATIENT
Start: 2024-05-13

## 2024-05-13 RX ORDER — FLUTICASONE PROPIONATE 50 MCG
1 SPRAY, SUSPENSION (ML) NASAL DAILY
Qty: 48 G | Refills: 1 | Status: SHIPPED | OUTPATIENT
Start: 2024-05-13

## 2024-05-13 RX ORDER — METOPROLOL SUCCINATE 50 MG/1
50 TABLET, EXTENDED RELEASE ORAL DAILY
Qty: 90 TABLET | Refills: 3 | Status: SHIPPED | OUTPATIENT
Start: 2024-05-13

## 2024-05-13 RX ORDER — MONTELUKAST SODIUM 10 MG/1
10 TABLET ORAL NIGHTLY
Qty: 90 TABLET | Refills: 3 | Status: SHIPPED | OUTPATIENT
Start: 2024-05-13

## 2024-05-13 RX ORDER — LEVOTHYROXINE SODIUM 25 UG/1
TABLET ORAL
Qty: 90 TABLET | Refills: 2 | Status: SHIPPED | OUTPATIENT
Start: 2024-05-13

## 2024-05-13 RX ORDER — ALLOPURINOL 100 MG/1
100 TABLET ORAL DAILY
Qty: 90 TABLET | Refills: 3 | Status: SHIPPED | OUTPATIENT
Start: 2024-05-13

## 2024-05-13 RX ORDER — AMLODIPINE BESYLATE 10 MG/1
10 TABLET ORAL DAILY
Qty: 90 TABLET | Refills: 3 | Status: SHIPPED | OUTPATIENT
Start: 2024-05-13

## 2024-05-13 NOTE — TELEPHONE ENCOUNTER
No care due was identified.  Health [facility] Embedded Care Due Messages. Reference number: [id number].   5/13/2024 10:53:03 AM CDT

## 2024-05-13 NOTE — TELEPHONE ENCOUNTER
----- Message from Zaida Ignacio sent at 5/13/2024  8:10 AM CDT -----  Type: Patient Call Back    Who called: self     What is the request in detail: please call patient concerning the refill of her medications    Can the clinic reply by [facility]? No     Would the patient rather a call back or a response via [facility]?  call    Best call back number: .[phone] (home)      Additional Information:

## 2024-05-20 ENCOUNTER — HOSPITAL ENCOUNTER (OUTPATIENT)
Dept: WOUND CARE | Facility: HOSPITAL | Age: 84
Discharge: HOME | End: 2024-05-20
Attending: FAMILY MEDICINE
Payer: MEDICARE

## 2024-05-20 VITALS
SYSTOLIC BLOOD PRESSURE: 129 MMHG | HEART RATE: 114 BPM | TEMPERATURE: 97 F | RESPIRATION RATE: 18 BRPM | DIASTOLIC BLOOD PRESSURE: 76 MMHG

## 2024-05-20 DIAGNOSIS — L97.922 CHRONIC ULCER OF LEFT LOWER EXTREMITY WITH FAT LAYER EXPOSED: Primary | ICD-10-CM

## 2024-05-20 DIAGNOSIS — I89.0 LYMPHEDEMA: ICD-10-CM

## 2024-05-20 PROCEDURE — 99214 OFFICE O/P EST MOD 30 MIN: CPT | Mod: 25,,, | Performed by: INTERNAL MEDICINE

## 2024-05-20 PROCEDURE — 87205 SMEAR GRAM STAIN: CPT | Performed by: INTERNAL MEDICINE

## 2024-05-20 PROCEDURE — 87186 SC STD MICRODIL/AGAR DIL: CPT | Performed by: INTERNAL MEDICINE

## 2024-05-20 PROCEDURE — 11042 DBRDMT SUBQ TIS 1ST 20SQCM/<: CPT | Mod: ,,, | Performed by: INTERNAL MEDICINE

## 2024-05-20 PROCEDURE — 87070 CULTURE OTHR SPECIMN AEROBIC: CPT | Performed by: INTERNAL MEDICINE

## 2024-05-20 PROCEDURE — 87077 CULTURE AEROBIC IDENTIFY: CPT | Mod: 59 | Performed by: INTERNAL MEDICINE

## 2024-05-20 PROCEDURE — 99213 OFFICE O/P EST LOW 20 MIN: CPT | Performed by: INTERNAL MEDICINE

## 2024-05-20 NOTE — PROCEDURES
"Wound Debridement    Date/Time: 5/20/2024 8:31 AM    Performed by: Celestine Mcintyre MD  Authorized by: Celestine Mcintyre MD    Time out: Immediately prior to procedure a "time out" was called to verify the correct patient, procedure, equipment, support staff and site/side marked as required.    Consent Done?:  Yes (Verbal)  Local anesthesia used?: Yes    Local anesthetic:  Topical anesthetic    Wound Details:    Location:  Left leg (lateral aspect)    Type of Debridement:  Excisional       Length (cm):  0.5       Area (sq cm):  0.15       Width (cm):  0.3       Percent Debrided (%):  100       Depth (cm):  0.1       Total Area Debrided (sq cm):  0.15    Depth of debridement:  Subcutaneous tissue    Tissue debrided:  Subcutaneous    Devitalized tissue debrided:  Biofilm and Fibrin    Instruments:  Curette  Bleeding:  Minimal  Hemostasis Achieved: Yes  Method Used:  Pressure  Patient tolerance:  Patient tolerated the procedure well with no immediate complications  Specimen Collected: Specimen sent to microbiology    "

## 2024-05-20 NOTE — PROGRESS NOTES
[facility]                Progress Note    Subjective:       Patient ID: Ivelisse Kern is a 84 y.o. female.    Chief Complaint: Non-healing Wound Follow Up, Wound Care, and Dressing Change    Patient was seen today for follow up wound care visit. Patient brought to the exam room by wheelchair by LPN. Patient ambulated from the wheelchair, wheels lock, to the exam chair with assistance from the LPN. She denies pain or discomfort to the Left Lower Leg. Denies fever, chills, nausea, vomiting, or diarrhea at present. Dressing intact with strike through drainage through the cast padding and not through the Tubigrip. Culture was taken from the Left Lower Leg and sent to the lab per Dr. Mcintyre verbal orders. She reported HH came out as order. Patient stated she will not be able to make her 2 week appointment ( Monday, June 03, 2024) because of transportation issues. [address] was fax orders to visit patient on Monday, June 03, 2024. Wound care done per MD orders. Patient ambulated from the exam chair to the wheelchair, wheels lock, with assistance from the LPN. AVS printed and given to the patient.    Return to clinic in 3 week.    Completed antibiotics last week (ten day course) no pain to legs. Home health changing compression three times per week. On take down today there was saturation to suprabsorbant layer no fevers/chills she spends majority of day in chair is elevating legs when seated      Review of Systems      Objective:        Physical Exam  Constitutional:       General: She is not in acute distress.  Pulmonary:      Effort: Pulmonary effort is normal. No respiratory distress.   Musculoskeletal:      Right lower leg: No edema.      Left lower leg: Edema present.   Skin:     Comments: Left lateral leg with superficial fibrin no fluctuance less erythema compared to two weeks ago no expressible discharge no pain with palpation see procedure note for  debridement note   Neurological:      Mental Status: She is alert.         Vitals:    05/20/24 0948   BP: 129/76   Pulse: (!) 114   Resp: 18   Temp: 97.2 °F (36.2 °C)       Assessment:           ICD-10-CM ICD-9-CM   1. Chronic ulcer of left lower extremity with fat layer exposed  L97.922 707.10   2. Lymphedema  I89.0 457.1            Wound 05/06/24 0920 Blister(s) Left anterior;lateral;posterior;medial;lower Leg (Active)   05/06/24 0920   Present on Original Admission:    Primary Wound Type: Blister(s)   Side: Left   Orientation: anterior;lateral;posterior;medial;lower   Location: Leg   Wound Approximate Age at First Assessment (Weeks):    Wound Number:    Is this injury device related?:    Incision Type:    Closure Method:    Wound Description (Comments):    Type:    Additional Comments:    Ankle-Brachial Index:    Pulses:    Removal Indication and Assessment:    Wound Outcome:    Wound Image      05/20/24 0949   Dressing Appearance Intact;Moist drainage 05/20/24 0949   Drainage Amount Large 05/20/24 0949   Drainage Characteristics/Odor Serosanguineous;No odor 05/20/24 0949   Appearance Red 05/20/24 0949   Red (%), Wound Tissue Color 100 % 05/20/24 0949   Periwound Area Edematous;Redness 05/20/24 0949   Care Cleansed with:;Antimicrobial agent;Soap and water;Sterile normal saline 05/20/24 0949   Dressing Changed;Calcium alginate;Absorptive Pad;Cast padding;Tubular bandage 05/20/24 0949   Compression Tubular elasticized bandage 05/20/24 0949   Dressing Change Due 06/10/24 05/20/24 0949           Plan:          Her edema has improved with consistent compression stillw with some drainage noted on dressing repeat tissue cultture taken from lateral leg today to evaluate for persistent bacteria continue compression therapy with home health changing three times per week when not in clinic    Tissue pathology and/or culture taken     [x] Yes      [] No  Sharp debridement performed                   [] Yes       [x] No  Labs  "ordered     [] Yes       [x] No  Imaging ordered    [] Yes      [x] No    Orders Placed This Encounter   Procedures    CULTURE, TISSUE     Lateral aspect    Change dressing     Wound Dressing Orders    Dressing change frequency three times a week (HH visiting on Mondays, Wednesdays, and Fridays) when not seen at [facility].  Remove old dressing  Cleanse or irrigate with: Normal Saline    Location: Left Anterior, Medial, Lateral, Posterior Lower Leg Blisters  Primary dressing: WOUND BASE: Calcium Alginate with AG (x2) to the Left Posterior Lower Leg and Left Lateral Lower Leg  Secondary dressing: DrawtexSizes: size used: 8" x 8" (x1); then MextraSizes: size used: 9" x 13" (x1)  Compression: cast padding (x2); Tubi- size E double layer (ankle measurement 27.5cm)  Other: HH visiting on Mondays, Wednesdays, and Fridays    Return to clinic in 3 week.    SUBSEQUENT HOME HEALTH ORDERS     Please visit patient on Monday, June 03, 2024 due to transportation issues getting to [facility] appointment.      Home Health Orders visit patient every Mondays, Wednesdays, and Friday (when not seen at [facility] on Mondays). Patient was seen at [facility] on 05/20/2024. Next [facility] appointment 06/10/2024.    Wound Dressing Orders    Dressing change frequency three times a week  Remove old dressing  Cleanse or irrigate with: Normal Saline    Location: Left Anterior, Medial, Lateral, Posterior Lower Leg Blisters  Primary dressing: WOUND BASE: Calcium Alginate with AG (x2) to the Left Posterior Lower Leg and Left Lateral Lower Leg  Secondary dressing: Drawtex size used: 8" x 8" (x1); then MextraSizes: size used: 9" x 13" (x1)  Compression: cast padding (x2); Tubi- size E double layer (ankle measurement 27.5cm)  Other: HH visiting on Mondays, Wednesdays, and Fridays    Evaluate for acute changes (purulence, increased redness/swelling, increased drainage, malodor, increased pain, pallor, necrosis) please contact physician on any acute changes.    If after clinic " hours or over the weekend, send patient to the ER. Call clinic at [phone] if any changes, substitutions or questions about orders.    DO NOT DEVIATE FROM ORDER. PLEASE ORDER SUPPLIES NEED TO APPLY TO PATIENT'S WOUNDS. Call [facility] at [phone] if there are any questions about dressing application or substitutions.     Order Specific Question:   What Home Health Agency is the patient currently using?     Answer:   Other/External     Comments:   [facility]        Follow up in about 3 weeks (around 6/10/2024) for wound care.

## 2024-05-22 ENCOUNTER — TELEPHONE (OUTPATIENT)
Dept: FAMILY MEDICINE | Facility: CLINIC | Age: 84
End: 2024-05-22
Payer: MEDICARE

## 2024-05-22 DIAGNOSIS — L03.116 CELLULITIS OF LEFT LOWER EXTREMITY: Primary | ICD-10-CM

## 2024-05-22 RX ORDER — CIPROFLOXACIN 500 MG/1
500 TABLET ORAL 2 TIMES DAILY
Qty: 20 TABLET | Refills: 0 | Status: SHIPPED | OUTPATIENT
Start: 2024-05-22 | End: 2024-06-03 | Stop reason: ALTCHOICE

## 2024-05-22 NOTE — TELEPHONE ENCOUNTER
Culture showing bacteria both proteus and pseudomonas sensitive to fluoroquinolones, will treat with ciprofloxacin BID x ten days

## 2024-05-23 LAB
BACTERIA TISS AEROBE CULT: ABNORMAL
GRAM STN SPEC: ABNORMAL

## 2024-06-03 ENCOUNTER — TELEPHONE (OUTPATIENT)
Dept: WOUND CARE | Facility: HOSPITAL | Age: 84
End: 2024-06-03
Payer: MEDICARE

## 2024-06-03 ENCOUNTER — OFFICE VISIT (OUTPATIENT)
Dept: HOME HEALTH SERVICES | Facility: CLINIC | Age: 84
End: 2024-06-03
Payer: MEDICARE

## 2024-06-03 VITALS
DIASTOLIC BLOOD PRESSURE: 60 MMHG | BODY MASS INDEX: 29.45 KG/M2 | TEMPERATURE: 97 F | RESPIRATION RATE: 16 BRPM | OXYGEN SATURATION: 97 % | HEART RATE: 112 BPM | WEIGHT: 160.06 LBS | HEIGHT: 62 IN | SYSTOLIC BLOOD PRESSURE: 118 MMHG

## 2024-06-03 DIAGNOSIS — E78.5 HYPERLIPIDEMIA, UNSPECIFIED HYPERLIPIDEMIA TYPE: Chronic | ICD-10-CM

## 2024-06-03 DIAGNOSIS — K21.9 GASTROESOPHAGEAL REFLUX DISEASE WITHOUT ESOPHAGITIS: ICD-10-CM

## 2024-06-03 DIAGNOSIS — I50.32 CHRONIC DIASTOLIC CONGESTIVE HEART FAILURE: ICD-10-CM

## 2024-06-03 DIAGNOSIS — E03.9 HYPOTHYROIDISM, UNSPECIFIED TYPE: Chronic | ICD-10-CM

## 2024-06-03 DIAGNOSIS — Z00.00 ENCOUNTER FOR PREVENTIVE HEALTH EXAMINATION: Primary | ICD-10-CM

## 2024-06-03 DIAGNOSIS — E21.1 HYPERPARATHYROIDISM , SECONDARY, NON-RENAL: ICD-10-CM

## 2024-06-03 DIAGNOSIS — N39.46 URGE AND STRESS INCONTINENCE: ICD-10-CM

## 2024-06-03 DIAGNOSIS — R26.9 ABNORMALITY OF GAIT AND MOBILITY: ICD-10-CM

## 2024-06-03 DIAGNOSIS — L03.116 CELLULITIS OF LEFT LOWER EXTREMITY: ICD-10-CM

## 2024-06-03 DIAGNOSIS — Z99.89 DEPENDENCE ON OTHER ENABLING MACHINES AND DEVICES: ICD-10-CM

## 2024-06-03 DIAGNOSIS — L97.922 CHRONIC ULCER OF LEFT LOWER EXTREMITY WITH FAT LAYER EXPOSED: ICD-10-CM

## 2024-06-03 DIAGNOSIS — I10 ESSENTIAL HYPERTENSION: Chronic | ICD-10-CM

## 2024-06-03 DIAGNOSIS — M81.0 AGE-RELATED OSTEOPOROSIS WITHOUT CURRENT PATHOLOGICAL FRACTURE: ICD-10-CM

## 2024-06-03 DIAGNOSIS — E55.9 VITAMIN D DEFICIENCY DISEASE: ICD-10-CM

## 2024-06-03 DIAGNOSIS — J44.9 CHRONIC OBSTRUCTIVE PULMONARY DISEASE, UNSPECIFIED COPD TYPE: Chronic | ICD-10-CM

## 2024-06-03 PROCEDURE — 3074F SYST BP LT 130 MM HG: CPT | Mod: CPTII,S$GLB,,

## 2024-06-03 PROCEDURE — 3288F FALL RISK ASSESSMENT DOCD: CPT | Mod: CPTII,S$GLB,,

## 2024-06-03 PROCEDURE — 1101F PT FALLS ASSESS-DOCD LE1/YR: CPT | Mod: CPTII,S$GLB,,

## 2024-06-03 PROCEDURE — 1158F ADVNC CARE PLAN TLK DOCD: CPT | Mod: CPTII,S$GLB,,

## 2024-06-03 PROCEDURE — 1170F FXNL STATUS ASSESSED: CPT | Mod: CPTII,S$GLB,,

## 2024-06-03 PROCEDURE — G0439 PPPS, SUBSEQ VISIT: HCPCS | Mod: S$GLB,,,

## 2024-06-03 PROCEDURE — 1160F RVW MEDS BY RX/DR IN RCRD: CPT | Mod: CPTII,S$GLB,,

## 2024-06-03 PROCEDURE — 3078F DIAST BP <80 MM HG: CPT | Mod: CPTII,S$GLB,,

## 2024-06-03 PROCEDURE — 1159F MED LIST DOCD IN RCRD: CPT | Mod: CPTII,S$GLB,,

## 2024-06-03 PROCEDURE — 1126F AMNT PAIN NOTED NONE PRSNT: CPT | Mod: CPTII,S$GLB,,

## 2024-06-03 NOTE — PROGRESS NOTES
"Ivelisse Kern presented for an initial Medicare AWV today. The following components were reviewed and updated:    Medical history  Family History  Social history  Allergies and Current Medications  Health Risk Assessment  Health Maintenance  Care Team    **See Completed Assessments for Annual Wellness visit with in the encounter summary    The following assessments were completed:  Depression Screening  Cognitive function Screening    Timed Get Up Test: Deferred, impaired mobility  Whisper Test      Opioid documentation:      Patient does not have a current opioid prescription.          Vitals:    06/03/24 1000   BP: 118/60   BP Location: Left arm   Patient Position: Sitting   Pulse: (!) 112   Resp: 16   Temp: 97 °F (36.1 °C)   SpO2: 97%   Weight: 72.6 kg (160 lb 0.9 oz)   Height: 5' 2" (1.575 m)     Body mass index is 29.27 kg/m².       Physical Exam  Vitals reviewed.   Constitutional:       General: She is not in acute distress.     Appearance: Normal appearance.   HENT:      Head: Normocephalic.   Cardiovascular:      Rate and Rhythm: Normal rate and regular rhythm.      Pulses: Normal pulses.      Heart sounds: Normal heart sounds.   Pulmonary:      Effort: Pulmonary effort is normal. No respiratory distress.      Breath sounds: Normal breath sounds. No wheezing.   Abdominal:      General: Bowel sounds are normal.      Tenderness: There is no abdominal tenderness.   Musculoskeletal:         General: Normal range of motion.      Cervical back: Normal range of motion.      Right lower leg: No edema.      Left lower leg: No edema.   Skin:     General: Skin is warm and dry.      Capillary Refill: Capillary refill takes less than 2 seconds.   Neurological:      General: No focal deficit present.      Mental Status: She is alert and oriented to person, place, and time.      Gait: Gait abnormal.   Psychiatric:         Mood and Affect: Mood normal.         Behavior: Behavior normal.           Diagnoses and health risks " identified today and associated recommendations/orders:    1. Encounter for preventive health examination  Assessments completed.   recommendations reviewed.  F/u with PCP as instructed.      2. Chronic ulcer of left lower extremity with fat layer exposed  Chronic, stable on current regimen. Followed by Wound Care.     3. Chronic obstructive pulmonary disease, unspecified COPD type  Chronic, stable on current albuterol regimen. Followed by PCP.     4. Chronic diastolic congestive heart failure  Chronic, stable on current regimen. Followed by Cardiology.     5. Hyperparathyroidism , secondary, non-renal  Chronic, stable on current regimen. Followed by PCP.   Lab Results   Component Value Date    .7 (H) 04/25/2024    CALCIUM 9.5 04/25/2024    PHOS 4.6 (H) 08/31/2016     6. Essential hypertension  Chronic, stable on current Amlodipine, Metoprolol regimen. Followed by PCP.     7. Hyperlipidemia, unspecified hyperlipidemia type  Chronic, stable on current statin regimen. Followed by PCP.     8. Urge and stress incontinence  Chronic, stable on current Ditropan regimen. Followed by PCP.     9. Cellulitis of left lower extremity  Chronic, stable on current regimen. Followed by Wound Care.     10. Hypothyroidism, unspecified type  Chronic, stable on current Synthroid regimen. Followed by PCP.   Lab Results   Component Value Date    TSH 2.474 04/25/2024     11. Vitamin D deficiency disease  Chronic, stable on current Vit D regimen. Followed by PCP.     12. Age-related osteoporosis without current pathological fracture  Chronic, stable on current Vit D regimen. Followed by PCP.     13. Gastroesophageal reflux disease without esophagitis  Chronic, stable on current Prilosec regimen. Followed by PCP.     14. Abnormality of gait and mobility  Unable to safely ambulate without assistance.     15. Dependence on other enabling machines and devices  Uses standard walker.       Provided Ivelisse with a 5-10 year written screening  schedule and personal prevention plan. Recommendations were developed using the USPSTF age appropriate recommendations. Education, counseling, and referrals were provided as needed.  After Visit Summary printed and given to patient which includes a list of additional screenings\tests needed.    Follow up in about 1 year (around 6/3/2025) for Medicare AWV.      Jocelyne Ochoa NP    I offered to discuss advanced care planning, including how to pick a person who would make decisions for you if you were unable to make them for yourself, called a health care power of , and what kind of decisions you might make such as use of life sustaining treatments such as ventilators and tube feeding when faced with a life limiting illness recorded on a living will that they will need to know. (How you want to be cared for as you near the end of your natural life)     X Patient is interested in learning more about how to make advanced directives.  I provided them paperwork and offered to discuss this with them.

## 2024-06-03 NOTE — PATIENT INSTRUCTIONS
Counseling and Referral of Other Preventative  (Italic type indicates deductible and co-insurance are waived)    Patient Name: Ivelisse Kern  Today's Date: 6/3/2024    Health Maintenance       Date Due Completion Date    RSV Vaccine (Age 60+ and Pregnant patients) (1 - 1-dose 60+ series) Never done ---    Shingles Vaccine (3 of 3) 06/17/2022 4/22/2022    COVID-19 Vaccine (4 - 2023-24 season) 09/01/2023 5/20/2022    DEXA Scan 12/16/2023 12/16/2021    Override on 1/7/2011: Done (osteoporosis)    Lipid Panel 04/25/2025 4/25/2024        No orders of the defined types were placed in this encounter.    The following information is provided to all patients.  This information is to help you find resources for any of the problems found today that may be affecting your health:                  Living healthy guide: www.[facility].[place].gov      Understanding Diabetes: www.diabetes.org      Eating healthy: www.cdc.gov/healthyweight      CDC home safety checklist: www.cdc.gov/steadi/patient.html      Agency on Aging: www.goea.[place].[facility]      Alcoholics anonymous (AA): www.aa.org      Physical Activity: www.mela.nih.gov/el6oygj      Tobacco use: www.quitwithusla.org

## 2024-06-03 NOTE — TELEPHONE ENCOUNTER
Pt called in to cancel her Monday June 10th appt and reschedule for June 17th . Advised and changed appt

## 2024-06-24 ENCOUNTER — HOSPITAL ENCOUNTER (OUTPATIENT)
Dept: WOUND CARE | Facility: HOSPITAL | Age: 84
Discharge: HOME | End: 2024-06-24
Attending: FAMILY MEDICINE
Payer: MEDICARE

## 2024-06-24 VITALS
HEART RATE: 105 BPM | TEMPERATURE: 98 F | DIASTOLIC BLOOD PRESSURE: 61 MMHG | RESPIRATION RATE: 14 BRPM | SYSTOLIC BLOOD PRESSURE: 112 MMHG

## 2024-06-24 DIAGNOSIS — L97.922 CHRONIC ULCER OF LEFT LOWER EXTREMITY WITH FAT LAYER EXPOSED: Primary | ICD-10-CM

## 2024-06-24 PROCEDURE — 99214 OFFICE O/P EST MOD 30 MIN: CPT | Mod: ,,, | Performed by: INTERNAL MEDICINE

## 2024-06-24 PROCEDURE — 11042 DBRDMT SUBQ TIS 1ST 20SQCM/<: CPT

## 2024-06-24 NOTE — PROGRESS NOTES
[facility]                Progress Note    Subjective:       Patient ID: Ivelisse Kern is a 84 y.o. female.    Chief Complaint: Dressing Change, Wound Care, and Wound Check    Follow up wound care visit. Patient pushed in wheelchair by RN to exam room without issue. Patient denies fever, nausea/vomiting, and flu-like symptoms; denies pain or discomfort at present. Wound dressing to left lateral elbow intact with small amount of dried drainage noted and wound covered in fibrin. Drsg removed and wound cleansed by RN. Drsg applied per MD order. Pt to follow up in 2 weeks. AVS printed for pt.     Last seen one month ago home health ahs been changing compression (cast padding and tubi ) three times per week. Patient reporst legs draining less then when we first started seeing each other. No fevers/chills breathing at baseline. She spends majority of day in chair is elevating legs      Review of Systems      Objective:        Physical Exam  Constitutional:       General: She is not in acute distress.  Cardiovascular:      Pulses: Normal pulses.   Pulmonary:      Effort: Pulmonary effort is normal. No respiratory distress.   Musculoskeletal:      Right lower leg: No edema.      Left lower leg: Edema present.   Skin:     Comments: Dried alginate to lateral left leg removed non selectively with sterile qtip small pink based ulcers revealed without expressible discharge or surrounding indruation    Neurological:      Mental Status: She is alert.         Vitals:    06/24/24 1622   BP: 112/61   Pulse: 105   Resp: 14   Temp: 97.5 °F (36.4 °C)       Assessment:           ICD-10-CM ICD-9-CM   1. Chronic ulcer of left lower extremity with fat layer exposed  L97.922 707.10            Wound 05/06/24 0920 Blister(s) Left anterior;lateral;posterior;medial;lower Leg (Active)   05/06/24 0920   Present on Original Admission:    Primary Wound Type: Blister(s)   Side: Left   Orientation:  anterior;lateral;posterior;medial;lower   Location: Leg   Wound Approximate Age at First Assessment (Weeks):    Wound Number:    Is this injury device related?:    Incision Type:    Closure Method:    Wound Description (Comments):    Type:    Additional Comments:    Ankle-Brachial Index:    Pulses:    Removal Indication and Assessment:    Wound Outcome:    Wound Image      06/24/24 1627   Dressing Appearance Dry;Intact;Dried drainage 06/24/24 1627   Drainage Amount Scant 06/24/24 1627   Drainage Characteristics/Odor Serosanguineous;No odor 06/24/24 1627   Appearance Pink;Dry;Fibrin 06/24/24 1627   Tissue loss description Partial thickness 06/24/24 1627   Red (%), Wound Tissue Color 100 % 06/24/24 1627   Wound Edges Irregular 06/24/24 1627   Care Cleansed with:;Soap and water 06/24/24 1627   Dressing Applied;Calcium alginate;Silver;Non-adherent;Absorptive Pad;Cast padding;Tubular bandage 06/24/24 1627   Periwound Care Dry periwound area maintained 06/24/24 1627   Compression Tubular elasticized bandage 06/24/24 1627   Dressing Change Due 07/08/24 06/24/24 1627           Plan:          Drainage and edema has improved wounds are smaller with less surrounding drainge continue compression decrease dressing change frequency to  twice weekly return for wound check in three weeks    Tissue pathology and/or culture taken     [] Yes      [x] No  Sharp debridement performed                   [] Yes       [x] No  Labs ordered     [] Yes       [x] No  Imaging ordered    [] Yes      [x] No    Orders Placed This Encounter   Procedures    Change dressing     Wound Dressing Orders    Dressing change frequency twice times a week (HH visiting on Mondays and Thursdays) when not seen at [facility].  Remove old dressing  Cleanse or irrigate with: Normal Saline    Location: Left Anterior, Medial, Lateral, Posterior Lower Leg Blisters  Primary dressing: WOUND BASE: Calcium Alginate with AG (x2) to the Left Posterior Lower Leg and Left Lateral  "Lower Leg  Secondary dressing: DrawtexSizes: size used: 8" x 8" (x1); then MextraSizes: size used: 9" x 13" (x1)  Compression: cast padding (x2); Tubi- size E double layer (ankle measurement 27.5cm)  Other: HH visiting on Mondays, and days    Return to clinic in 2 week.    SUBSEQUENT HOME HEALTH ORDERS     Home Health Orders visit patient every Monday and Thursday (when not seen at [facility] on Mondays). Patient was seen at [facility] on 06/24/2024. Next [facility] appointment 07/08/2024.    Wound Dressing Orders    Dressing change frequency twice a week  Remove old dressing  Cleanse or irrigate with: Normal Saline    Location: Left Anterior, Medial, Lateral, Posterior Lower Leg Blisters  Primary dressing: WOUND BASE: Calcium Alginate with AG (x2) to the Left Posterior Lower Leg and Left Lateral Lower Leg  Secondary dressing: Drawtex size used: 8" x 8" (x1); then MextraSizes: size used: 9" x 13" (x1)  Compression: cast padding (x2); Tubi- size E double layer (ankle measurement 27.5cm)  Other: HH visiting on Mondays, Wednesdays, and Fridays    Evaluate for acute changes (purulence, increased redness/swelling, increased drainage, malodor, increased pain, pallor, necrosis) please contact physician on any acute changes.    If after clinic hours or over the weekend, send patient to the ER. Call clinic at [phone] if any changes, substitutions or questions about orders.    DO NOT DEVIATE FROM ORDER. PLEASE ORDER SUPPLIES NEED TO APPLY TO PATIENT'S WOUNDS. Call [facility] at [phone] if there are any questions about dressing application or substitutions.     Order Specific Question:   What Home Health Agency is the patient currently using?     Answer:   Other/External     Comments:   [facility]        Follow up in about 2 weeks (around 7/8/2024) for Wound Care.       "

## 2024-06-25 ENCOUNTER — TELEPHONE (OUTPATIENT)
Dept: FAMILY MEDICINE | Facility: CLINIC | Age: 84
End: 2024-06-25
Payer: MEDICARE

## 2024-06-25 NOTE — TELEPHONE ENCOUNTER
----- Message from Rajani Puga MA sent at 6/25/2024  9:16 AM CDT -----  Type: Patient Call Back    Who called: [facility]    What is the request in detail: needs more information sent regarding the orders.. last face to face, last clinic notes, history & physical and list of medication.    Can the clinic reply by [facility]?NO    Would the patient rather a call back or a response via [facility]? Yes, call     Best call back number: [phone] (home)

## 2024-06-28 ENCOUNTER — EXTERNAL HOME HEALTH (OUTPATIENT)
Dept: HOME HEALTH SERVICES | Facility: HOSPITAL | Age: 84
End: 2024-06-28
Payer: MEDICARE

## 2024-07-09 PROCEDURE — G0179 MD RECERTIFICATION HHA PT: HCPCS | Mod: ,,, | Performed by: INTERNAL MEDICINE

## 2024-07-15 ENCOUNTER — HOSPITAL ENCOUNTER (OUTPATIENT)
Dept: WOUND CARE | Facility: HOSPITAL | Age: 84
Discharge: HOME | End: 2024-07-15
Attending: FAMILY MEDICINE
Payer: MEDICARE

## 2024-07-15 VITALS
SYSTOLIC BLOOD PRESSURE: 159 MMHG | TEMPERATURE: 97 F | RESPIRATION RATE: 18 BRPM | HEART RATE: 97 BPM | DIASTOLIC BLOOD PRESSURE: 71 MMHG

## 2024-07-15 DIAGNOSIS — L97.922 CHRONIC ULCER OF LEFT LOWER EXTREMITY WITH FAT LAYER EXPOSED: Primary | ICD-10-CM

## 2024-07-15 PROCEDURE — 99213 OFFICE O/P EST LOW 20 MIN: CPT | Mod: ,,, | Performed by: INTERNAL MEDICINE

## 2024-07-15 PROCEDURE — 99213 OFFICE O/P EST LOW 20 MIN: CPT | Performed by: INTERNAL MEDICINE

## 2024-07-15 NOTE — PROGRESS NOTES
[facility]                Progress Note    Subjective:       Patient ID: Ivelisse Kern is a 84 y.o. female.    Chief Complaint: Wound Care and Dressing Change    Patient was seen today for follow up wound care visit. Patient in wheelchair brought to the exam room by LPN. Patient ambulated from the wheelchair, wheels lock, with LPN standing by to assist.  She denies pain or discomfort to the wound bed. Denies fever, nausea, chills, vomiting, or diarrhea at present. Dressing intact without strike through drainage. She reported HH came out as ordered. Wound dressing was updated and applied by LPN per MD orders. MD gave patient discharged instructions, patient is discharged from wound care and Home Health services will not be needed at this time. Patient ambulated from the exam chair to the wheelchair, wheels lock, with LPN standing by to assist.    No drainage no fevers no periwound pain      Review of Systems      Objective:        Physical Exam  Constitutional:       General: She is not in acute distress.  Pulmonary:      Effort: Pulmonary effort is normal. No respiratory distress.   Musculoskeletal:      Right lower leg: No edema.      Left lower leg: No edema.   Skin:     Comments: Dried prodcut removed from posterior calf to show intact skin no fluctuance or induration   Neurological:      Mental Status: She is alert.         Vitals:    07/15/24 1234   BP: (!) 159/71   Pulse: 97   Resp: 18   Temp: 97.4 °F (36.3 °C)       Assessment:           ICD-10-CM ICD-9-CM   1. Chronic ulcer of left lower extremity with fat layer exposed  L97.922 707.10            Wound 05/06/24 0920 Blister(s) Left anterior;lateral;posterior;medial;lower Leg (Active)   05/06/24 0920   Present on Original Admission:    Primary Wound Type: Blister(s)   Side: Left   Orientation: anterior;lateral;posterior;medial;lower   Location: Leg   Wound Approximate Age at First Assessment (Weeks):    Wound  Number:    Is this injury device related?:    Incision Type:    Closure Method:    Wound Description (Comments):    Type:    Additional Comments:    Ankle-Brachial Index:    Pulses:    Removal Indication and Assessment:    Wound Outcome:    Wound Image      07/15/24 1239   Dressing Appearance Intact;Dry 07/15/24 1239   Drainage Amount None 07/15/24 1239   Appearance Pink;Red;Dry 07/15/24 1239   Red (%), Wound Tissue Color 100 % 07/15/24 1239   Periwound Area Dry;Intact 07/15/24 1239   Wound Edges Rolled/closed 07/15/24 1239   Care Cleansed with:;Antimicrobial agent;Sterile normal saline 07/15/24 1239   Dressing Applied;Tubular bandage 07/15/24 1239           Plan:          Wounds healed encourage use of compression stockings daily discharge from wound clinic return as needed for any new skin breakdown    Tissue pathology and/or culture taken     [] Yes      [x] No  Sharp debridement performed                   [] Yes       [x] No  Labs ordered     [] Yes       [x] No  Imaging ordered    [] Yes      [x] No    Orders Placed This Encounter   Procedures    Change dressing     Wound Dressing Orders    Location: Left Anterior, Medial, Lateral, Posterior Lower Leg Blisters  LLE Compression: Tubi- size F double layer ( calf measurement 43 cm)   Other: Patient is discharged from wound care.    SUBSEQUENT HOME HEALTH ORDERS     Home Health Orders     Patient is discharged from wound care. Home Health services will not be needed at this time.     Order Specific Question:   What Home Health Agency is the patient currently using?     Answer:   Other/External     Comments:   [facility]        Follow up if symptoms worsen or fail to improve.

## 2024-08-19 ENCOUNTER — EXTERNAL HOME HEALTH (OUTPATIENT)
Dept: HOME HEALTH SERVICES | Facility: HOSPITAL | Age: 84
End: 2024-08-19
Payer: MEDICARE

## 2024-10-29 ENCOUNTER — LAB VISIT (OUTPATIENT)
Dept: LAB | Facility: HOSPITAL | Age: 84
End: 2024-10-29
Attending: FAMILY MEDICINE
Payer: MEDICARE

## 2024-10-29 DIAGNOSIS — I10 ESSENTIAL HYPERTENSION: ICD-10-CM

## 2024-10-29 LAB
ALBUMIN SERPL BCP-MCNC: 3.6 G/DL (ref 3.5–5.2)
ALP SERPL-CCNC: 93 U/L (ref 40–150)
ALT SERPL W/O P-5'-P-CCNC: <5 U/L (ref 10–44)
ANION GAP SERPL CALC-SCNC: 10 MMOL/L (ref 8–16)
AST SERPL-CCNC: 8 U/L (ref 10–40)
BASOPHILS # BLD AUTO: 0.08 K/UL (ref 0–0.2)
BASOPHILS NFR BLD: 1.2 % (ref 0–1.9)
BILIRUB SERPL-MCNC: 0.3 MG/DL (ref 0.1–1)
BUN SERPL-MCNC: 20 MG/DL (ref 8–23)
CALCIUM SERPL-MCNC: 9.4 MG/DL (ref 8.7–10.5)
CHLORIDE SERPL-SCNC: 107 MMOL/L (ref 95–110)
CO2 SERPL-SCNC: 22 MMOL/L (ref 23–29)
CREAT SERPL-MCNC: 0.8 MG/DL (ref 0.5–1.4)
DIFFERENTIAL METHOD BLD: ABNORMAL
EOSINOPHIL # BLD AUTO: 0.2 K/UL (ref 0–0.5)
EOSINOPHIL NFR BLD: 3.5 % (ref 0–8)
ERYTHROCYTE [DISTWIDTH] IN BLOOD BY AUTOMATED COUNT: 15.7 % (ref 11.5–14.5)
EST. GFR  (NO RACE VARIABLE): >60 ML/MIN/1.73 M^2
GLUCOSE SERPL-MCNC: 99 MG/DL (ref 70–110)
HCT VFR BLD AUTO: 29.5 % (ref 37–48.5)
HGB BLD-MCNC: 9.2 G/DL (ref 12–16)
IMM GRANULOCYTES # BLD AUTO: 0.03 K/UL (ref 0–0.04)
IMM GRANULOCYTES NFR BLD AUTO: 0.4 % (ref 0–0.5)
LYMPHOCYTES # BLD AUTO: 1.5 K/UL (ref 1–4.8)
LYMPHOCYTES NFR BLD: 21.5 % (ref 18–48)
MCH RBC QN AUTO: 26.5 PG (ref 27–31)
MCHC RBC AUTO-ENTMCNC: 31.2 G/DL (ref 32–36)
MCV RBC AUTO: 85 FL (ref 82–98)
MONOCYTES # BLD AUTO: 0.6 K/UL (ref 0.3–1)
MONOCYTES NFR BLD: 8.2 % (ref 4–15)
NEUTROPHILS # BLD AUTO: 4.5 K/UL (ref 1.8–7.7)
NEUTROPHILS NFR BLD: 65.2 % (ref 38–73)
NRBC BLD-RTO: 0 /100 WBC
PLATELET # BLD AUTO: 462 K/UL (ref 150–450)
PMV BLD AUTO: 9.3 FL (ref 9.2–12.9)
POTASSIUM SERPL-SCNC: 3.8 MMOL/L (ref 3.5–5.1)
PROT SERPL-MCNC: 7.3 G/DL (ref 6–8.4)
RBC # BLD AUTO: 3.47 M/UL (ref 4–5.4)
SODIUM SERPL-SCNC: 139 MMOL/L (ref 136–145)
WBC # BLD AUTO: 6.93 K/UL (ref 3.9–12.7)

## 2024-10-29 PROCEDURE — 85025 COMPLETE CBC W/AUTO DIFF WBC: CPT | Performed by: FAMILY MEDICINE

## 2024-10-29 PROCEDURE — 80053 COMPREHEN METABOLIC PANEL: CPT | Performed by: FAMILY MEDICINE

## 2024-10-29 PROCEDURE — 36415 COLL VENOUS BLD VENIPUNCTURE: CPT | Mod: PO | Performed by: FAMILY MEDICINE

## 2024-11-04 ENCOUNTER — OFFICE VISIT (OUTPATIENT)
Dept: FAMILY MEDICINE | Facility: CLINIC | Age: 84
End: 2024-11-04
Payer: MEDICARE

## 2024-11-04 VITALS
TEMPERATURE: 98 F | SYSTOLIC BLOOD PRESSURE: 148 MMHG | HEIGHT: 62 IN | DIASTOLIC BLOOD PRESSURE: 68 MMHG | OXYGEN SATURATION: 93 % | WEIGHT: 160.06 LBS | HEART RATE: 78 BPM | BODY MASS INDEX: 29.45 KG/M2

## 2024-11-04 DIAGNOSIS — J30.9 ALLERGIC RHINITIS, UNSPECIFIED SEASONALITY, UNSPECIFIED TRIGGER: ICD-10-CM

## 2024-11-04 DIAGNOSIS — E46 MALNUTRITION, UNSPECIFIED TYPE: ICD-10-CM

## 2024-11-04 DIAGNOSIS — Z85.3 HISTORY OF BREAST CANCER: ICD-10-CM

## 2024-11-04 DIAGNOSIS — E21.3 HYPERPARATHYROIDISM: ICD-10-CM

## 2024-11-04 DIAGNOSIS — E78.5 HYPERLIPIDEMIA, UNSPECIFIED HYPERLIPIDEMIA TYPE: Chronic | ICD-10-CM

## 2024-11-04 DIAGNOSIS — I10 ESSENTIAL HYPERTENSION: ICD-10-CM

## 2024-11-04 DIAGNOSIS — Z23 FLU VACCINE NEED: Primary | ICD-10-CM

## 2024-11-04 DIAGNOSIS — J44.9 CHRONIC OBSTRUCTIVE PULMONARY DISEASE, UNSPECIFIED COPD TYPE: Chronic | ICD-10-CM

## 2024-11-04 DIAGNOSIS — D64.9 ANEMIA, UNSPECIFIED TYPE: ICD-10-CM

## 2024-11-04 PROCEDURE — 3078F DIAST BP <80 MM HG: CPT | Mod: CPTII,S$GLB,, | Performed by: FAMILY MEDICINE

## 2024-11-04 PROCEDURE — 90653 IIV ADJUVANT VACCINE IM: CPT | Mod: S$GLB,,, | Performed by: FAMILY MEDICINE

## 2024-11-04 PROCEDURE — 1159F MED LIST DOCD IN RCRD: CPT | Mod: CPTII,S$GLB,, | Performed by: FAMILY MEDICINE

## 2024-11-04 PROCEDURE — 1126F AMNT PAIN NOTED NONE PRSNT: CPT | Mod: CPTII,S$GLB,, | Performed by: FAMILY MEDICINE

## 2024-11-04 PROCEDURE — 3288F FALL RISK ASSESSMENT DOCD: CPT | Mod: CPTII,S$GLB,, | Performed by: FAMILY MEDICINE

## 2024-11-04 PROCEDURE — G0008 ADMIN INFLUENZA VIRUS VAC: HCPCS | Mod: S$GLB,,, | Performed by: FAMILY MEDICINE

## 2024-11-04 PROCEDURE — 99999 PR PBB SHADOW E&M-EST. PATIENT-LVL IV: CPT | Mod: PBBFAC,,, | Performed by: FAMILY MEDICINE

## 2024-11-04 PROCEDURE — 3077F SYST BP >= 140 MM HG: CPT | Mod: CPTII,S$GLB,, | Performed by: FAMILY MEDICINE

## 2024-11-04 PROCEDURE — 1160F RVW MEDS BY RX/DR IN RCRD: CPT | Mod: CPTII,S$GLB,, | Performed by: FAMILY MEDICINE

## 2024-11-04 PROCEDURE — 99397 PER PM REEVAL EST PAT 65+ YR: CPT | Mod: 25,S$GLB,, | Performed by: FAMILY MEDICINE

## 2024-11-04 PROCEDURE — 1101F PT FALLS ASSESS-DOCD LE1/YR: CPT | Mod: CPTII,S$GLB,, | Performed by: FAMILY MEDICINE

## 2024-11-04 NOTE — PROGRESS NOTES
Routine Office Visit     Patient Name: Ivelisse Kern    : 1940  MRN: [id number]    Subjective     History of Present Illness    CHIEF COMPLAINT:  Patient presents today for annual physical   Arthritis   Hypertension   Lymphedema   Chronic back pain     MOBILITY AND SOCIAL SUPPORT:  She uses a walker at home and a wheelchair for outside transportation, which she finds easier for getting to and from the car. She lives alone but receives daily assistance from her sister. Her nephew provides transportation for medical appointments and grocery shopping every 3-4 weeks. She reports becoming closer to her nephew in the past 4-5 years, who has been consistently available when needed.    ARTHRITIS AND PAIN MANAGEMENT:  She reports experiencing arthritis pain and has been taking Tylenol Arthritis for management.    LEG WOUND:  She has a leg wound that is no longer draining. A friend assists with wound care, using saline solution to clean the area during visits. She previously received home care services for wound management, including dressing changes, but these visits were discontinued due to insurance limitations and physician clearance.    ANEMIA:  She has a history of stable anemia with hemoglobin levels consistently around 9-9.2. Her diet includes regular chicken consumption, prepared daily by her sister. She denies consuming spinach or other leafy greens.    DRY MOUTH:  She experiences dry mouth and uses mints and Biotene to manage the symptoms.      ROS:  General: no fever, no chills, no fatigue, no weight gain, no weight loss  Eyes: no vision changes, no redness, no discharge  ENT: no ear pain, no nasal congestion, no sore throat, +dry mouth  Cardiovascular: no chest pain, no palpitations, no lower extremity edema  Respiratory: no cough, no shortness of breath  Gastrointestinal: no abdominal pain, no nausea, no vomiting, no diarrhea, no constipation, no blood in stool  Genitourinary: no dysuria, no hematuria, no  "frequency  Musculoskeletal: +joint pain, no muscle pain  Skin: no rash, no lesion  Neurological: no headache, no dizziness, no numbness, no tingling  Psychiatric: no anxiety, no depression, no sleep difficulty           Objective     BP (!) 148/68   Pulse 78   Temp 97.9 °F (36.6 °C) (Oral)   Ht 5' 2" (1.575 m)   Wt 72.6 kg (160 lb 0.9 oz)   SpO2 (!) 93%   BMI 29.27 kg/m²   Physical Exam  Constitutional:       Appearance: She is well-developed.   HENT:      Head: Normocephalic and atraumatic.   Eyes:      Conjunctiva/sclera: Conjunctivae normal.      Pupils: Pupils are equal, round, and reactive to light.   Cardiovascular:      Rate and Rhythm: Normal rate and regular rhythm.      Heart sounds: Normal heart sounds. No murmur heard.     No friction rub. No gallop.   Pulmonary:      Effort: No respiratory distress.      Breath sounds: Normal breath sounds.   Abdominal:      General: Bowel sounds are normal. There is no distension.      Palpations: Abdomen is soft.      Tenderness: There is no abdominal tenderness.   Musculoskeletal:         General: Normal range of motion.      Cervical back: Normal range of motion and neck supple.   Lymphadenopathy:      Cervical: No cervical adenopathy.   Skin:     General: Skin is warm.   Neurological:      Mental Status: She is alert and oriented to person, place, and time.           Assessment     Assessment & Plan     Reviewed labs results, noting stable anemia and slightly elevated platelets   Assessed patient's mobility and daily activities   Evaluated current medications and their potential side effects, such as dry mouth   Considered patient's support system and transportation needs    DRY MOUTH:   Discussed potential causes of dry mouth, including medication side effects.   Continued use of Biotene products (toothpaste, mouthwash, and spray) for dry mouth management.   Recommend increasing water intake to 4 ounces daily.    ARTHRITIS:   Continued Tylenol Arthritis for " pain management.    OTHER INSTRUCTIONS:   Patient to continue current level of physical activity, including walking and performing daily tasks around the house.    FOLLOW UP:   Follow up in 6 months for comprehensive follow-up including cholesterol and thyroid tests.         Problem List Items Addressed This Visit          ENT    AR (allergic rhinitis)       Pulmonary    COPD (chronic obstructive pulmonary disease) (Chronic)       Cardiac/Vascular    Essential hypertension (Chronic)    Relevant Orders    CBC Auto Differential    Comprehensive Metabolic Panel    Lipid Panel    TSH    Hyperlipidemia (Chronic)  The current medical regimen is effective;  continue present plan and medications.          Oncology    History of breast cancer  Noted in chart         Endocrine    Hyperparathyroidism  Stable   Continue to monitor      Other Visit Diagnoses       Annual physical exam  I addressed all major concerns as it related to health maintenance.  All were ordered and scheduled based on the patients wishes.  Any additional health maintenance will be readdressed at the next physical if declined or deferred by the patient.     Flu vaccine need    -  Primary    Relevant Medications    influenza (adjuvanted) (Fluad) 45 mcg/0.5 mL IM vaccine (> or = 64 yo) 0.5 mL (Completed)    Anemia, unspecified type        Relevant Orders    IRON AND TIBC    Ferritin   Emphasized the importance of proper nutrition for managing anemia, recommending foods rich in iron such as spinach, broccoli, chicken, and turkey.   Patient to maintain current diet with emphasis on iron-rich foods.   Repeat labs ordered for 6 months from now.      Malnutrition, unspecified type        Relevant Orders    PREALBUMIN              This note was generated with the assistance of ambient listening technology. Verbal consent was obtained by the patient and accompanying visitor(s) for the recording of patient appointment to facilitate this note. I attest to having  reviewed and edited the generated note for accuracy, though some syntax or spelling errors may persist. Please contact the author of this note for any clarification.

## 2025-01-30 DIAGNOSIS — Z78.0 MENOPAUSE: ICD-10-CM

## 2025-02-23 DIAGNOSIS — E03.9 HYPOTHYROIDISM, UNSPECIFIED TYPE: ICD-10-CM

## 2025-02-23 DIAGNOSIS — R32 URINARY INCONTINENCE, UNSPECIFIED TYPE: ICD-10-CM

## 2025-02-23 RX ORDER — OXYBUTYNIN CHLORIDE 10 MG/1
10 TABLET, EXTENDED RELEASE ORAL
Qty: 90 TABLET | Refills: 2 | Status: SHIPPED | OUTPATIENT
Start: 2025-02-23

## 2025-02-23 RX ORDER — LEVOTHYROXINE SODIUM 25 UG/1
25 TABLET ORAL EVERY MORNING
Qty: 90 TABLET | Refills: 0 | Status: SHIPPED | OUTPATIENT
Start: 2025-02-23

## 2025-02-23 NOTE — TELEPHONE ENCOUNTER
Refill Decision Note   Ivelisse Kern  is requesting a refill authorization.  Brief Assessment and Rationale for Refill:  Approve     Medication Therapy Plan: flos      Comments:     Note composed:2:30 PM 02/23/2025

## 2025-02-23 NOTE — TELEPHONE ENCOUNTER
Care Due:                  Date            Visit Type   Department     Provider  --------------------------------------------------------------------------------                                [facility]                              PRIMARY      MED/ INTERNAL  Last Visit: 11-      CARE ([facility])   MED/ PEDS      Mile Young                              [facility]                              PRIMARY      MED/ INTERNAL  Next Visit: 05-      CARE ([facility])   MED/ PEDS      Mile Young                                                            Last  Test          Frequency    Reason                     Performed    Due Date  --------------------------------------------------------------------------------    Lipid Panel.  12 months..  atorvastatin.............  04- 04-    Uric Acid...  12 months..  allopurinoL..............  04- 04-    [employer] Embedded Care Due Messages. Reference number: [id number].   2/23/2025 4:36:59 AM CST

## 2025-04-28 ENCOUNTER — LAB VISIT (OUTPATIENT)
Dept: LAB | Facility: HOSPITAL | Age: 85
End: 2025-04-28
Attending: FAMILY MEDICINE
Payer: MEDICARE

## 2025-04-28 DIAGNOSIS — I10 ESSENTIAL HYPERTENSION: ICD-10-CM

## 2025-04-28 DIAGNOSIS — D64.9 ANEMIA, UNSPECIFIED TYPE: ICD-10-CM

## 2025-04-28 DIAGNOSIS — E46 MALNUTRITION, UNSPECIFIED TYPE: ICD-10-CM

## 2025-04-28 LAB
ABSOLUTE EOSINOPHIL (OHS): 0.31 K/UL
ABSOLUTE MONOCYTE (OHS): 0.57 K/UL (ref 0.3–1)
ABSOLUTE NEUTROPHIL COUNT (OHS): 4.63 K/UL (ref 1.8–7.7)
ALBUMIN SERPL BCP-MCNC: 3.7 G/DL (ref 3.5–5.2)
ALP SERPL-CCNC: 81 UNIT/L (ref 40–150)
ALT SERPL W/O P-5'-P-CCNC: 7 UNIT/L (ref 10–44)
ANION GAP (OHS): 10 MMOL/L (ref 8–16)
AST SERPL-CCNC: 11 UNIT/L (ref 11–45)
BASOPHILS # BLD AUTO: 0.07 K/UL
BASOPHILS NFR BLD AUTO: 1 %
BILIRUB SERPL-MCNC: 0.4 MG/DL (ref 0.1–1)
BUN SERPL-MCNC: 24 MG/DL (ref 8–23)
CALCIUM SERPL-MCNC: 9 MG/DL (ref 8.7–10.5)
CHLORIDE SERPL-SCNC: 111 MMOL/L (ref 95–110)
CHOLEST SERPL-MCNC: 139 MG/DL (ref 120–199)
CHOLEST/HDLC SERPL: 3.9 {RATIO} (ref 2–5)
CO2 SERPL-SCNC: 22 MMOL/L (ref 23–29)
CREAT SERPL-MCNC: 0.7 MG/DL (ref 0.5–1.4)
ERYTHROCYTE [DISTWIDTH] IN BLOOD BY AUTOMATED COUNT: 16.4 % (ref 11.5–14.5)
FERRITIN SERPL-MCNC: 17 NG/ML (ref 20–300)
GFR SERPLBLD CREATININE-BSD FMLA CKD-EPI: >60 ML/MIN/1.73/M2
GLUCOSE SERPL-MCNC: 98 MG/DL (ref 70–110)
HCT VFR BLD AUTO: 30.4 % (ref 37–48.5)
HDLC SERPL-MCNC: 36 MG/DL (ref 40–75)
HDLC SERPL: 25.9 % (ref 20–50)
HGB BLD-MCNC: 9 GM/DL (ref 12–16)
IMM GRANULOCYTES # BLD AUTO: 0.03 K/UL (ref 0–0.04)
IMM GRANULOCYTES NFR BLD AUTO: 0.4 % (ref 0–0.5)
IRON SATN MFR SERPL: 7 % (ref 20–50)
IRON SERPL-MCNC: 29 UG/DL (ref 30–160)
LDLC SERPL CALC-MCNC: 79.4 MG/DL (ref 63–159)
LYMPHOCYTES # BLD AUTO: 1.72 K/UL (ref 1–4.8)
MCH RBC QN AUTO: 25.6 PG (ref 27–31)
MCHC RBC AUTO-ENTMCNC: 29.6 G/DL (ref 32–36)
MCV RBC AUTO: 87 FL (ref 82–98)
NONHDLC SERPL-MCNC: 103 MG/DL
NUCLEATED RBC (/100WBC) (OHS): 0 /100 WBC
PLATELET # BLD AUTO: 435 K/UL (ref 150–450)
PMV BLD AUTO: 9.3 FL (ref 9.2–12.9)
POTASSIUM SERPL-SCNC: 3 MMOL/L (ref 3.5–5.1)
PREALB SERPL-MCNC: 12 MG/DL (ref 20–43)
PROT SERPL-MCNC: 7.3 GM/DL (ref 6–8.4)
RBC # BLD AUTO: 3.51 M/UL (ref 4–5.4)
RELATIVE EOSINOPHIL (OHS): 4.2 %
RELATIVE LYMPHOCYTE (OHS): 23.5 % (ref 18–48)
RELATIVE MONOCYTE (OHS): 7.8 % (ref 4–15)
RELATIVE NEUTROPHIL (OHS): 63.1 % (ref 38–73)
SODIUM SERPL-SCNC: 143 MMOL/L (ref 136–145)
TIBC SERPL-MCNC: 438 UG/DL (ref 250–450)
TRANSFERRIN SERPL-MCNC: 296 MG/DL (ref 200–375)
TRIGL SERPL-MCNC: 118 MG/DL (ref 30–150)
TSH SERPL-ACNC: 1.57 UIU/ML (ref 0.4–4)
WBC # BLD AUTO: 7.33 K/UL (ref 3.9–12.7)

## 2025-04-28 PROCEDURE — 84134 ASSAY OF PREALBUMIN: CPT

## 2025-04-28 PROCEDURE — 84443 ASSAY THYROID STIM HORMONE: CPT

## 2025-04-28 PROCEDURE — 85025 COMPLETE CBC W/AUTO DIFF WBC: CPT

## 2025-04-28 PROCEDURE — 36415 COLL VENOUS BLD VENIPUNCTURE: CPT | Mod: PO

## 2025-04-28 PROCEDURE — 84466 ASSAY OF TRANSFERRIN: CPT

## 2025-04-28 PROCEDURE — 80053 COMPREHEN METABOLIC PANEL: CPT

## 2025-04-28 PROCEDURE — 82728 ASSAY OF FERRITIN: CPT

## 2025-04-28 PROCEDURE — 80061 LIPID PANEL: CPT

## 2025-05-05 ENCOUNTER — OFFICE VISIT (OUTPATIENT)
Dept: FAMILY MEDICINE | Facility: CLINIC | Age: 85
End: 2025-05-05
Payer: MEDICARE

## 2025-05-05 VITALS
HEART RATE: 83 BPM | DIASTOLIC BLOOD PRESSURE: 58 MMHG | SYSTOLIC BLOOD PRESSURE: 100 MMHG | OXYGEN SATURATION: 96 % | HEIGHT: 62 IN | BODY MASS INDEX: 28.44 KG/M2 | WEIGHT: 154.56 LBS

## 2025-05-05 DIAGNOSIS — E78.5 HYPERLIPIDEMIA, UNSPECIFIED HYPERLIPIDEMIA TYPE: Chronic | ICD-10-CM

## 2025-05-05 DIAGNOSIS — J44.9 CHRONIC OBSTRUCTIVE PULMONARY DISEASE, UNSPECIFIED COPD TYPE: Chronic | ICD-10-CM

## 2025-05-05 DIAGNOSIS — I10 ESSENTIAL HYPERTENSION: Primary | Chronic | ICD-10-CM

## 2025-05-05 DIAGNOSIS — Z85.3 HISTORY OF BREAST CANCER: ICD-10-CM

## 2025-05-05 DIAGNOSIS — R53.81 DEBILITY: ICD-10-CM

## 2025-05-05 DIAGNOSIS — E21.3 HYPERPARATHYROIDISM: ICD-10-CM

## 2025-05-05 DIAGNOSIS — E46 MALNUTRITION, UNSPECIFIED TYPE: ICD-10-CM

## 2025-05-05 DIAGNOSIS — E87.6 HYPOKALEMIA: ICD-10-CM

## 2025-05-05 DIAGNOSIS — I89.0 LYMPHEDEMA: ICD-10-CM

## 2025-05-05 DIAGNOSIS — E03.9 HYPOTHYROIDISM, UNSPECIFIED TYPE: Chronic | ICD-10-CM

## 2025-05-05 DIAGNOSIS — I50.32 CHRONIC DIASTOLIC CONGESTIVE HEART FAILURE: ICD-10-CM

## 2025-05-05 PROCEDURE — 3288F FALL RISK ASSESSMENT DOCD: CPT | Mod: CPTII,S$GLB,, | Performed by: FAMILY MEDICINE

## 2025-05-05 PROCEDURE — 1159F MED LIST DOCD IN RCRD: CPT | Mod: CPTII,S$GLB,, | Performed by: FAMILY MEDICINE

## 2025-05-05 PROCEDURE — 99999 PR PBB SHADOW E&M-EST. PATIENT-LVL IV: CPT | Mod: PBBFAC,,, | Performed by: FAMILY MEDICINE

## 2025-05-05 PROCEDURE — G2211 COMPLEX E/M VISIT ADD ON: HCPCS | Mod: S$GLB,,, | Performed by: FAMILY MEDICINE

## 2025-05-05 PROCEDURE — 1160F RVW MEDS BY RX/DR IN RCRD: CPT | Mod: CPTII,S$GLB,, | Performed by: FAMILY MEDICINE

## 2025-05-05 PROCEDURE — 1101F PT FALLS ASSESS-DOCD LE1/YR: CPT | Mod: CPTII,S$GLB,, | Performed by: FAMILY MEDICINE

## 2025-05-05 PROCEDURE — 3078F DIAST BP <80 MM HG: CPT | Mod: CPTII,S$GLB,, | Performed by: FAMILY MEDICINE

## 2025-05-05 PROCEDURE — 3074F SYST BP LT 130 MM HG: CPT | Mod: CPTII,S$GLB,, | Performed by: FAMILY MEDICINE

## 2025-05-05 PROCEDURE — 99214 OFFICE O/P EST MOD 30 MIN: CPT | Mod: S$GLB,,, | Performed by: FAMILY MEDICINE

## 2025-05-06 ENCOUNTER — RESULTS FOLLOW-UP (OUTPATIENT)
Dept: FAMILY MEDICINE | Facility: CLINIC | Age: 85
End: 2025-05-06

## 2025-05-07 PROBLEM — L97.922 CHRONIC ULCER OF LEFT LOWER EXTREMITY WITH FAT LAYER EXPOSED: Status: RESOLVED | Noted: 2024-05-09 | Resolved: 2025-05-07

## 2025-05-25 DIAGNOSIS — K21.00 GASTROESOPHAGEAL REFLUX DISEASE WITH ESOPHAGITIS, UNSPECIFIED WHETHER HEMORRHAGE: ICD-10-CM

## 2025-05-25 DIAGNOSIS — E79.0 HYPERURICEMIA: ICD-10-CM

## 2025-05-25 DIAGNOSIS — I10 ESSENTIAL HYPERTENSION: Chronic | ICD-10-CM

## 2025-05-25 DIAGNOSIS — E03.9 HYPOTHYROIDISM, UNSPECIFIED TYPE: ICD-10-CM

## 2025-05-25 DIAGNOSIS — J30.9 ALLERGIC RHINITIS, UNSPECIFIED SEASONALITY, UNSPECIFIED TRIGGER: ICD-10-CM

## 2025-05-25 NOTE — TELEPHONE ENCOUNTER
Care Due:                  Date            Visit Type   Department     Provider  --------------------------------------------------------------------------------                                EP -         [facility] FAMILY [facility]                              PRIMARY      / INTERNAL MED  Last Visit: 05-      CARE ([facility])   / RYAN Young                              [facility] FAMILY MED                              PRIMARY      / INTERNAL MED  Next Visit: 11-      CARE ([facility])   / RYAN Young                                                            Last  Test          Frequency    Reason                     Performed    Due Date  --------------------------------------------------------------------------------    Uric Acid...  12 months..  allopurinoL..............  04- 04-    [facility] Embedded Care Due Messages. Reference number: [id number].   5/25/2025 4:14:42 AM CDT

## 2025-05-26 DIAGNOSIS — Z00.00 ENCOUNTER FOR MEDICARE ANNUAL WELLNESS EXAM: ICD-10-CM

## 2025-05-26 RX ORDER — AMLODIPINE BESYLATE 10 MG/1
10 TABLET ORAL
Qty: 90 TABLET | Refills: 3 | Status: SHIPPED | OUTPATIENT
Start: 2025-05-26

## 2025-05-26 RX ORDER — OMEPRAZOLE 20 MG/1
20 CAPSULE, DELAYED RELEASE ORAL
Qty: 90 CAPSULE | Refills: 3 | Status: SHIPPED | OUTPATIENT
Start: 2025-05-26

## 2025-05-26 RX ORDER — MONTELUKAST SODIUM 10 MG/1
10 TABLET ORAL NIGHTLY
Qty: 90 TABLET | Refills: 3 | Status: SHIPPED | OUTPATIENT
Start: 2025-05-26

## 2025-05-26 RX ORDER — ATORVASTATIN CALCIUM 40 MG/1
40 TABLET, FILM COATED ORAL
Qty: 90 TABLET | Refills: 3 | Status: SHIPPED | OUTPATIENT
Start: 2025-05-26

## 2025-05-26 RX ORDER — LEVOTHYROXINE SODIUM 25 UG/1
25 TABLET ORAL EVERY MORNING
Qty: 90 TABLET | Refills: 3 | Status: SHIPPED | OUTPATIENT
Start: 2025-05-26

## 2025-05-26 RX ORDER — ALLOPURINOL 100 MG/1
100 TABLET ORAL
Qty: 90 TABLET | Refills: 3 | Status: SHIPPED | OUTPATIENT
Start: 2025-05-26

## 2025-05-26 RX ORDER — METOPROLOL SUCCINATE 50 MG/1
50 TABLET, EXTENDED RELEASE ORAL
Qty: 90 TABLET | Refills: 3 | Status: SHIPPED | OUTPATIENT
Start: 2025-05-26

## 2025-05-26 NOTE — TELEPHONE ENCOUNTER
Refill Routing Note   Medication(s) are not appropriate for processing by [facility] for the following reason(s):        Required labs outdated    [facility] action(s):  Defer  Approve   Requires labs : Yes             Appointments  past 12m or future 3m with PCP    Date Provider   Last Visit   5/5/2025 Mile Young MD   Next Visit   11/10/2025 Mile Young MD   ED visits in past 90 days: 0        Note composed:1:31 AM 05/26/2025

## 2025-08-20 RX ORDER — FLUTICASONE PROPIONATE 50 MCG
SPRAY, SUSPENSION (ML) NASAL
Qty: 32 G | Refills: 1 | Status: SHIPPED | OUTPATIENT
Start: 2025-08-20